# Patient Record
Sex: MALE | Race: BLACK OR AFRICAN AMERICAN | Employment: OTHER | URBAN - METROPOLITAN AREA
[De-identification: names, ages, dates, MRNs, and addresses within clinical notes are randomized per-mention and may not be internally consistent; named-entity substitution may affect disease eponyms.]

---

## 2017-07-03 ENCOUNTER — HOSPITAL ENCOUNTER (OUTPATIENT)
Dept: NUCLEAR MEDICINE | Age: 67
Discharge: HOME | End: 2017-07-03
Attending: INTERNAL MEDICINE
Payer: MEDICARE

## 2017-07-03 DIAGNOSIS — R11.0 NAUSEA: ICD-10-CM

## 2017-07-03 DIAGNOSIS — R63.0 LOSS OF APPETITE: ICD-10-CM

## 2017-07-03 DIAGNOSIS — Z87.19 HISTORY OF GALLSTONES: ICD-10-CM

## 2017-07-03 DIAGNOSIS — R11.10 VOMITING: ICD-10-CM

## 2017-07-03 DIAGNOSIS — R19.7 DIARRHEA: ICD-10-CM

## 2017-07-03 PROCEDURE — 78227 HEPATOBIL SYST IMAGE W/DRUG: CPT

## 2018-02-25 ENCOUNTER — APPOINTMENT (OUTPATIENT)
Dept: CT IMAGING | Age: 68
End: 2018-02-25
Attending: PHYSICIAN ASSISTANT
Payer: MEDICARE

## 2018-02-25 ENCOUNTER — HOSPITAL ENCOUNTER (EMERGENCY)
Age: 68
Discharge: HOME | End: 2018-02-25
Attending: EMERGENCY MEDICINE
Payer: MEDICARE

## 2018-02-25 VITALS
HEART RATE: 70 BPM | DIASTOLIC BLOOD PRESSURE: 82 MMHG | TEMPERATURE: 98.4 F | BODY MASS INDEX: 25.48 KG/M2 | SYSTOLIC BLOOD PRESSURE: 134 MMHG | RESPIRATION RATE: 12 BRPM | OXYGEN SATURATION: 98 % | WEIGHT: 178 LBS | HEIGHT: 70 IN

## 2018-02-25 DIAGNOSIS — N17.9 ACUTE RENAL FAILURE, UNSPECIFIED ACUTE RENAL FAILURE TYPE (HCC): ICD-10-CM

## 2018-02-25 DIAGNOSIS — K52.9 GASTROENTERITIS, ACUTE: Primary | ICD-10-CM

## 2018-02-25 LAB
ALBUMIN SERPL-MCNC: 3 G/DL (ref 3.5–5)
ALBUMIN/GLOB SERPL: 0.9 {RATIO} (ref 1.1–2.2)
ALP SERPL-CCNC: 189 U/L (ref 45–117)
ALT SERPL-CCNC: 11 U/L (ref 12–78)
ANION GAP SERPL CALC-SCNC: 12 MMOL/L (ref 5–15)
AST SERPL-CCNC: 16 U/L (ref 15–37)
BASOPHILS # BLD: 0 K/UL (ref 0–0.1)
BASOPHILS NFR BLD: 0 % (ref 0–1)
BILIRUB SERPL-MCNC: 0.3 MG/DL (ref 0.2–1)
BUN SERPL-MCNC: 47 MG/DL (ref 6–20)
BUN/CREAT SERPL: 10 (ref 12–20)
CALCIUM SERPL-MCNC: 8.3 MG/DL (ref 8.5–10.1)
CHLORIDE SERPL-SCNC: 109 MMOL/L (ref 97–108)
CO2 SERPL-SCNC: 21 MMOL/L (ref 21–32)
CREAT SERPL-MCNC: 4.62 MG/DL (ref 0.7–1.3)
DIFFERENTIAL METHOD BLD: ABNORMAL
EOSINOPHIL # BLD: 0.2 K/UL (ref 0–0.4)
EOSINOPHIL NFR BLD: 3 % (ref 0–7)
ERYTHROCYTE [DISTWIDTH] IN BLOOD BY AUTOMATED COUNT: 14.6 % (ref 11.5–14.5)
GLOBULIN SER CALC-MCNC: 3.4 G/DL (ref 2–4)
GLUCOSE SERPL-MCNC: 105 MG/DL (ref 65–100)
HCT VFR BLD AUTO: 33.8 % (ref 36.6–50.3)
HGB BLD-MCNC: 10.7 G/DL (ref 12.1–17)
IMM GRANULOCYTES # BLD: 0 K/UL (ref 0–0.04)
IMM GRANULOCYTES NFR BLD AUTO: 0 % (ref 0–0.5)
LIPASE SERPL-CCNC: 189 U/L (ref 73–393)
LYMPHOCYTES # BLD: 1 K/UL (ref 0.8–3.5)
LYMPHOCYTES NFR BLD: 22 % (ref 12–49)
MCH RBC QN AUTO: 30.5 PG (ref 26–34)
MCHC RBC AUTO-ENTMCNC: 31.7 G/DL (ref 30–36.5)
MCV RBC AUTO: 96.3 FL (ref 80–99)
MONOCYTES # BLD: 0.6 K/UL (ref 0–1)
MONOCYTES NFR BLD: 12 % (ref 5–13)
NEUTS SEG # BLD: 2.8 K/UL (ref 1.8–8)
NEUTS SEG NFR BLD: 62 % (ref 32–75)
NRBC # BLD: 0 K/UL (ref 0–0.01)
NRBC BLD-RTO: 0 PER 100 WBC
PLATELET # BLD AUTO: 159 K/UL (ref 150–400)
PMV BLD AUTO: 10.8 FL (ref 8.9–12.9)
POTASSIUM SERPL-SCNC: 4.7 MMOL/L (ref 3.5–5.1)
PROT SERPL-MCNC: 6.4 G/DL (ref 6.4–8.2)
RBC # BLD AUTO: 3.51 M/UL (ref 4.1–5.7)
SODIUM SERPL-SCNC: 142 MMOL/L (ref 136–145)
WBC # BLD AUTO: 4.5 K/UL (ref 4.1–11.1)

## 2018-02-25 PROCEDURE — 80053 COMPREHEN METABOLIC PANEL: CPT | Performed by: PHYSICIAN ASSISTANT

## 2018-02-25 PROCEDURE — 85025 COMPLETE CBC W/AUTO DIFF WBC: CPT | Performed by: PHYSICIAN ASSISTANT

## 2018-02-25 PROCEDURE — 74011250636 HC RX REV CODE- 250/636: Performed by: PHYSICIAN ASSISTANT

## 2018-02-25 PROCEDURE — 96374 THER/PROPH/DIAG INJ IV PUSH: CPT

## 2018-02-25 PROCEDURE — 74176 CT ABD & PELVIS W/O CONTRAST: CPT

## 2018-02-25 PROCEDURE — C9113 INJ PANTOPRAZOLE SODIUM, VIA: HCPCS | Performed by: PHYSICIAN ASSISTANT

## 2018-02-25 PROCEDURE — 96375 TX/PRO/DX INJ NEW DRUG ADDON: CPT

## 2018-02-25 PROCEDURE — 96361 HYDRATE IV INFUSION ADD-ON: CPT

## 2018-02-25 PROCEDURE — 99284 EMERGENCY DEPT VISIT MOD MDM: CPT

## 2018-02-25 PROCEDURE — 83690 ASSAY OF LIPASE: CPT | Performed by: PHYSICIAN ASSISTANT

## 2018-02-25 PROCEDURE — 36415 COLL VENOUS BLD VENIPUNCTURE: CPT | Performed by: PHYSICIAN ASSISTANT

## 2018-02-25 PROCEDURE — 74011250637 HC RX REV CODE- 250/637: Performed by: PHYSICIAN ASSISTANT

## 2018-02-25 RX ORDER — MEGESTROL ACETATE 40 MG/1
40 TABLET ORAL 2 TIMES DAILY
COMMUNITY
End: 2022-03-24

## 2018-02-25 RX ORDER — PREDNISONE 2.5 MG/1
2.5 TABLET ORAL DAILY
COMMUNITY
End: 2019-04-30

## 2018-02-25 RX ORDER — TIMOLOL MALEATE 5 MG/ML
1 SOLUTION/ DROPS OPHTHALMIC DAILY
COMMUNITY

## 2018-02-25 RX ORDER — ONDANSETRON 2 MG/ML
4 INJECTION INTRAMUSCULAR; INTRAVENOUS
Status: COMPLETED | OUTPATIENT
Start: 2018-02-25 | End: 2018-02-25

## 2018-02-25 RX ORDER — CALCITRIOL 0.25 UG/1
0.25 CAPSULE ORAL DAILY
COMMUNITY
End: 2019-04-30

## 2018-02-25 RX ORDER — PANTOPRAZOLE SODIUM 40 MG/10ML
40 INJECTION, POWDER, LYOPHILIZED, FOR SOLUTION INTRAVENOUS
Status: COMPLETED | OUTPATIENT
Start: 2018-02-25 | End: 2018-02-25

## 2018-02-25 RX ORDER — TESTOSTERONE CYPIONATE 200 MG/ML
200 INJECTION INTRAMUSCULAR
Status: ON HOLD | COMMUNITY
End: 2021-05-11

## 2018-02-25 RX ORDER — ONDANSETRON 4 MG/1
4 TABLET, ORALLY DISINTEGRATING ORAL
Qty: 12 TAB | Refills: 0 | Status: SHIPPED | OUTPATIENT
Start: 2018-02-25 | End: 2019-04-30

## 2018-02-25 RX ORDER — DICYCLOMINE HYDROCHLORIDE 10 MG/1
10 CAPSULE ORAL
Status: COMPLETED | OUTPATIENT
Start: 2018-02-25 | End: 2018-02-25

## 2018-02-25 RX ORDER — ASPIRIN 81 MG/1
81 TABLET ORAL DAILY
Status: ON HOLD | COMMUNITY
End: 2020-03-14

## 2018-02-25 RX ORDER — ALLOPURINOL 100 MG/1
100 TABLET ORAL DAILY
Status: ON HOLD | COMMUNITY
End: 2021-05-11

## 2018-02-25 RX ORDER — DOXAZOSIN 2 MG/1
2 TABLET ORAL
COMMUNITY
End: 2019-04-30

## 2018-02-25 RX ORDER — DICYCLOMINE HYDROCHLORIDE 20 MG/1
20 TABLET ORAL EVERY 6 HOURS
Qty: 20 TAB | Refills: 0 | Status: SHIPPED | OUTPATIENT
Start: 2018-02-25 | End: 2018-03-02

## 2018-02-25 RX ADMIN — SODIUM CHLORIDE 500 ML: 900 INJECTION, SOLUTION INTRAVENOUS at 17:46

## 2018-02-25 RX ADMIN — ONDANSETRON 4 MG: 2 INJECTION INTRAMUSCULAR; INTRAVENOUS at 17:47

## 2018-02-25 RX ADMIN — SODIUM CHLORIDE 500 ML: 900 INJECTION, SOLUTION INTRAVENOUS at 19:05

## 2018-02-25 RX ADMIN — PANTOPRAZOLE SODIUM 40 MG: 40 INJECTION, POWDER, FOR SOLUTION INTRAVENOUS at 17:47

## 2018-02-25 RX ADMIN — DICYCLOMINE HYDROCHLORIDE 10 MG: 10 CAPSULE ORAL at 19:53

## 2018-02-25 RX ADMIN — ONDANSETRON 4 MG: 2 INJECTION INTRAMUSCULAR; INTRAVENOUS at 18:39

## 2018-02-25 NOTE — ED PROVIDER NOTES
HPI Comments: Sharita Owens is a 74 y.o. male  who presents by private vehicle to ER with c/o Patient presents with:  Abdominal Pain. Patient with nausea, vomiting and diarrhea with epigastric abdominal pain since 2pm today. Patient with history of kidney transplant in 2004. Denies any fever or chills. He specifically denies any fevers, chills, nausea, vomiting, chest pain, shortness of breath, headache, rash, diarrhea, abdominal pain, urinary/bowel changes, sweating or weight loss. PCP: Jessica Parekh MD   PMHx significant for: Past Medical History:  No date: Hypertension  No date: Ill-defined condition      Comment: dialysis access to left arm  No date: Meningitis   PSHx significant for: Past Surgical History:  No date: HX CATARACT REMOVAL  1971,2005: TRANSPLANTATION OF KIDNEY Bilateral      Comment: x2  Social Hx: Tobacco use: Smoking status: Current Every Day Smoker                                                   Packs/day: 0.50      Years: 0.00         Smokeless status: Not on file                     ; EtOH use: The patient states he drinks socially per week.; Illicit Drug use: Allergies:  No Known Allergies    There are no other complaints, changes or physical findings at this time. Patient is a 74 y.o. male presenting with abdominal pain. The history is provided by the patient. Abdominal Pain    This is a new problem. The current episode started 3 to 5 hours ago. The problem occurs constantly. The problem has not changed since onset. The pain is associated with vomiting. The pain is located in the epigastric region. The pain is at a severity of 9/10. The pain is severe. Associated symptoms include diarrhea, nausea and vomiting. Pertinent negatives include no anorexia, no fever, no dysuria, no myalgias, no chest pain and no back pain. Nothing worsens the pain. The pain is relieved by nothing. His past medical history does not include gallstones, GERD, ectopic pregnancy or DM.  The patient's surgical history non-contributory. Past Medical History:   Diagnosis Date    Hypertension     Ill-defined condition     dialysis access to left arm    Meningitis        Past Surgical History:   Procedure Laterality Date    HX CATARACT REMOVAL      TRANSPLANTATION OF KIDNEY Bilateral 1971,2005    x2         No family history on file. Social History     Social History    Marital status:      Spouse name: N/A    Number of children: N/A    Years of education: N/A     Occupational History    Not on file. Social History Main Topics    Smoking status: Current Every Day Smoker     Packs/day: 0.50    Smokeless tobacco: Not on file    Alcohol use Yes      Comment: socially    Drug use: Not on file    Sexual activity: Not on file     Other Topics Concern    Not on file     Social History Narrative         ALLERGIES: Review of patient's allergies indicates no known allergies. Review of Systems   Constitutional: Negative. Negative for fever. HENT: Negative. Eyes: Negative. Respiratory: Negative. Cardiovascular: Negative. Negative for chest pain. Gastrointestinal: Positive for abdominal pain, diarrhea, nausea and vomiting. Negative for anorexia. Endocrine: Negative. Genitourinary: Negative. Negative for dysuria. Musculoskeletal: Negative. Negative for back pain and myalgias. Skin: Negative. Allergic/Immunologic: Negative. Neurological: Negative. Hematological: Negative. Psychiatric/Behavioral: Negative. All other systems reviewed and are negative. Vitals:    02/25/18 1709 02/25/18 1718 02/25/18 1810   BP:  140/85    Pulse: 70     Resp: 12     Temp: 98.4 °F (36.9 °C)     SpO2: 99%  99%   Weight: 80.7 kg (178 lb)     Height: 5' 10\" (1.778 m)              Physical Exam   Constitutional: He is oriented to person, place, and time. He appears well-developed and well-nourished. HENT:   Head: Normocephalic and atraumatic.    Right Ear: External ear normal.   Left Ear: External ear normal.   Mouth/Throat: Oropharynx is clear and moist. No oropharyngeal exudate. Eyes: Conjunctivae and EOM are normal. Pupils are equal, round, and reactive to light. Right eye exhibits no discharge. Left eye exhibits no discharge. No scleral icterus. Neck: Normal range of motion. Neck supple. No tracheal deviation present. No thyromegaly present. Cardiovascular: Normal rate, regular rhythm, normal heart sounds and intact distal pulses. No murmur heard. Pulmonary/Chest: Effort normal and breath sounds normal. No respiratory distress. He has no wheezes. He has no rales. Abdominal: Soft. Bowel sounds are normal. He exhibits no distension. There is tenderness in the epigastric area. There is no rebound and no guarding. Musculoskeletal: Normal range of motion. He exhibits no edema or tenderness. Lymphadenopathy:     He has no cervical adenopathy. Neurological: He is alert and oriented to person, place, and time. No cranial nerve deficit. Coordination normal.   Skin: Skin is warm. No rash noted. No erythema. Psychiatric: He has a normal mood and affect. His behavior is normal. Judgment and thought content normal.   Nursing note and vitals reviewed. MDM  Number of Diagnoses or Management Options  Acute renal failure, unspecified acute renal failure type ([facility] [address]):   Gastroenteritis, acute:   Diagnosis management comments: Assesment/Plan- 74 y.o. Patient presents with:  Abdominal Pain  differential includes: gastroenteritis, small bowel obstruction, colitis. Labs and imaging reviewed with elevated creatinine, ct scan with findings consistent with enteritis. Patient with history of kidney transplant, patient believes last creatinine was 3.6. Discussed with Dr. Kai Cordon Nephrology who was unable to look up previous labs. Patient given IV  Bolus in ED, feeling better. Discharge home. Recommend Nephrology follow up. Patient educated on reasons to return to the ED. Amount and/or Complexity of Data Reviewed  Clinical lab tests: ordered and reviewed  Tests in the radiology section of CPT®: ordered and reviewed  Tests in the medicine section of CPT®: ordered and reviewed  Discuss the patient with other providers: yes (Attending- Dr. Tian Cooley who also saw patient and agrees with plan)          ED Course       Procedures

## 2018-02-26 NOTE — ED NOTES
Pt discharged by provider. Pt ambulatory off the unit with a steady gait with family and in NAD. Pt declined using a w/c. Home with spouse.

## 2018-02-26 NOTE — DISCHARGE INSTRUCTIONS
Gastroenteritis: Care Instructions  Your Care Instructions    Gastroenteritis is an illness that may cause nausea, vomiting, and diarrhea. It is sometimes called \"stomach flu. \" It can be caused by bacteria or a virus. You will probably begin to feel better in 1 to 2 days. In the meantime, get plenty of rest and make sure you do not become dehydrated. Dehydration occurs when your body loses too much fluid. Follow-up care is a key part of your treatment and safety. Be sure to make and go to all appointments, and call your doctor if you are having problems. It's also a good idea to know your test results and keep a list of the medicines you take. How can you care for yourself at home? · If your doctor prescribed antibiotics, take them as directed. Do not stop taking them just because you feel better. You need to take the full course of antibiotics. · Drink plenty of fluids to prevent dehydration, enough so that your urine is light yellow or clear like water. Choose water and other caffeine-free clear liquids until you feel better. If you have kidney, heart, or liver disease and have to limit fluids, talk with your doctor before you increase your fluid intake. · Drink fluids slowly, in frequent, small amounts, because drinking too much too fast can cause vomiting. · Begin eating mild foods, such as dry toast, yogurt, applesauce, bananas, and rice. Avoid spicy, hot, or high-fat foods, and do not drink alcohol or caffeine for a day or two. Do not drink milk or eat ice cream until you are feeling better. How to prevent gastroenteritis  · Keep hot foods hot and cold foods cold. · Do not eat meats, dressings, salads, or other foods that have been kept at room temperature for more than 2 hours. · Use a thermometer to check your refrigerator. It should be between 34°F and 40°F.  · Defrost meats in the refrigerator or microwave, not on the kitchen counter. · Keep your hands and your kitchen clean.  Wash your hands, cutting boards, and countertops with hot soapy water frequently. · Cook meat until it is well done. · Do not eat raw eggs or uncooked sauces made with raw eggs. · Do not take chances. If food looks or tastes spoiled, throw it out. When should you call for help? Call 911 anytime you think you may need emergency care. For example, call if:  ? · You vomit blood or what looks like coffee grounds. ? · You passed out (lost consciousness). ? · You pass maroon or very bloody stools. ?Call your doctor now or seek immediate medical care if:  ? · You have severe belly pain. ? · You have signs of needing more fluids. You have sunken eyes, a dry mouth, and pass only a little dark urine. ? · You feel like you are going to faint. ? · You have increased belly pain that does not go away in 1 to 2 days. ? · You have new or increased nausea, or you are vomiting. ? · You have a new or higher fever. ? · Your stools are black and tarlike or have streaks of blood. ? Watch closely for changes in your health, and be sure to contact your doctor if:  ? · You are dizzy or lightheaded. ? · You urinate less than usual, or your urine is dark yellow or brown. ? · You do not feel better with each day that goes by. Where can you learn more? Go to http://gail-lalo.info/. Enter N142 in the search box to learn more about \"Gastroenteritis: Care Instructions. \"  Current as of: March 3, 2017  Content Version: 11.4  © 8358-7291 [employer]. Care instructions adapted under license by [employer] (which disclaims liability or warranty for this information). If you have questions about a medical condition or this instruction, always ask your healthcare professional. [address] any warranty or liability for your use of this information. We hope that we have addressed all of your medical concerns.  The examination and treatment you received in the Emergency Department were for an emergent problem and were not intended as complete care. It is important that you follow up with your healthcare provider(s) for ongoing care. If your symptoms worsen or do not improve as expected, and you are unable to reach your usual health care provider(s), you should return to the Emergency Department. Today's healthcare is undergoing tremendous change, and patient satisfaction surveys are one of the many tools to assess the quality of medical care. You may receive a survey from the [employer] regarding your experience in the Emergency Department. I hope that your experience has been completely positive, particularly the medical care that I provided. As such, please participate in the survey; anything less than excellent does not meet my expectations or intentions. [facility] and [employer] participate in nationally recognized quality of care measures. If your blood pressure is greater than 120/80, as reported below, we urge that you seek medical care to address the potential of high blood pressure, commonly known as hypertension. Hypertension can be hereditary or can be caused by certain medical conditions, pain, stress, or \"white coat syndrome. \"       Please make an appointment with your health care provider(s) for follow up of your Emergency Department visit. VITALS:   Patient Vitals for the past 8 hrs:   Temp Pulse Resp BP SpO2   02/25/18 1810 - - - - 99 %   02/25/18 1718 - - - 140/85 -   02/25/18 1709 98.4 °F (36.9 °C) 70 12 - 99 %          Thank you for allowing us to provide you with medical care today. We realize that you have many choices for your emergency care needs. Please choose us in the future for any continued health care needs. Russ Chacko, [address]: [phone]            Recent Results (from the past 24 hour(s)) CBC WITH AUTOMATED DIFF    Collection Time: 02/25/18  5:36 PM   Result Value Ref Range    WBC 4.5 4.1 - 11.1 K/uL    RBC 3.51 (L) 4.10 - 5.70 M/uL    HGB 10.7 (L) 12.1 - 17.0 g/dL    HCT 33.8 (L) 36.6 - 50.3 %    MCV 96.3 80.0 - 99.0 FL    MCH 30.5 26.0 - 34.0 PG    MCHC 31.7 30.0 - 36.5 g/dL    RDW 14.6 (H) 11.5 - 14.5 %    PLATELET 075 764 - 311 K/uL    MPV 10.8 8.9 - 12.9 FL    NRBC 0.0 0  WBC    ABSOLUTE NRBC 0.00 0.00 - 0.01 K/uL    NEUTROPHILS 62 32 - 75 %    LYMPHOCYTES 22 12 - 49 %    MONOCYTES 12 5 - 13 %    EOSINOPHILS 3 0 - 7 %    BASOPHILS 0 0 - 1 %    IMMATURE GRANULOCYTES 0 0.0 - 0.5 %    ABS. NEUTROPHILS 2.8 1.8 - 8.0 K/UL    ABS. LYMPHOCYTES 1.0 0.8 - 3.5 K/UL    ABS. MONOCYTES 0.6 0.0 - 1.0 K/UL    ABS. EOSINOPHILS 0.2 0.0 - 0.4 K/UL    ABS. BASOPHILS 0.0 0.0 - 0.1 K/UL    ABS. IMM. GRANS. 0.0 0.00 - 0.04 K/UL    DF AUTOMATED     METABOLIC PANEL, COMPREHENSIVE    Collection Time: 02/25/18  5:36 PM   Result Value Ref Range    Sodium 142 136 - 145 mmol/L    Potassium 4.7 3.5 - 5.1 mmol/L    Chloride 109 (H) 97 - 108 mmol/L    CO2 21 21 - 32 mmol/L    Anion gap 12 5 - 15 mmol/L    Glucose 105 (H) 65 - 100 mg/dL    BUN 47 (H) 6 - 20 MG/DL    Creatinine 4.62 (H) 0.70 - 1.30 MG/DL    BUN/Creatinine ratio 10 (L) 12 - 20      GFR est AA 16 (L) >60 ml/min/1.73m2    GFR est non-AA 13 (L) >60 ml/min/1.73m2    Calcium 8.3 (L) 8.5 - 10.1 MG/DL    Bilirubin, total 0.3 0.2 - 1.0 MG/DL    ALT (SGPT) 11 (L) 12 - 78 U/L    AST (SGOT) 16 15 - 37 U/L    Alk. phosphatase 189 (H) 45 - 117 U/L    Protein, total 6.4 6.4 - 8.2 g/dL    Albumin 3.0 (L) 3.5 - 5.0 g/dL    Globulin 3.4 2.0 - 4.0 g/dL    A-G Ratio 0.9 (L) 1.1 - 2.2     LIPASE    Collection Time: 02/25/18  5:36 PM   Result Value Ref Range    Lipase 189 73 - 393 U/L       Ct Abd Pelv Wo Cont    Result Date: 2/25/2018  EXAM:  CT ABDOMEN PELVIS WITHOUT CONTRAST INDICATION:  abdominal pain COMPARISON: CT of the abdomen and pelvis, 3/1/2015. Ranjeet Toscano  TECHNIQUE: Unenhanced multislice helical CT was performed from the diaphragm to the symphysis pubis without intravenous contrast administration. Contiguous 5 mm axial images were reconstructed and lung and soft tissue windows were generated. Coronal and sagittal reformations were generated. CT dose reduction was achieved through use of a standardized protocol tailored for this examination and automatic exposure control for dose modulation. Raenell Mortimer FINDINGS: INCIDENTALLY IMAGED CHEST: Mediastinum: Small amount of fluid at the hiatus. Mural thickening in the distal esophagus. Heart/vessels: Dense calcifications versus valvuloplasty at the aortic valve. Calcifications in the coronary arteries. Diminished attenuation in the blood suggesting anemia. Lungs/Pleura: Within normal limits. . ABDOMEN: Liver: Within normal limits. Gallbladder/Biliary: Within normal limits. Spleen: Within normal limits. Pancreas: Within normal limits. Adrenals: Within normal limits. Kidneys: The native kidneys are not visualized. 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 Renal atrophy bilaterally. Small, rounded, exophytic, low-attenuation lesions projecting off the right transplant kidney in the pelvis which are not significantly changed. Peritoneum/Mesenteries: Within normal limits. Extraperitoneum: Within normal limits. Gastrointestinal tract: There is a minimal amount of contrast within the distal small bowel and in the large bowel. There is a featureless and distended loop of distal small bowel with adjacent stranding. Vascular: Within normal limits. Raenell Mortimer PELVIS: Extraperitoneum: Within normal limits. Ureters: Within normal limits. Bladder: Within normal limits. Reproductive System: Calcifications in the prostate. . MSK: Degenerative changes in the spine. .    IMPRESSION: 1. Distended and relatively featureless loop of distal small bowel in the lower abdomen with adjacent stranding. Findings are nonspecific and suggests an infectious/inflammatory process.  2. Incidental findings as above. We hope that we have addressed all of your medical concerns. The examination and treatment you received in the Emergency Department were for an emergent problem and were not intended as complete care. It is important that you follow up with your healthcare provider(s) for ongoing care. If your symptoms worsen or do not improve as expected, and you are unable to reach your usual health care provider(s), you should return to the Emergency Department. Today's healthcare is undergoing tremendous change, and patient satisfaction surveys are one of the many tools to assess the quality of medical care. You may receive a survey from the [employer] regarding your experience in the Emergency Department. I hope that your experience has been completely positive, particularly the medical care that I provided. As such, please participate in the survey; anything less than excellent does not meet my expectations or intentions. [address] [place] [address] and [employer] participate in nationally recognized quality of care measures. If your blood pressure is greater than 120/80, as reported below, we urge that you seek medical care to address the potential of high blood pressure, commonly known as hypertension. Hypertension can be hereditary or can be caused by certain medical conditions, pain, stress, or \"white coat syndrome. \"       Please make an appointment with your health care provider(s) for follow up of your Emergency Department visit. VITALS:   Patient Vitals for the past 8 hrs:   Temp Pulse Resp BP SpO2   02/25/18 1810 - - - - 99 %   02/25/18 1718 - - - 140/85 -   02/25/18 1709 98.4 °F (36.9 °C) 70 12 - 99 %          Thank you for allowing us to provide you with medical care today. We realize that you have many choices for your emergency care needs. Please choose us in the future for any continued health care needs. Harvinder Chacko, [address] [place] [address].   Office: [phone]            Recent Results (from the past 24 hour(s))   CBC WITH AUTOMATED DIFF    Collection Time: 02/25/18  5:36 PM   Result Value Ref Range    WBC 4.5 4.1 - 11.1 K/uL    RBC 3.51 (L) 4.10 - 5.70 M/uL    HGB 10.7 (L) 12.1 - 17.0 g/dL    HCT 33.8 (L) 36.6 - 50.3 %    MCV 96.3 80.0 - 99.0 FL    MCH 30.5 26.0 - 34.0 PG    MCHC 31.7 30.0 - 36.5 g/dL    RDW 14.6 (H) 11.5 - 14.5 %    PLATELET 607 731 - 394 K/uL    MPV 10.8 8.9 - 12.9 FL    NRBC 0.0 0  WBC    ABSOLUTE NRBC 0.00 0.00 - 0.01 K/uL    NEUTROPHILS 62 32 - 75 %    LYMPHOCYTES 22 12 - 49 %    MONOCYTES 12 5 - 13 %    EOSINOPHILS 3 0 - 7 %    BASOPHILS 0 0 - 1 %    IMMATURE GRANULOCYTES 0 0.0 - 0.5 %    ABS. NEUTROPHILS 2.8 1.8 - 8.0 K/UL    ABS. LYMPHOCYTES 1.0 0.8 - 3.5 K/UL    ABS. MONOCYTES 0.6 0.0 - 1.0 K/UL    ABS. EOSINOPHILS 0.2 0.0 - 0.4 K/UL    ABS. BASOPHILS 0.0 0.0 - 0.1 K/UL    ABS. IMM. GRANS. 0.0 0.00 - 0.04 K/UL    DF AUTOMATED     METABOLIC PANEL, COMPREHENSIVE    Collection Time: 02/25/18  5:36 PM   Result Value Ref Range    Sodium 142 136 - 145 mmol/L    Potassium 4.7 3.5 - 5.1 mmol/L    Chloride 109 (H) 97 - 108 mmol/L    CO2 21 21 - 32 mmol/L    Anion gap 12 5 - 15 mmol/L    Glucose 105 (H) 65 - 100 mg/dL    BUN 47 (H) 6 - 20 MG/DL    Creatinine 4.62 (H) 0.70 - 1.30 MG/DL    BUN/Creatinine ratio 10 (L) 12 - 20      GFR est AA 16 (L) >60 ml/min/1.73m2    GFR est non-AA 13 (L) >60 ml/min/1.73m2    Calcium 8.3 (L) 8.5 - 10.1 MG/DL    Bilirubin, total 0.3 0.2 - 1.0 MG/DL    ALT (SGPT) 11 (L) 12 - 78 U/L    AST (SGOT) 16 15 - 37 U/L    Alk.  phosphatase 189 (H) 45 - 117 U/L    Protein, total 6.4 6.4 - 8.2 g/dL    Albumin 3.0 (L) 3.5 - 5.0 g/dL    Globulin 3.4 2.0 - 4.0 g/dL    A-G Ratio 0.9 (L) 1.1 - 2.2     LIPASE    Collection Time: 02/25/18  5:36 PM   Result Value Ref Range    Lipase 189 73 - 393 U/L       Ct Abd Pelv Wo Cont    Result Date: 2/25/2018  EXAM:  CT ABDOMEN PELVIS WITHOUT CONTRAST INDICATION:  abdominal pain COMPARISON: CT of the abdomen and pelvis, 3/1/2015. Israeli Pillar TECHNIQUE: Unenhanced multislice helical CT was performed from the diaphragm to the symphysis pubis without intravenous contrast administration. Contiguous 5 mm axial images were reconstructed and lung and soft tissue windows were generated. Coronal and sagittal reformations were generated. CT dose reduction was achieved through use of a standardized protocol tailored for this examination and automatic exposure control for dose modulation. Israeli Pillar FINDINGS: INCIDENTALLY IMAGED CHEST: Mediastinum: Small amount of fluid at the hiatus. Mural thickening in the distal esophagus. Heart/vessels: Dense calcifications versus valvuloplasty at the aortic valve. Calcifications in the coronary arteries. Diminished attenuation in the blood suggesting anemia. Lungs/Pleura: Within normal limits. . ABDOMEN: Liver: Within normal limits. Gallbladder/Biliary: Within normal limits. Spleen: Within normal limits. Pancreas: Within normal limits. Adrenals: Within normal limits. Kidneys: The native kidneys are not visualized. 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 Renal atrophy bilaterally. Small, rounded, exophytic, low-attenuation lesions projecting off the right transplant kidney in the pelvis which are not significantly changed. Peritoneum/Mesenteries: Within normal limits. Extraperitoneum: Within normal limits. Gastrointestinal tract: There is a minimal amount of contrast within the distal small bowel and in the large bowel. There is a featureless and distended loop of distal small bowel with adjacent stranding. Vascular: Within normal limits. Israeli Pillar PELVIS: Extraperitoneum: Within normal limits. Ureters: Within normal limits. Bladder: Within normal limits. Reproductive System: Calcifications in the prostate. . MSK: Degenerative changes in the spine. .    IMPRESSION: 1.  Distended and relatively featureless loop of distal small bowel in the lower abdomen with adjacent stranding. Findings are nonspecific and suggests an infectious/inflammatory process. 2. Incidental findings as above.

## 2018-02-26 NOTE — PROGRESS NOTES
[facility]: MED RECONCILIATION    Information obtained from: Information obtained from: Patient, his medication bottles, RX Query    Allergies: Review of patient's allergies indicates no known allergies. Prior to Admission Medications:     Medication Documentation Review Audit       Reviewed by ANDREEA MendozaD (Pharmacist) on 02/25/18 at [address]         Medication Sig Documenting Provider Last Dose Status Taking?      allopurinol (ZYLOPRIM) 100 mg tablet Take 100 mg by mouth daily. Babak Genao MD 2/25/2018 Active Yes    aspirin delayed-release 81 mg tablet Take 81 mg by mouth daily. Historical Provider 2/25/2018 Active Yes    calcitRIOL (ROCALTROL) 0.25 mcg capsule Take 0.25 mcg by mouth daily. Babak Genao MD 2/25/2018 Active Yes    diltiazem CD (CARDIZEM CD) 240 mg ER capsule Take 240 mg by mouth two (2) times a day. Babak Genao MD 2/25/2018 AM Active Yes             Med Note (Wilma Peterson Feb 25, 2018  7:19 PM):        doxazosin (CARDURA) 2 mg tablet Take 2 mg by mouth nightly. Babak Genao MD 2/24/2018 Active Yes    fluticasone (FLONASE) 50 mcg/actuation nasal spray 2 Sprays by Both Nostrils route daily as needed for Rhinitis or Allergies. Babak Genao MD  Active Yes    furosemide (LASIX) 40 mg tablet Take 40 mg by mouth daily. Babak Genao MD 2/25/2018 Active Yes    megestrol (MEGACE) 40 mg tablet Take 40 mg by mouth two (2) times a day. Babak Genao MD 2/25/2018 AM Active Yes    mycophenolate (CELLCEPT) 250 mg capsule Take 250 mg by mouth two (2) times a day. Babak Genao MD 2/25/2018 AM Active Yes                 pantoprazole (PROTONIX) 40 mg tablet Take 40 mg by mouth daily. Babak Genao MD 2/25/2018 Active Yes    predniSONE (DELTASONE) 2.5 mg tablet Take 2.5 mg by mouth daily. Historical Provider 2/25/2018 Active Yes    tacrolimus (PROGRAF) 1 mg capsule Take 2 mg by mouth every twelve (12) hours.  Babak Genao MD 2/25/2018 AM Active Yes    testosterone cypionate (DEPOTESTOTERONE CYPIONATE) 200 mg/mL injection 200 mg by IntraMUSCular route every fourteen (14) days. Historical Provider 2/23/2018 Active Yes    timolol (TIMOPTIC) 0.5 % ophthalmic solution Administer 1 Drop to left eye daily.  Historical Provider 2/25/2018 Active Yes                  Lili Ware, PHARMD   Contact: [phone]

## 2018-06-21 ENCOUNTER — OFFICE VISIT (OUTPATIENT)
Dept: FAMILY MEDICINE CLINIC | Age: 68
End: 2018-06-21

## 2018-06-21 VITALS
DIASTOLIC BLOOD PRESSURE: 74 MMHG | OXYGEN SATURATION: 98 % | HEIGHT: 70 IN | TEMPERATURE: 98.7 F | SYSTOLIC BLOOD PRESSURE: 141 MMHG | BODY MASS INDEX: 25.91 KG/M2 | RESPIRATION RATE: 18 BRPM | HEART RATE: 81 BPM | WEIGHT: 181 LBS

## 2018-06-21 DIAGNOSIS — Z94.0 RENAL TRANSPLANT RECIPIENT: ICD-10-CM

## 2018-06-21 DIAGNOSIS — Z76.89 ESTABLISHING CARE WITH NEW DOCTOR, ENCOUNTER FOR: ICD-10-CM

## 2018-06-21 DIAGNOSIS — K21.9 CHRONIC GERD: ICD-10-CM

## 2018-06-21 DIAGNOSIS — I10 ESSENTIAL HYPERTENSION: Primary | ICD-10-CM

## 2018-06-21 DIAGNOSIS — Z90.79 S/P TURP (STATUS POST TRANSURETHRAL RESECTION OF PROSTATE): ICD-10-CM

## 2018-06-21 DIAGNOSIS — N52.33 ERECTILE DYSFUNCTION FOLLOWING URETHRAL SURGERY: ICD-10-CM

## 2018-06-21 DIAGNOSIS — M1A.39X0 CHRONIC GOUT DUE TO RENAL IMPAIRMENT OF MULTIPLE SITES WITHOUT TOPHUS: ICD-10-CM

## 2018-06-21 PROBLEM — N40.0 BENIGN PROSTATIC HYPERPLASIA WITHOUT LOWER URINARY TRACT SYMPTOMS: Status: ACTIVE | Noted: 2018-06-21

## 2018-06-21 PROBLEM — N52.9 ERECTILE DYSFUNCTION: Status: ACTIVE | Noted: 2018-06-21

## 2018-06-21 PROBLEM — H54.62 VISION LOSS OF LEFT EYE: Status: ACTIVE | Noted: 2018-06-21

## 2018-06-21 PROBLEM — R60.0 BILATERAL LOWER EXTREMITY EDEMA: Status: ACTIVE | Noted: 2018-06-21

## 2018-06-21 RX ORDER — SILDENAFIL 100 MG/1
100 TABLET, FILM COATED ORAL AS NEEDED
COMMUNITY
End: 2018-07-05 | Stop reason: SDUPTHER

## 2018-06-21 NOTE — MR AVS SNAPSHOT
[address] 
[phone] Patient: Pilar Patrick MRN: [id number] EUD:[birth year] Visit Information Date & Time Provider Department Dept. Phone Encounter #  
 6/21/2018  9:45 AM Scottie Sotomayor MD 0975 [facility] [phone] [id number] Follow-up Instructions Return in about 1 month (around 7/21/2018). Upcoming Health Maintenance Date Due Hepatitis C Screening 1950 DTaP/Tdap/Td series (1 - Tdap) 7/13/1973 FOBT Q 1 YEAR AGE 50-75 7/13/2002 ZOSTER VACCINE AGE 60> 5/13/2012 GLAUCOMA SCREENING Q2Y 7/13/2017 Pneumococcal 65+ High/Highest Risk (1 of 2 - PCV13) 7/13/2017 MEDICARE YEARLY EXAM 3/14/2018 Influenza Age 5 to Adult 8/1/2018 Allergies as of 6/21/2018  Review Complete On: 6/21/2018 By: Martha Wang LPN No Known Allergies Current Immunizations  Never Reviewed No immunizations on file. Not reviewed this visit You Were Diagnosed With   
  
 Codes Comments Establishing care with new doctor, encounter for    -  Primary ICD-10-CM: Z76.89 
ICD-9-CM: V65.8 Essential hypertension     ICD-10-CM: I10 
ICD-9-CM: 401.9 Vitals BP Pulse Temp Resp Height(growth percentile) Weight(growth percentile) 141/74 81 98.7 °F (37.1 °C) (Oral) 18 5' 10\" (1.778 m) 181 lb (82.1 kg) SpO2 BMI Smoking Status 98% 25.97 kg/m2 Current Every Day Smoker BMI and BSA Data Body Mass Index Body Surface Area  
 25.97 kg/m 2 2.01 m 2 Preferred Pharmacy Pharmacy Name Phone [facility] [address]  [facility] [phone] Your Updated Medication List  
  
   
This list is accurate as of 6/21/18 11:12 AM.  Always use your most recent med list.  
  
  
  
  
 allopurinol 100 mg tablet Commonly known as:  Aric Carrion  
 Take 100 mg by mouth daily. aspirin delayed-release 81 mg tablet Take 81 mg by mouth daily. calcitRIOL 0.25 mcg capsule Commonly known as:  ROCALTROL Take 0.25 mcg by mouth daily. dilTIAZem  mg ER capsule Commonly known as:  CARDIZEM CD Take 240 mg by mouth two (2) times a day. doxazosin 2 mg tablet Commonly known as:  CARDURA Take 2 mg by mouth nightly. FLONASE 50 mcg/actuation nasal spray Generic drug:  fluticasone 2 Sprays by Both Nostrils route daily as needed for Rhinitis or Allergies. LASIX 40 mg tablet Generic drug:  furosemide Take 40 mg by mouth daily. megestrol 40 mg tablet Commonly known as:  MEGACE Take 40 mg by mouth daily. mycophenolate mofetil 250 mg capsule Commonly known as:  CELLCEPT Take 250 mg by mouth two (2) times a day. ondansetron 4 mg disintegrating tablet Commonly known as:  ZOFRAN ODT Take 1 Tab by mouth every eight (8) hours as needed for Nausea. predniSONE 2.5 mg tablet Commonly known as:  Jenny Marina Take 2.5 mg by mouth daily. PROGRAF 1 mg capsule Generic drug:  tacrolimus Take 2 mg by mouth every twelve (12) hours. PROTONIX 40 mg tablet Generic drug:  pantoprazole Take 40 mg by mouth daily. testosterone cypionate 200 mg/mL injection Commonly known as:  DEPOTESTOTERONE CYPIONATE  
200 mg by IntraMUSCular route every fourteen (14) days. timolol 0.5 % ophthalmic solution Commonly known as:  TIMOPTIC Administer 1 Drop to left eye daily. VIAGRA 100 mg tablet Generic drug:  sildenafil citrate Take 100 mg by mouth as needed. We Performed the Following AMB POC EKG ROUTINE W/ 12 LEADS, INTER & REP [93109 CPT(R)] Follow-up Instructions Return in about 1 month (around 7/21/2018). To-Do List   
 06/21/2018 ECHO:  2D ECHO LIMTED ADULT W OR WO CONTR Introducing [facility]! Kamryn Marte introduces Ident[employer] patient portal. Now you can access parts of your medical record, email your doctor's office, and request medication refills online. 1. In your internet browser, go to [email or web address] 2. Click on the First Time User? Click Here link in the Sign In box. You will see the New Member Sign Up page. 3. Enter your Ident[employer] Access Code exactly as it appears below. You will not need to use this code after youve completed the sign-up process. If you do not sign up before the expiration date, you must request a new code. · Ident[employer] Access Code: [email or web address] Expires: 9/19/2018 11:12 AM 
 
4. Enter the last four digits of your Social Security Number (xxxx) and Date of Birth (mm/dd/yyyy) as indicated and click Submit. You will be taken to the next sign-up page. 5. Create a IdentiGEN ID. This will be your IdentiGEN login ID and cannot be changed, so think of one that is secure and easy to remember. 6. Create a [employer] password. You can change your password at any time. 7. Enter your Password Reset Question and Answer. This can be used at a later time if you forget your password. 8. Enter your e-mail address. You will receive e-mail notification when new information is available in [address] 9. Click Sign Up. You can now view and download portions of your medical record. 10. Click the Download Summary menu link to download a portable copy of your medical information. If you have questions, please visit the Frequently Asked Questions section of the [employer] website. Remember, [employer] is NOT to be used for urgent needs. For medical emergencies, dial 911. Now available from your iPhone and Android! Please provide this summary of care documentation to your next provider. Your primary care clinician is listed as Gary Nunez. If you have any questions after today's visit, please call [phone].

## 2018-06-21 NOTE — PROGRESS NOTES
[address] [place] [address]    Subjective:     CC: Establish Care with New PCP  History provided by patient an    HPI:  74 yr old male with ongoing HTN, S/P Renal transplant, BPH s/p TURP, GERD, GOUT and ED who presents to clinic today to establish care with a new PCP. Last PCP was 8 yrs ago. Current health maintenance has been by Nephrologist Dr. Alexandria Townsend. Pt has no acute complaints or concerns at this time. States he is generally UTD on his health maintenance and sees nephrologist every 6 months. Past Medical History:   Diagnosis Date    BPH (benign prostatic hyperplasia)     Chronic GERD     Gout     Hypertension     Ill-defined condition     dialysis access to left arm    Meningitis     Renal transplant recipient     S/P TURP 2015    Tobacco use disorder      No Known Allergies  Current Outpatient Prescriptions on File Prior to Visit   Medication Sig Dispense Refill    doxazosin (CARDURA) 2 mg tablet Take 2 mg by mouth nightly.  calcitRIOL (ROCALTROL) 0.25 mcg capsule Take 0.25 mcg by mouth daily.  allopurinol (ZYLOPRIM) 100 mg tablet Take 100 mg by mouth daily.  megestrol (MEGACE) 40 mg tablet Take 40 mg by mouth daily.  aspirin delayed-release 81 mg tablet Take 81 mg by mouth daily.  predniSONE (DELTASONE) 2.5 mg tablet Take 2.5 mg by mouth daily.  timolol (TIMOPTIC) 0.5 % ophthalmic solution Administer 1 Drop to left eye daily.  diltiazem CD (CARDIZEM CD) 240 mg ER capsule Take 240 mg by mouth two (2) times a day. 3    mycophenolate (CELLCEPT) 250 mg capsule Take 250 mg by mouth two (2) times a day.  furosemide (LASIX) 40 mg tablet Take 40 mg by mouth daily.  pantoprazole (PROTONIX) 40 mg tablet Take 40 mg by mouth daily.  tacrolimus (PROGRAF) 1 mg capsule Take 2 mg by mouth every twelve (12) hours.  testosterone cypionate (DEPOTESTOTERONE CYPIONATE) 200 mg/mL injection 200 mg by IntraMUSCular route every fourteen (14) days.       ondansetron (ZOFRAN ODT) 4 mg disintegrating tablet Take 1 Tab by mouth every eight (8) hours as needed for Nausea. 12 Tab 0    fluticasone (FLONASE) 50 mcg/actuation nasal spray 2 Sprays by Both Nostrils route daily as needed for Rhinitis or Allergies. No current facility-administered medications on file prior to visit. Family History   Problem Relation Age of Onset    Heart Disease Mother     Lung Disease Father      Social History     Social History    Marital status: UNKNOWN     Spouse name: N/A    Number of children: 3    Years of education: N/A     Occupational History    Retired       Social History Main Topics    Smoking status: Current Every Day Smoker     Packs/day: 0.50     Types: Cigarettes    Smokeless tobacco: None    Alcohol use Yes      Comment: socially    Drug use: No    Sexual activity: Yes     Other Topics Concern    None     Social History Narrative     Past Surgical History:   Procedure Laterality Date    HX CATARACT REMOVAL      HX TURP  2015    TRANSPLANTATION OF KIDNEY Bilateral [id number]    x2       Patient Active Problem List   Diagnosis Code    Renal transplant recipient Z94.0    Bilateral lower extremity edema R60.0    Erectile dysfunction N52.9    Chronic GERD K21.9    S/P TURP (status post transurethral resection of prostate) Z90.79    Benign prostatic hyperplasia without lower urinary tract symptoms N40.0    Vision loss of left eye H54.62    Chronic gout of multiple sites due to renal impairment M1A. 39X0           Review of Systems   Constitutional:        Intermittent Fatigue   HENT: Negative for congestion, sinus pain and sore throat. Eyes: Negative for photophobia, pain, discharge and redness. Partial Vision Loss in Left Eye   Respiratory: Negative for cough, hemoptysis, sputum production, shortness of breath and wheezing. Cardiovascular: Positive for leg swelling.  Negative for chest pain, palpitations, orthopnea, claudication and PND. Gastrointestinal: Positive for heartburn. Negative for abdominal pain, blood in stool, constipation, diarrhea, melena, nausea and vomiting. Genitourinary: Negative for dysuria, hematuria and urgency. Musculoskeletal: Positive for myalgias. Negative for joint pain. Skin: Negative for itching and rash. Neurological: Negative for sensory change, focal weakness and headaches. Psychiatric/Behavioral: Negative for depression and substance abuse. Objective:     Visit Vitals    /74    Pulse 81    Temp 98.7 °F (37.1 °C) (Oral)    Resp 18    Ht 5' 10\" (1.778 m)    Wt 181 lb (82.1 kg)    SpO2 98%    BMI 25.97 kg/m2        Physical Exam   Constitutional: He is oriented to person, place, and time. AOX3, NAD     HENT:   Head: Normocephalic and atraumatic. Right Ear: External ear normal.   Left Ear: External ear normal.   Nose: Nose normal.   Mouth/Throat: Oropharynx is clear and moist. No oropharyngeal exudate. Eyes: Conjunctivae and EOM are normal. Pupils are equal, round, and reactive to light. No scleral icterus. Bilateral Ctaracts   Neck: Normal range of motion. Neck supple. No JVD present. No tracheal deviation present. No thyromegaly present. Cardiovascular: Normal rate, regular rhythm and intact distal pulses. Murmur heard. 4/6 Pansystolic Murmur   Pulmonary/Chest: Effort normal and breath sounds normal. No respiratory distress. He has no wheezes. He has no rales. He exhibits no tenderness. Abdominal: Soft. Bowel sounds are normal. He exhibits no distension. There is no tenderness. Mild presacral Edema   Musculoskeletal: Normal range of motion. He exhibits edema. He exhibits no tenderness. 2+ Pitting Edema   Lymphadenopathy:     He has no cervical adenopathy. Neurological: He is alert and oriented to person, place, and time. He has normal reflexes. No cranial nerve deficit. He exhibits normal muscle tone.  Coordination normal.   Skin: Skin is warm and dry. No rash noted. No erythema. No pallor. Diffuse Senile Purpura   Psychiatric: He has a normal mood and affect. Pertinent Labs/Studies:  Electrocardiogram (18): RBBB and Non-specific T-wave inversions. Findings Likely chronic      Assessment and orders:       1) Encounter to [facility]36 Maintenance reviewed: Will await PCP/Nephro Records      - Hep C Screening: Likely UTD Given Transplant Hx. Will await records                 - AAA Screenin p/yr smoking hx. CT ABD 2018 with Normal Abdominal Vasculature      - DTaP/Tdap : UTD      - Colon Cancer Screening: Reported normal colonoscopy 3-5 yrs ago. Will await records      - Medicare yearly Exam and Glaucoma screening: Will schedule at upcoming visit      - Immunization: Pt unsure of immunization hx. Will await records and consider pneum and shingles                                Vaccine. Currently on chronic immunosuppressive therapy w/ Tacrolimus, mycophenolate and prednisone    2) Essential Hypertension   - Current regimen includes [id number] BID and Lasix 40mg qd. Daily ASA   - BP Goal of < 140/80. Coordinate BP regimen closely with Nephro   - Baseline EKG with RBBB and non-specific T wave changes   - Last ECHO 10 yrs ago. Will repeat ECHO and refer to  Cardiology     3) 4/6 Aortic Stenosis Murmur   - Management as in #2 above    4) S/P Renal Transplantation w/ Chronic LE Edema   - Currently followed by Dr. Ermelinda Aguilar q6 months   - Continue Immunosuppression with Tacrolimus, Mycophenolate and Prednisione   - Daily Calcitriol    5) BPH S/P TURP   - Follows with Dr. Naomi Garcia (Urology) q6 mos   - Continue Doxazosin 2mg daily    6) Gout: Daily allopurinol    7) Erectile Dysfunction w/ Decreased Libido: Viagra 100mg and Bimonthly Testosterone shots (Dr. Naomi Garcia)    8) GERD: Pantoprazole 40mg daily     I have reviewed patient medical and social history and medications. I have reviewed pertinent labs results and other data.  I have discussed the diagnosis with the patient and the intended plan as seen in the above orders. The patient has received an after-visit summary and questions were answered concerning future plans. I have discussed medication side effects and warnings with the patient as well.     Tayla Church MD  Resident [address] [facility]  06/21/18    Patient discussed and seen with Dr. Lazarus Kyle, Attending Physician

## 2018-06-22 NOTE — PROGRESS NOTES
I reviewed with the resident the medical history and the resident's findings on the physical examination. I discussed with the resident the patient's diagnosis and concur with the plan.     [facility]   Agree with echo and cardiology referral  Remaining plan as outlined in Dr. Cherise Tran note

## 2018-07-05 ENCOUNTER — TELEPHONE (OUTPATIENT)
Dept: FAMILY MEDICINE CLINIC | Age: 68
End: 2018-07-05

## 2018-07-05 ENCOUNTER — OFFICE VISIT (OUTPATIENT)
Dept: FAMILY MEDICINE CLINIC | Age: 68
End: 2018-07-05

## 2018-07-05 VITALS
OXYGEN SATURATION: 99 % | BODY MASS INDEX: 25.62 KG/M2 | HEIGHT: 70 IN | DIASTOLIC BLOOD PRESSURE: 79 MMHG | SYSTOLIC BLOOD PRESSURE: 134 MMHG | HEART RATE: 77 BPM | TEMPERATURE: 97 F | WEIGHT: 179 LBS | RESPIRATION RATE: 16 BRPM

## 2018-07-05 DIAGNOSIS — N52.33 ERECTILE DYSFUNCTION FOLLOWING URETHRAL SURGERY: ICD-10-CM

## 2018-07-05 DIAGNOSIS — S20.212A RIB CONTUSION, LEFT, INITIAL ENCOUNTER: Primary | ICD-10-CM

## 2018-07-05 RX ORDER — SILDENAFIL 100 MG/1
100 TABLET, FILM COATED ORAL AS NEEDED
Qty: 10 TAB | Refills: 0 | Status: ON HOLD | OUTPATIENT
Start: 2018-07-05 | End: 2021-05-11

## 2018-07-05 NOTE — MR AVS SNAPSHOT
[address] [phone] Patient: Collin Montalvo MRN: [id number] Visit Information Date & Time Provider Department Dept. Phone Encounter #  
 7/5/2018  2:00 PM Malena Brown, [facility]5 [facility] [phone] [id number] Follow-up Instructions Return if symptoms worsen or fail to improve. Upcoming Health Maintenance Date Due Hepatitis C Screening 1950 DTaP/Tdap/Td series (1 - Tdap) 7/13/1973 FOBT Q 1 YEAR AGE 50-75 7/13/2002 ZOSTER VACCINE AGE 60> 5/13/2012 GLAUCOMA SCREENING Q2Y 7/13/2017 Pneumococcal 65+ High/Highest Risk (1 of 2 - PCV13) 7/13/2017 MEDICARE YEARLY EXAM 3/14/2018 Influenza Age 5 to Adult 8/1/2018 Allergies as of 7/5/2018  Review Complete On: 7/5/2018 By: Onesimo Luna LPN No Known Allergies Current Immunizations  Never Reviewed No immunizations on file. Not reviewed this visit You Were Diagnosed With   
  
 Codes Comments Erectile dysfunction following urethral surgery    -  Primary ICD-10-CM: N52.33 
ICD-9-CM: 607.84 Rib contusion, left, initial encounter     ICD-10-CM: T24.958Q ICD-9-CM: 922.1 Vitals BP Pulse Temp Resp Height(growth percentile) Weight(growth percentile) 134/79 77 97 °F (36.1 °C) (Oral) 16 5' 10\" (1.778 m) 179 lb (81.2 kg) SpO2 BMI Smoking Status 99% 25.68 kg/m2 Current Every Day Smoker Vitals History BMI and BSA Data Body Mass Index Body Surface Area  
 25.68 kg/m 2 2 m 2 Preferred Pharmacy Pharmacy Name Phone [facility] [address]  [facility] [phone] Your Updated Medication List  
  
   
This list is accurate as of 7/5/18  2:46 PM.  Always use your most recent med list.  
  
  
  
  
 allopurinol 100 mg tablet Commonly known as:  Isaiah Night Take 100 mg by mouth daily. aspirin delayed-release 81 mg tablet Take 81 mg by mouth daily. calcitRIOL 0.25 mcg capsule Commonly known as:  ROCALTROL Take 0.25 mcg by mouth daily. dilTIAZem  mg ER capsule Commonly known as:  CARDIZEM CD Take 240 mg by mouth two (2) times a day. doxazosin 2 mg tablet Commonly known as:  CARDURA Take 2 mg by mouth nightly. FLONASE 50 mcg/actuation nasal spray Generic drug:  fluticasone 2 Sprays by Both Nostrils route daily as needed for Rhinitis or Allergies. LASIX 40 mg tablet Generic drug:  furosemide Take 40 mg by mouth daily. megestrol 40 mg tablet Commonly known as:  MEGACE Take 40 mg by mouth daily. mycophenolate mofetil 250 mg capsule Commonly known as:  CELLCEPT Take 250 mg by mouth two (2) times a day. ondansetron 4 mg disintegrating tablet Commonly known as:  ZOFRAN ODT Take 1 Tab by mouth every eight (8) hours as needed for Nausea. predniSONE 2.5 mg tablet Commonly known as:  Therman Rail Take 2.5 mg by mouth daily. PROGRAF 1 mg capsule Generic drug:  tacrolimus Take 2 mg by mouth every twelve (12) hours. PROTONIX 40 mg tablet Generic drug:  pantoprazole Take 40 mg by mouth daily. sildenafil citrate 100 mg tablet Commonly known as:  VIAGRA Take 1 Tab by mouth as needed. testosterone cypionate 200 mg/mL injection Commonly known as:  DEPOTESTOTERONE CYPIONATE  
200 mg by IntraMUSCular route every fourteen (14) days. timolol 0.5 % ophthalmic solution Commonly known as:  TIMOPTIC Administer 1 Drop to left eye daily. Prescriptions Sent to Pharmacy Refills  
 sildenafil citrate (VIAGRA) 100 mg tablet 0 Sig: Take 1 Tab by mouth as needed.   
 Class: Normal  
 Pharmacy: [facility] [address], [address] [facility] [address] AT NEC of [address] [facility] #: [phone] Route: Oral  
  
Follow-up Instructions Return if symptoms worsen or fail to improve. Patient Instructions Chest Contusion: Care Instructions Your Care Instructions A chest contusion, or bruise, is caused by a fall or direct blow to the chest. Car crashes, falls, getting punched, and injury from bicycle handlebars are common causes of chest contusions. A very forceful blow to the chest can injure the heart or blood vessels in the chest, the lungs, the airway, the liver, or the spleen. Pain may be caused by an injury to muscles, cartilage, or ribs. Deep breathing, coughing, or sneezing can increase your pain. Lying on the injured area also can cause pain. Follow-up care is a key part of your treatment and safety. Be sure to make and go to all appointments, and call your doctor if you are having problems. It's also a good idea to know your test results and keep a list of the medicines you take. How can you care for yourself at home? · Rest and protect the injured or sore area. Stop, change, or take a break from any activity that may be causing your pain. · Put ice or a cold pack on the area for 10 to 20 minutes at a time. Put a thin cloth between the ice and your skin. · After 2 or 3 days, if your swelling is gone, apply a heating pad set on low or a warm cloth to your chest. Some doctors suggest that you go back and forth between hot and cold. Put a thin cloth between the heating pad and your skin. · Do not wrap or tape your ribs for support. This may cause you to take smaller breaths, which could increase your risk of pneumonia and lung collapse. · Ask your doctor if you can take an over-the-counter pain medicine, such as acetaminophen (Tylenol), ibuprofen (Advil, Motrin), or naproxen (Aleve). Be safe with medicines. Read and follow all instructions on the label. · Take your medicines exactly as prescribed. Call your doctor if you think you are having a problem with your medicine. · Gentle stretching and massage may help you feel better after a few days of rest. Stretch slowly to the point just before discomfort begins, then hold the stretch for at least 15 to 30 seconds. Do this 3 or 4 times per day. · As your pain gets better, slowly return to your normal activities. Be patient, because chest bruises can take weeks or months to heal. Any increased pain may be a sign that you need to rest a while longer. When should you call for help? Call 911 anytime you think you may need emergency care. For example, call if: 
? · You have severe trouble breathing. ? · You cough up blood. ?Call your doctor now or seek immediate medical care if: 
? · You have belly pain. ? · You are dizzy or lightheaded, or you feel like you may faint. ? · You develop new symptoms with the chest pain. ? · Your chest pain gets worse. ? · You have a fever. ? · You have some shortness of breath. ? · You have a cough that brings up mucus from the lungs. ? Watch closely for changes in your health, and be sure to contact your doctor if: 
? · Your chest pain is not improving after 1 week. Where can you learn more? Go to http://gail-lalo.info/. Enter I174 in the search box to learn more about \"Chest Contusion: Care Instructions. \" Current as of: March 20, 2017 Content Version: 11.4 © 2891-5805 [employer]. Care instructions adapted under license by [employer] (which disclaims liability or warranty for this information). If you have questions about a medical condition or this instruction, always ask your healthcare professional. [address] [address] any warranty or liability for your use of this information. Introducing [facility]!    
 Starla Colbert introduces [employer] patient portal. Now you can access parts of your medical record, email your doctor's office, and request medication refills online. 1. In your internet browser, go to [email or web address] 2. Click on the First Time User? Click Here link in the Sign In box. You will see the New Member Sign Up page. 3. Enter your [employer] Access Code exactly as it appears below. You will not need to use this code after youve completed the sign-up process. If you do not sign up before the expiration date, you must request a new code. · [employer] Access Code: [email or web address] Expires: 9/19/2018 11:12 AM 
 
4. Enter the last four digits of your Social Security Number (xxxx) and Date of Birth (mm/dd/yyyy) as indicated and click Submit. You will be taken to the next sign-up page. 5. Create a [employer] ID. This will be your [employer] login ID and cannot be changed, so think of one that is secure and easy to remember. 6. Create a [employer] password. You can change your password at any time. 7. Enter your Password Reset Question and Answer. This can be used at a later time if you forget your password. 8. Enter your e-mail address. You will receive e-mail notification when new information is available in [address] 9. Click Sign Up. You can now view and download portions of your medical record. 10. Click the Download Summary menu link to download a portable copy of your medical information. If you have questions, please visit the Frequently Asked Questions section of the [employer] website. Remember, [employer] is NOT to be used for urgent needs. For medical emergencies, dial 911. Now available from your iPhone and Android! Please provide this summary of care documentation to your next provider. Your primary care clinician is listed as Mahesh Barton. If you have any questions after today's visit, please call [phone].

## 2018-07-05 NOTE — PROGRESS NOTES
[address] [facility] [address]   Office [phone], Fax [phone]    Subjective:     CC: Fall Thursday on L side of chest (5th rib)  History provided by patient     HPI:  Karyle Savoy is a 74 y.o. BLACK OR  male presents with Fall on Thursday on L side of chest (5th rib). Significant hx renal transplant, s/p TURP, BPH, ED     Fall on Thursday   - Night time, forgot that there was a plastic items that he left earlier and tripped, initial had \"whiplash\" but doing \"okay\" now.   - ROS: sore 6/10, no sob, no head trauma, no loc. Worse in the morning (OTC aspicream helps), no blood when urinating  - Fall prevention: vision (got a month ago), no weakness in legs, enough lighting, no slippery areas at home    Medication reviewed. Allergy reviewed.     ROS (bolded are positive):   General Negative for fever, chills, changes in weight, changes in appetite   HEENT Negative for hearing loss, earache, congestion, sore throat   CV Negative for chest pain, palpitations, edema   Respiratory Negative for cough, shortness of breath, wheezing   GI Negative for change in bowel habits, abdominal pain, black or bloody stools, nausea or vomiting    Negative for frequency, dysuria, hematuria, vaginal discharge   MSK Negative for back pain, joint pain, muscle pain   Breast Negative for breast lumps, nipple discharge, galactorrhea   Skin Negative for itching, rash, hives   Neuro Negative for dizziness, headache, confusion, weakness   Psych Negative for anxiety, depression, change in mood   Heme/lymph Negative for bleeding, bruising, pallor     Past Medical History:   Diagnosis Date    BPH (benign prostatic hyperplasia)     Chronic GERD     Gout     Hypertension     Ill-defined condition     dialysis access to left arm    Meningitis     Renal transplant recipient     S/P TURP 2015    Tobacco use disorder        Current Outpatient Prescriptions on File Prior to Visit   Medication Sig Dispense Refill    doxazosin (CARDURA) 2 mg tablet Take 2 mg by mouth nightly.  calcitRIOL (ROCALTROL) 0.25 mcg capsule Take 0.25 mcg by mouth daily.  allopurinol (ZYLOPRIM) 100 mg tablet Take 100 mg by mouth daily.  megestrol (MEGACE) 40 mg tablet Take 40 mg by mouth daily.  aspirin delayed-release 81 mg tablet Take 81 mg by mouth daily.  predniSONE (DELTASONE) 2.5 mg tablet Take 2.5 mg by mouth daily.  timolol (TIMOPTIC) 0.5 % ophthalmic solution Administer 1 Drop to left eye daily.  testosterone cypionate (DEPOTESTOTERONE CYPIONATE) 200 mg/mL injection 200 mg by IntraMUSCular route every fourteen (14) days.  diltiazem CD (CARDIZEM CD) 240 mg ER capsule Take 240 mg by mouth two (2) times a day. 3    mycophenolate (CELLCEPT) 250 mg capsule Take 250 mg by mouth two (2) times a day.  furosemide (LASIX) 40 mg tablet Take 40 mg by mouth daily.  pantoprazole (PROTONIX) 40 mg tablet Take 40 mg by mouth daily.  tacrolimus (PROGRAF) 1 mg capsule Take 2 mg by mouth every twelve (12) hours.  fluticasone (FLONASE) 50 mcg/actuation nasal spray 2 Sprays by Both Nostrils route daily as needed for Rhinitis or Allergies.  ondansetron (ZOFRAN ODT) 4 mg disintegrating tablet Take 1 Tab by mouth every eight (8) hours as needed for Nausea. 12 Tab 0     No current facility-administered medications on file prior to visit.         No Known Allergies    Past Surgical History:   Procedure Laterality Date    HX CATARACT REMOVAL      HX TURP  2015    TRANSPLANTATION OF KIDNEY Bilateral [id number],[id number]    x2       Social History     Social History    Marital status: UNKNOWN     Spouse name: N/A    Number of children: 3    Years of education: N/A     Occupational History    Retired       Social History Main Topics    Smoking status: Current Every Day Smoker     Packs/day: 0.50     Types: Cigarettes    Smokeless tobacco: Not on file    Alcohol use Yes      Comment: socially    Drug use: No    Sexual activity: Yes     Other Topics Concern    Not on file     Social History Narrative       Patient Active Problem List   Diagnosis Code    Renal transplant recipient Z94.0    Bilateral lower extremity edema R60.0    Erectile dysfunction N52.9    Chronic GERD K21.9    S/P TURP (status post transurethral resection of prostate) Z90.79    Benign prostatic hyperplasia without lower urinary tract symptoms N40.0    Vision loss of left eye H54.62    Chronic gout of multiple sites due to renal impairment M1A. 39X0         Objective:   Vitals - reviewed  Visit Vitals    /79    Pulse 77    Temp 97 °F (36.1 °C) (Oral)    Resp 16    Ht 5' 10\" (1.778 m)    Wt 179 lb (81.2 kg)    SpO2 99%    BMI 25.68 kg/m2        Physical Exam   Constitutional: He is oriented to person, place, and time. He appears well-developed and well-nourished. No distress. HENT:   Head: Atraumatic. Eyes: Conjunctivae are normal. Pupils are equal, round, and reactive to light. Neck: Normal range of motion. Neck supple. No thyromegaly present. Cardiovascular:   Murmur (aortic stenosis murmur 3/6) heard. Pulmonary/Chest: Effort normal and breath sounds normal. No respiratory distress. Tenderness: mild 5th rib TTP. Abdominal: Soft. Bowel sounds are normal. He exhibits no distension. Musculoskeletal: Normal range of motion. He exhibits no edema or tenderness. Strength 5/5 all extremities      Neurological: He is alert and oriented to person, place, and time. No cranial nerve deficit. Skin: Skin is warm and dry. Psychiatric: He has a normal mood and affect. His behavior is normal.   Vitals reviewed. Pertinent Labs/Studies: n/a      Assessment and orders:       ICD-10-CM ICD-9-CM    1. Rib contusion, left, initial encounter W55.551Y 922.1    2.  Erectile dysfunction following urethral surgery N52.33 607.84 sildenafil citrate (VIAGRA) 100 mg tablet     Diagnoses and all orders for this visit: 1. Rib contusion, left, initial encounter. No sob, just sore. No indication for xray. Advised pt on to be careful. Discussed about fall prevention at home. 2. Erectile dysfunction following urethral surgery. Have been taking it for 15 years. Aware of side effects. -     sildenafil citrate (VIAGRA) 100 mg tablet; Take 1 Tab by mouth as needed. Follow-up Disposition:  Return if symptoms worsen or fail to improve. Pt was discussed and seen by Dr Andry Edward (attending physician). I have reviewed patient medical and social history and medications. I have reviewed pertinent labs results and other data. I have discussed the diagnosis with the patient and the intended plan as seen in the above orders. The patient has received an after-visit summary and questions were answered concerning future plans. I have discussed medication side effects and warnings with the patient as well.     Virgil Gross MD  Resident [address] [place] [address]  07/05/18

## 2018-07-05 NOTE — TELEPHONE ENCOUNTER
Spoke with patient who states he fell last Thursday in back yard. Patient states he tripped off mulch and hit  left side ofchest on side of stool. Patient states left side of chest swollen and painful. Advised patient to schedule  an apppointment. Patient verbalized understanding.  Appointment scheduled for today (7/5/18) at 2:00pm.

## 2018-07-05 NOTE — TELEPHONE ENCOUNTER
Patient calling for appt and notes he fell last Thursday in back yard. Tripped off some mulch head first and hit chest on side of stool. Notes area around heart sore,painful and swollen.     Called for nurse advice and nurse Alana Valentin assisted further

## 2018-07-05 NOTE — PATIENT INSTRUCTIONS
Chest Contusion: Care Instructions  Your Care Instructions  A chest contusion, or bruise, is caused by a fall or direct blow to the chest. Car crashes, falls, getting punched, and injury from bicycle handlebars are common causes of chest contusions. A very forceful blow to the chest can injure the heart or blood vessels in the chest, the lungs, the airway, the liver, or the spleen. Pain may be caused by an injury to muscles, cartilage, or ribs. Deep breathing, coughing, or sneezing can increase your pain. Lying on the injured area also can cause pain. Follow-up care is a key part of your treatment and safety. Be sure to make and go to all appointments, and call your doctor if you are having problems. It's also a good idea to know your test results and keep a list of the medicines you take. How can you care for yourself at home? · Rest and protect the injured or sore area. Stop, change, or take a break from any activity that may be causing your pain. · Put ice or a cold pack on the area for 10 to 20 minutes at a time. Put a thin cloth between the ice and your skin. · After 2 or 3 days, if your swelling is gone, apply a heating pad set on low or a warm cloth to your chest. Some doctors suggest that you go back and forth between hot and cold. Put a thin cloth between the heating pad and your skin. · Do not wrap or tape your ribs for support. This may cause you to take smaller breaths, which could increase your risk of pneumonia and lung collapse. · Ask your doctor if you can take an over-the-counter pain medicine, such as acetaminophen (Tylenol), ibuprofen (Advil, Motrin), or naproxen (Aleve). Be safe with medicines. Read and follow all instructions on the label. · Take your medicines exactly as prescribed. Call your doctor if you think you are having a problem with your medicine.   · Gentle stretching and massage may help you feel better after a few days of rest. Stretch slowly to the point just before discomfort begins, then hold the stretch for at least 15 to 30 seconds. Do this 3 or 4 times per day. · As your pain gets better, slowly return to your normal activities. Be patient, because chest bruises can take weeks or months to heal. Any increased pain may be a sign that you need to rest a while longer. When should you call for help? Call 911 anytime you think you may need emergency care. For example, call if:  ? · You have severe trouble breathing. ? · You cough up blood. ?Call your doctor now or seek immediate medical care if:  ? · You have belly pain. ? · You are dizzy or lightheaded, or you feel like you may faint. ? · You develop new symptoms with the chest pain. ? · Your chest pain gets worse. ? · You have a fever. ? · You have some shortness of breath. ? · You have a cough that brings up mucus from the lungs. ? Watch closely for changes in your health, and be sure to contact your doctor if:  ? · Your chest pain is not improving after 1 week. Where can you learn more? Go to http://gail-lalo.info/. Enter I174 in the search box to learn more about \"Chest Contusion: Care Instructions. \"  Current as of: March 20, 2017  Content Version: 11.4  © 4831-1712 [employer]. Care instructions adapted under license by [employer] (which disclaims liability or warranty for this information). If you have questions about a medical condition or this instruction, always ask your healthcare professional. [place] [address] any warranty or liability for your use of this information.

## 2019-04-30 ENCOUNTER — HOSPITAL ENCOUNTER (OUTPATIENT)
Dept: CARDIAC REHAB | Age: 69
Discharge: HOME | End: 2019-04-30
Payer: MEDICARE

## 2019-04-30 VITALS — HEIGHT: 71 IN | BODY MASS INDEX: 26.25 KG/M2 | WEIGHT: 187.5 LBS

## 2019-04-30 PROCEDURE — 93798 PHYS/QHP OP CAR RHAB W/ECG: CPT

## 2019-04-30 RX ORDER — CLOPIDOGREL BISULFATE 75 MG/1
75 TABLET ORAL DAILY
Status: ON HOLD | COMMUNITY
Start: 2019-03-22 | End: 2020-03-14

## 2019-04-30 NOTE — CARDIO/PULMONARY
Alanna Diaz 79 y.o. 
 
presented to cardiac wellness for orientation and exercise tolerance test today with a primary diagnosis of Aortic stenosis and TAVR . Alanna Diaz has a history of Aortic Stenosis. Cardiac risk factors include HTN and these were reviewed with pt. Pt has had long standing issues with renal failure having first renal transplant at age 25. He had second renal transplant 13 years ago which has failed and pt now on hemodialysis 3 days/wk (MWF). Alanna Diaz is  and lives with his supportive wife of 55 years, Darlene Johnson. He has 3 adult children who are very supportive. PHQ9, depression score, is 10 and this is considered moderate. The result was discussed with patient who affirms score however he reported that he feel S/S of aortic stenosis (fatigue, SOB) caused him to be more fatigue then usual.  Now with TAVR, he is feeling better and wants to improve his energy level. He denies feeling depressed or stressed. Patient denied chest pain or SOB during 6 minute walk however it was noted that his rhythm was irregular and telemetry showed Afib with rare PVC. No ECG had been provided by referring MD and pt denies every being told he had afib in the past.  Pt was exercised at low level as rhythm was wnl with Rivas of 11. Attempted to call Dr. Bertha Hernandez office and was referred to Dr. Bertha Hernandez NP. She has not returned call at this time. Discussed rhythm with Alanna Diza and informed him that his MD has been notified. Asked he not return to rehab until this rhythm is checked by MD/cardiologist. However,  Alanna Diaz would like to attend rehab 2 days/wk once permitted. EF is 65%. Education manual given to patient. Addendum @ 1320:  Received call from Dr. Bertha Hernandez NP referring this issues to medical cardiologist, Dr. Nancy Lundberg. Dr. Lawrence Watkins office called and left information regarding rhythm issues.   Faxed information including rhythm strips from 6-min exercise walk and need for rhytnm to be address before pt can return to Cardiac Wellness. Addendum @ 1430:  Received call from Dr. Judi Kimble and discussed pt's rhythm during cardiac rehab intake session. Explained pt needed to have rhythm address prior to returning to cardiac rehab. Dr. Judi Kimble reported Mr. Suki Cox was actually Dr. Norma Florentino pt and he would contact Dr. Norma Florentino office to have pt seen by his office. Called Mr. Suki Cox and left brief message on answering machine.

## 2019-05-08 ENCOUNTER — TELEPHONE (OUTPATIENT)
Dept: CARDIAC REHAB | Age: 69
End: 2019-05-08

## 2019-05-08 NOTE — TELEPHONE ENCOUNTER
Cardiac Rehab: 5/8/2019 @ 0835:  Coit Mike with [facility] via phone conversation regarding f/u appt with Dr Regine Johnson and returning to Cardiac Rehab. Pt reported he has appt with Dr Regine Johnson on 5/16/19. Instructed pt once he receive okay from Dr. Regine Johnson to return to Cardiac Rehab he should call Ana to set up return appt.

## 2019-07-08 NOTE — CARDIO/PULMONARY
Pt presented to [facility] for Phase II Intake appointment. 4/30/19. Pt on monitor in a fib- new for pt. MD- cardiologist- Dr. Leann Castro made aware via fax, phone call. Follow up phone call made to pt regarding status on 5/8/29. Pt stated he has an appointment with Dr. Leann Castro on 5/16/19. Pt asked to please call Cardiac Rehab to communicate Dr. Estrellita Miller of care for new a fib and to schedule exercise appointments. Pt has not contacted Cardiac Rehab. Attempted to call each week from 5/27/19- 6/17/19. No ability to leave a message. Twice, phone was answered, but hung up. Pt is discharged from [facility]. Walker Andres

## 2019-08-13 ENCOUNTER — TELEPHONE (OUTPATIENT)
Dept: CARDIAC REHAB | Age: 69
End: 2019-08-13

## 2019-08-13 NOTE — TELEPHONE ENCOUNTER
Pt admitted to [facility] on 4/30/19. Pt s/p TAVR. Upon heart monitoring initiation, pt found to be in a fib. Pt was asked to follow up with Cardiologist and Cardiac Surgeon and to contact rehab with an update and to schedule exercise appointments. Pt did not call. Follow up phone call on 5/8/19, pt stated he would call after his 5/16/19 cardiologist appt. No calls made to department. Attempted to call patient x4. Pt was discharged on 7/8/19. Pt called today and left a message to resume. Return call to pt. Voice mail left to return call to discuss with nursing staff.   Sky Díaz RN

## 2019-10-14 ENCOUNTER — TELEPHONE (OUTPATIENT)
Dept: FAMILY MEDICINE CLINIC | Age: 69
End: 2019-10-14

## 2019-10-14 NOTE — TELEPHONE ENCOUNTER
Product: Valacyclovir HCL 1 Gram T   Qty: 4 Tab  Directions: take 2 tablets by mouth two times daily

## 2019-10-16 NOTE — TELEPHONE ENCOUNTER
Verified patient by 2 identifiers. Inquired if patient need a refill of Valcyclovir. Patient states he don;t know anything about that prescription and asked where the request came from. Informed patient again that request came from [facility]. Patient states that he don't use that Pharmacy. Informed patient I will decline request as it is not needed.

## 2020-03-13 PROCEDURE — 02HV33Z INSERTION OF INFUSION DEVICE INTO SUPERIOR VENA CAVA, PERCUTANEOUS APPROACH: ICD-10-PCS | Performed by: INTERNAL MEDICINE

## 2020-03-13 PROCEDURE — 75810000455 HC PLCMT CENT VENOUS CATH LVL 2 5182

## 2020-03-13 PROCEDURE — 94762 N-INVAS EAR/PLS OXIMTRY CONT: CPT

## 2020-03-13 PROCEDURE — 99285 EMERGENCY DEPT VISIT HI MDM: CPT

## 2020-03-14 ENCOUNTER — APPOINTMENT (OUTPATIENT)
Dept: CT IMAGING | Age: 70
DRG: 193 | End: 2020-03-14
Attending: INTERNAL MEDICINE
Payer: MEDICARE

## 2020-03-14 ENCOUNTER — APPOINTMENT (OUTPATIENT)
Dept: GENERAL RADIOLOGY | Age: 70
DRG: 193 | End: 2020-03-14
Attending: EMERGENCY MEDICINE
Payer: MEDICARE

## 2020-03-14 ENCOUNTER — APPOINTMENT (OUTPATIENT)
Dept: CT IMAGING | Age: 70
DRG: 193 | End: 2020-03-14
Attending: EMERGENCY MEDICINE
Payer: MEDICARE

## 2020-03-14 ENCOUNTER — HOSPITAL ENCOUNTER (INPATIENT)
Age: 70
LOS: 3 days | Discharge: HOME HEALTH | DRG: 193 | End: 2020-03-17
Attending: EMERGENCY MEDICINE | Admitting: INTERNAL MEDICINE
Payer: MEDICARE

## 2020-03-14 DIAGNOSIS — R42 DIZZINESS: ICD-10-CM

## 2020-03-14 DIAGNOSIS — D64.9 ACUTE ANEMIA: ICD-10-CM

## 2020-03-14 DIAGNOSIS — S09.90XA INJURY OF HEAD, INITIAL ENCOUNTER: Primary | ICD-10-CM

## 2020-03-14 DIAGNOSIS — D72.829 LEUKOCYTOSIS, UNSPECIFIED TYPE: ICD-10-CM

## 2020-03-14 DIAGNOSIS — S70.12XA HEMATOMA OF LEFT THIGH, INITIAL ENCOUNTER: ICD-10-CM

## 2020-03-14 PROBLEM — Z99.2 ESRD ON HEMODIALYSIS (HCC): Status: ACTIVE | Noted: 2020-03-14

## 2020-03-14 PROBLEM — D62 ACUTE BLOOD LOSS ANEMIA: Status: ACTIVE | Noted: 2020-03-14

## 2020-03-14 PROBLEM — J18.9 RIGHT UPPER LOBE PNEUMONIA: Status: ACTIVE | Noted: 2020-03-14

## 2020-03-14 PROBLEM — I35.9 AORTIC VALVULAR DISORDER: Status: ACTIVE | Noted: 2020-03-14

## 2020-03-14 PROBLEM — N18.6 ESRD ON HEMODIALYSIS (HCC): Status: ACTIVE | Noted: 2020-03-14

## 2020-03-14 LAB
ALBUMIN SERPL-MCNC: 2.5 G/DL (ref 3.5–5)
ALBUMIN/GLOB SERPL: 0.7 {RATIO} (ref 1.1–2.2)
ALP SERPL-CCNC: 88 U/L (ref 45–117)
ALT SERPL-CCNC: 18 U/L (ref 12–78)
ANION GAP SERPL CALC-SCNC: 5 MMOL/L (ref 5–15)
APTT PPP: 28.1 SEC (ref 22.1–32)
AST SERPL-CCNC: 39 U/L (ref 15–37)
B PERT DNA SPEC QL NAA+PROBE: NOT DETECTED
BASOPHILS # BLD: 0.2 K/UL (ref 0–0.1)
BASOPHILS NFR BLD: 1 % (ref 0–1)
BILIRUB SERPL-MCNC: 0.4 MG/DL (ref 0.2–1)
BNP SERPL-MCNC: 8165 PG/ML
BORDETELLA PARAPERTUSSIS PCR, BORPAR: NOT DETECTED
BUN SERPL-MCNC: 20 MG/DL (ref 6–20)
BUN/CREAT SERPL: 4 (ref 12–20)
C PNEUM DNA SPEC QL NAA+PROBE: NOT DETECTED
CALCIUM SERPL-MCNC: 8.6 MG/DL (ref 8.5–10.1)
CHLORIDE SERPL-SCNC: 100 MMOL/L (ref 97–108)
CO2 SERPL-SCNC: 31 MMOL/L (ref 21–32)
COMMENT, HOLDF: NORMAL
CREAT SERPL-MCNC: 4.9 MG/DL (ref 0.7–1.3)
DIFFERENTIAL METHOD BLD: ABNORMAL
EOSINOPHIL # BLD: 0.9 K/UL (ref 0–0.4)
EOSINOPHIL NFR BLD: 5 % (ref 0–7)
ERYTHROCYTE [DISTWIDTH] IN BLOOD BY AUTOMATED COUNT: 21.2 % (ref 11.5–14.5)
FLUAV H1 2009 PAND RNA SPEC QL NAA+PROBE: NOT DETECTED
FLUAV H1 RNA SPEC QL NAA+PROBE: NOT DETECTED
FLUAV H3 RNA SPEC QL NAA+PROBE: NOT DETECTED
FLUAV SUBTYP SPEC NAA+PROBE: NOT DETECTED
FLUBV RNA SPEC QL NAA+PROBE: NOT DETECTED
GLOBULIN SER CALC-MCNC: 3.4 G/DL (ref 2–4)
GLUCOSE BLD STRIP.AUTO-MCNC: 86 MG/DL (ref 65–100)
GLUCOSE BLD STRIP.AUTO-MCNC: 92 MG/DL (ref 65–100)
GLUCOSE SERPL-MCNC: 80 MG/DL (ref 65–100)
HADV DNA SPEC QL NAA+PROBE: NOT DETECTED
HCOV 229E RNA SPEC QL NAA+PROBE: NOT DETECTED
HCOV HKU1 RNA SPEC QL NAA+PROBE: NOT DETECTED
HCOV NL63 RNA SPEC QL NAA+PROBE: NOT DETECTED
HCOV OC43 RNA SPEC QL NAA+PROBE: NOT DETECTED
HCT VFR BLD AUTO: 23.1 % (ref 36.6–50.3)
HCT VFR BLD AUTO: 23.9 % (ref 36.6–50.3)
HEMOCCULT STL QL: POSITIVE
HGB BLD-MCNC: 7.1 G/DL (ref 12.1–17)
HGB BLD-MCNC: 7.5 G/DL (ref 12.1–17)
HMPV RNA SPEC QL NAA+PROBE: NOT DETECTED
HPIV1 RNA SPEC QL NAA+PROBE: NOT DETECTED
HPIV2 RNA SPEC QL NAA+PROBE: NOT DETECTED
HPIV3 RNA SPEC QL NAA+PROBE: NOT DETECTED
HPIV4 RNA SPEC QL NAA+PROBE: NOT DETECTED
IMM GRANULOCYTES # BLD AUTO: 0.2 K/UL (ref 0–0.04)
IMM GRANULOCYTES NFR BLD AUTO: 1 % (ref 0–0.5)
INR PPP: 1.1 (ref 0.9–1.1)
LYMPHOCYTES # BLD: 2.8 K/UL (ref 0.8–3.5)
LYMPHOCYTES NFR BLD: 16 % (ref 12–49)
M PNEUMO DNA SPEC QL NAA+PROBE: NOT DETECTED
MAGNESIUM SERPL-MCNC: 1.8 MG/DL (ref 1.6–2.4)
MCH RBC QN AUTO: 35.3 PG (ref 26–34)
MCHC RBC AUTO-ENTMCNC: 30.7 G/DL (ref 30–36.5)
MCV RBC AUTO: 114.9 FL (ref 80–99)
MONOCYTES # BLD: 1.2 K/UL (ref 0–1)
MONOCYTES NFR BLD: 7 % (ref 5–13)
NEUTS SEG # BLD: 12.2 K/UL (ref 1.8–8)
NEUTS SEG NFR BLD: 70 % (ref 32–75)
NRBC # BLD: 0.03 K/UL (ref 0–0.01)
NRBC BLD-RTO: 0.2 PER 100 WBC
PHOSPHATE SERPL-MCNC: 3.2 MG/DL (ref 2.6–4.7)
PLATELET # BLD AUTO: 156 K/UL (ref 150–400)
PMV BLD AUTO: 10.8 FL (ref 8.9–12.9)
POTASSIUM SERPL-SCNC: 3.7 MMOL/L (ref 3.5–5.1)
PROT SERPL-MCNC: 5.9 G/DL (ref 6.4–8.2)
PROTHROMBIN TIME: 11 SEC (ref 9–11.1)
RBC # BLD AUTO: 2.01 M/UL (ref 4.1–5.7)
RBC MORPH BLD: ABNORMAL
RSV RNA SPEC QL NAA+PROBE: NOT DETECTED
RV+EV RNA SPEC QL NAA+PROBE: NOT DETECTED
SAMPLES BEING HELD,HOLD: NORMAL
SERVICE CMNT-IMP: NORMAL
SERVICE CMNT-IMP: NORMAL
SODIUM SERPL-SCNC: 136 MMOL/L (ref 136–145)
THERAPEUTIC RANGE,PTTT: NORMAL SECS (ref 58–77)
TROPONIN I SERPL-MCNC: 0.07 NG/ML
TROPONIN I SERPL-MCNC: 0.09 NG/ML
WBC # BLD AUTO: 17.5 K/UL (ref 4.1–11.1)

## 2020-03-14 PROCEDURE — 30233N1 TRANSFUSION OF NONAUTOLOGOUS RED BLOOD CELLS INTO PERIPHERAL VEIN, PERCUTANEOUS APPROACH: ICD-10-PCS | Performed by: EMERGENCY MEDICINE

## 2020-03-14 PROCEDURE — C1751 CATH, INF, PER/CENT/MIDLINE: HCPCS

## 2020-03-14 PROCEDURE — 36415 COLL VENOUS BLD VENIPUNCTURE: CPT

## 2020-03-14 PROCEDURE — 0100U RESPIRATORY PANEL,PCR,NASOPHARYNGEAL: CPT

## 2020-03-14 PROCEDURE — 83880 ASSAY OF NATRIURETIC PEPTIDE: CPT

## 2020-03-14 PROCEDURE — 86900 BLOOD TYPING SEROLOGIC ABO: CPT

## 2020-03-14 PROCEDURE — 87040 BLOOD CULTURE FOR BACTERIA: CPT

## 2020-03-14 PROCEDURE — 70450 CT HEAD/BRAIN W/O DYE: CPT

## 2020-03-14 PROCEDURE — 74011000250 HC RX REV CODE- 250: Performed by: INTERNAL MEDICINE

## 2020-03-14 PROCEDURE — 85025 COMPLETE CBC W/AUTO DIFF WBC: CPT

## 2020-03-14 PROCEDURE — 82962 GLUCOSE BLOOD TEST: CPT

## 2020-03-14 PROCEDURE — P9016 RBC LEUKOCYTES REDUCED: HCPCS

## 2020-03-14 PROCEDURE — 83735 ASSAY OF MAGNESIUM: CPT

## 2020-03-14 PROCEDURE — 65660000000 HC RM CCU STEPDOWN

## 2020-03-14 PROCEDURE — 85018 HEMOGLOBIN: CPT

## 2020-03-14 PROCEDURE — 74011250637 HC RX REV CODE- 250/637: Performed by: INTERNAL MEDICINE

## 2020-03-14 PROCEDURE — 84100 ASSAY OF PHOSPHORUS: CPT

## 2020-03-14 PROCEDURE — 36430 TRANSFUSION BLD/BLD COMPNT: CPT

## 2020-03-14 PROCEDURE — 93005 ELECTROCARDIOGRAM TRACING: CPT

## 2020-03-14 PROCEDURE — 82272 OCCULT BLD FECES 1-3 TESTS: CPT

## 2020-03-14 PROCEDURE — 85610 PROTHROMBIN TIME: CPT

## 2020-03-14 PROCEDURE — 86923 COMPATIBILITY TEST ELECTRIC: CPT

## 2020-03-14 PROCEDURE — 85730 THROMBOPLASTIN TIME PARTIAL: CPT

## 2020-03-14 PROCEDURE — 84484 ASSAY OF TROPONIN QUANT: CPT

## 2020-03-14 PROCEDURE — 73700 CT LOWER EXTREMITY W/O DYE: CPT

## 2020-03-14 PROCEDURE — 71250 CT THORAX DX C-: CPT

## 2020-03-14 PROCEDURE — 71045 X-RAY EXAM CHEST 1 VIEW: CPT

## 2020-03-14 PROCEDURE — 74011250636 HC RX REV CODE- 250/636: Performed by: INTERNAL MEDICINE

## 2020-03-14 PROCEDURE — 80053 COMPREHEN METABOLIC PANEL: CPT

## 2020-03-14 RX ORDER — SODIUM CHLORIDE 0.9 % (FLUSH) 0.9 %
5-40 SYRINGE (ML) INJECTION AS NEEDED
Status: DISCONTINUED | OUTPATIENT
Start: 2020-03-14 | End: 2020-03-17 | Stop reason: HOSPADM

## 2020-03-14 RX ORDER — PREDNISONE 5 MG/1
5 TABLET ORAL DAILY
COMMUNITY
End: 2022-03-24

## 2020-03-14 RX ORDER — PANTOPRAZOLE SODIUM 40 MG/1
40 TABLET, DELAYED RELEASE ORAL DAILY
Status: DISCONTINUED | OUTPATIENT
Start: 2020-03-15 | End: 2020-03-14 | Stop reason: SDUPTHER

## 2020-03-14 RX ORDER — PANTOPRAZOLE SODIUM 40 MG/1
40 TABLET, DELAYED RELEASE ORAL
Status: DISCONTINUED | OUTPATIENT
Start: 2020-03-14 | End: 2020-03-17 | Stop reason: HOSPADM

## 2020-03-14 RX ORDER — MIDODRINE HYDROCHLORIDE 5 MG/1
15 TABLET ORAL 3 TIMES DAILY
Status: DISCONTINUED | OUTPATIENT
Start: 2020-03-14 | End: 2020-03-17 | Stop reason: HOSPADM

## 2020-03-14 RX ORDER — DILTIAZEM HYDROCHLORIDE 30 MG/1
30 TABLET, FILM COATED ORAL
Status: DISCONTINUED | OUTPATIENT
Start: 2020-03-14 | End: 2020-03-14

## 2020-03-14 RX ORDER — DILTIAZEM HYDROCHLORIDE 30 MG/1
120 TABLET, FILM COATED ORAL ONCE
Status: DISCONTINUED | OUTPATIENT
Start: 2020-03-14 | End: 2020-03-14

## 2020-03-14 RX ORDER — SODIUM CHLORIDE 9 MG/ML
250 INJECTION, SOLUTION INTRAVENOUS AS NEEDED
Status: DISCONTINUED | OUTPATIENT
Start: 2020-03-14 | End: 2020-03-17 | Stop reason: HOSPADM

## 2020-03-14 RX ORDER — DILTIAZEM HYDROCHLORIDE 120 MG/1
120 CAPSULE, COATED, EXTENDED RELEASE ORAL DAILY
Status: DISCONTINUED | OUTPATIENT
Start: 2020-03-15 | End: 2020-03-14

## 2020-03-14 RX ORDER — DILTIAZEM HYDROCHLORIDE 30 MG/1
30 TABLET, FILM COATED ORAL
Status: DISCONTINUED | OUTPATIENT
Start: 2020-03-15 | End: 2020-03-16

## 2020-03-14 RX ORDER — MIDODRINE HYDROCHLORIDE 5 MG/1
10 TABLET ORAL 3 TIMES DAILY
Status: ON HOLD | COMMUNITY
End: 2021-05-14 | Stop reason: SDUPTHER

## 2020-03-14 RX ORDER — PREDNISONE 5 MG/1
10 TABLET ORAL
Status: DISCONTINUED | OUTPATIENT
Start: 2020-03-15 | End: 2020-03-17 | Stop reason: HOSPADM

## 2020-03-14 RX ORDER — MEGESTROL ACETATE 40 MG/1
40 TABLET ORAL DAILY
Status: DISCONTINUED | OUTPATIENT
Start: 2020-03-15 | End: 2020-03-17 | Stop reason: HOSPADM

## 2020-03-14 RX ORDER — SODIUM CHLORIDE 0.9 % (FLUSH) 0.9 %
5-40 SYRINGE (ML) INJECTION EVERY 8 HOURS
Status: DISCONTINUED | OUTPATIENT
Start: 2020-03-14 | End: 2020-03-17 | Stop reason: HOSPADM

## 2020-03-14 RX ORDER — DILTIAZEM HYDROCHLORIDE 120 MG/1
120 CAPSULE, COATED, EXTENDED RELEASE ORAL ONCE
Status: DISPENSED | OUTPATIENT
Start: 2020-03-14 | End: 2020-03-15

## 2020-03-14 RX ADMIN — DILTIAZEM HYDROCHLORIDE 30 MG: 30 TABLET, FILM COATED ORAL at 18:45

## 2020-03-14 RX ADMIN — AZITHROMYCIN DIHYDRATE 500 MG: 500 INJECTION, POWDER, LYOPHILIZED, FOR SOLUTION INTRAVENOUS at 12:24

## 2020-03-14 RX ADMIN — WATER 1 G: 1 INJECTION INTRAMUSCULAR; INTRAVENOUS; SUBCUTANEOUS at 12:23

## 2020-03-14 RX ADMIN — PANTOPRAZOLE SODIUM 40 MG: 40 TABLET, DELAYED RELEASE ORAL at 17:16

## 2020-03-14 RX ADMIN — Medication 10 ML: at 21:58

## 2020-03-14 RX ADMIN — Medication 10 ML: at 17:00

## 2020-03-14 RX ADMIN — MIDODRINE HYDROCHLORIDE 15 MG: 5 TABLET ORAL at 21:55

## 2020-03-14 NOTE — ED NOTES
Bedside and Verbal shift change report given to Monica Aguiar (oncoming nurse) by Freida Regan (offgoing nurse). Report included the following information SBAR, ED Summary, MAR and Recent Results.

## 2020-03-14 NOTE — ED TRIAGE NOTES
Pt reports dizziness, generalized weakness x 1 week. Pt had GLF around 1630 today denies hitting head or LOC. Pt received dialysis 3 times per week MWF.  Cardiologist referred him to ED for Hgb of 7.0

## 2020-03-14 NOTE — ED NOTES
Central Line  Date/Time: 3/14/2020 1:51 PM  Performed by: Salvador Gonzalez MD  Authorized by: Salvador Gonzalez MD     Consent:     Consent obtained:  Written    Consent given by:  Patient    Risks discussed:  Arterial puncture, bleeding, incorrect placement, infection, nerve damage and pneumothorax    Alternatives discussed:  No treatment  Pre-procedure details:     Hand hygiene: Hand hygiene performed prior to insertion      Sterile barrier technique: All elements of maximal sterile technique followed      Skin preparation:  2% chlorhexidine    Skin preparation agent: Skin preparation agent completely dried prior to procedure    Anesthesia (see MAR for exact dosages): Anesthesia method:  Local infiltration    Local anesthetic:  Lidocaine 1% w/o epi  Procedure details:     Location:  R internal jugular and R femoral    Patient position:  Flat    Procedural supplies:  Triple lumen    Catheter size:  7.5 Fr    Landmarks identified: yes      Ultrasound guidance: yes      Sterile ultrasound techniques: Sterile gel and sterile probe covers were used      Number of attempts:  4    Successful placement: yes    Post-procedure details:     Post-procedure:  Dressing applied and line sutured    Assessment:  Blood return through all ports and free fluid flow    Patient tolerance of procedure: Tolerated well, no immediate complications    Unsuccessful placement right IJ likely 2/2 previous dialysis catheter placement, successfully converted to right femoral placement, patient tolerated well.

## 2020-03-14 NOTE — ED NOTES
TRANSFER - OUT REPORT:    Verbal report given to david rn(name) on [facility]  being transferred to 334(unit) for routine progression of care       Report consisted of patients Situation, Background, Assessment and   Recommendations(SBAR). Information from the following report(s) SBAR, Kardex, ED Summary and MAR was reviewed with the receiving nurse. Lines:   Peripheral IV 03/14/20 Right Arm (Active)   Site Assessment Clean, dry, & intact 3/14/2020  4:49 AM   Phlebitis Assessment 0 3/14/2020  4:49 AM   Infiltration Assessment 0 3/14/2020  4:49 AM   Dressing Status Clean, dry, & intact 3/14/2020  4:49 AM   Dressing Type Transparent;Tape 3/14/2020  4:49 AM   Hub Color/Line Status Patent; Flushed 3/14/2020  4:49 AM        Opportunity for questions and clarification was provided.       Patient transported with:   Monitor  Registered Nurse

## 2020-03-14 NOTE — ED NOTES
Pt complaining of sever pain at upper arm Iv site. Line still flushing w/o difficulty. Pt demanding both IV's come out.  Both Iv's removed

## 2020-03-14 NOTE — ED NOTES
Assumed care of patient. Introduced self as primary nurse using [address] [place] [address] Nw. Stretcher in low locked position with call bell within reach. Pt updated on plan of care and wait times. Pt instructed to use call bell if assistance is needed.

## 2020-03-14 NOTE — PROGRESS NOTES
Notified by nursing that patient went into A fib with RVR. Start diltiazem PO for now. Hold Eliquis due to concern for left lower extremity thigh bleed.  Cardiology consult in AM.

## 2020-03-14 NOTE — PROGRESS NOTES
Lambert Delgadillo [facility] [address]  [address] [facility], [address] [place] [address]  [phone]      Medical Progress Note      NAME:         Talha Rosas   :        1950  MRM:        [id number]    Date of service: 3/14/2020      Subjective: Patient has been seen and examined as a follow up for multiple medical issues. Chart, labs, diagnostics reviewed. Still feeling weak and ill. Apparently with poor IV access. Objective:    Vital Signs:    Visit Vitals  /79   Pulse (!) 132   Temp 98.6 °F (37 °C)   Resp 20   Ht 5' 10\" (1.778 m)   Wt 86.6 kg (191 lb)   SpO2 100%   BMI 27.41 kg/m²      No intake or output data in the 24 hours ending 20 1359     Physical Examination:    General:   Weak looking although not in any acute distress   Eyes:   pink conjunctivae, PERRLA with no discharge. ENT:   no ottorrhea or rhinorrhea with dry mucous membranes  Neck: no masses, thyroid non-tender and trachea central.  Pulm:  no accessory muscle use, decreased but clear breath sounds without crackles or wheezes  Card:  no JVD or murmurs, has sinus tachycardia, without thrills, bruits. + peripheral edema  Abd:  Soft, non-tender, non-distended, normoactive bowel sounds   Musc:  No cyanosis, clubbing, atrophy or deformities. Skin:  No rashes, bruising or ulcers. Neuro: Awake and alert.  Generally a non focal exam. Follows commands appropriately  Psych:  Has a fair insight to illness     Current Facility-Administered Medications   Medication Dose Route Frequency    0.9% sodium chloride infusion 250 mL  250 mL IntraVENous PRN    [START ON 3/15/2020] cefTRIAXone (ROCEPHIN) 1 g in 0.9% sodium chloride (MBP/ADV) 50 mL  1 g IntraVENous Q24H    azithromycin (ZITHROMAX) 500 mg in 0.9% sodium chloride (MBP/ADV) 250 mL  500 mg IntraVENous Q24H     Current Outpatient Medications   Medication Sig    clopidogrel (PLAVIX) 75 mg tab Take 75 mg by mouth daily.    b complex-vitamin c-folic acid (DIALYVITE) 100-1 mg tab tablet Take 1 Tab by mouth daily.  sildenafil citrate (VIAGRA) 100 mg tablet Take 1 Tab by mouth as needed.  allopurinol (ZYLOPRIM) 100 mg tablet Take 100 mg by mouth daily.  megestrol (MEGACE) 40 mg tablet Take 40 mg by mouth daily.  aspirin delayed-release 81 mg tablet Take 81 mg by mouth daily.  timolol (TIMOPTIC) 0.5 % ophthalmic solution Administer 1 Drop to left eye daily.  testosterone cypionate (DEPOTESTOTERONE CYPIONATE) 200 mg/mL injection 200 mg by IntraMUSCular route every fourteen (14) days.  pantoprazole (PROTONIX) 40 mg tablet Take 40 mg by mouth daily.  fluticasone (FLONASE) 50 mcg/actuation nasal spray 2 Sprays by Both Nostrils route daily as needed for Rhinitis or Allergies. Laboratory data and review:    Recent Labs     03/14/20  0110   WBC 17.5*   HGB 7.1*   HCT 23.1*        Recent Labs     03/14/20  0110      K 3.7      CO2 31   GLU 80   BUN 20   CREA 4.90*   CA 8.6   MG 1.8   PHOS 3.2   ALB 2.5*   SGOT 39*   ALT 18   INR 1.1     No components found for: [place]    Diagnostics:    Telemetry reviewed by me: sinus tachycardia      Assessment and Plan:    Right upper lobe pneumonia ([facility] (3/14/2020): failed out patient Levofloxacin. Check cultures. RVP. Start IV Ceftriaxone and Azithromycin. ED to help get IV access    Acute blood loss anemia (3/14/2020): unclear if solely due to CKD vs suspected hematoma. Apparently on Eliquis. Monitor Hgb. Will transfuse to keep Hgb > 7    ESRD on hemodialysis ([facility] (3/14/2020) / Renal transplant recipient (6/21/2018): on HD on MWFs. Consult renal for HD. GERD (gastroesophageal reflux disease) (6/21/2018): resume PPi    ? Hematoma of left thigh (3/14/2020): post fall. Had been on Levofloxacin. Get CT lower extremity    Aortic valvular disorder (3/14/2020): sees Dr Stacy Rivera.  Verify home medications    Total time spent for the patient's care: 35 Minutes                  Care Plan discussed with: Patient and Nursing Staff    Discussed:  Care Plan and D/C Planning    Prophylaxis:  H2B/PPI    Anticipated Disposition:  Home w/Family           ___________________________________________________    Attending Physician:   Stephanie Yanes MD

## 2020-03-14 NOTE — ED PROVIDER NOTES
The patient is a 63-year-old male with a past medical history significant for BPH, GERD, chronic kidney disease, renal transplant x2, gout, hypertension, hemodialysis, heart valve replacement, chronic anticoagulation who presents to the ED after he fell this evening after being dizzy and lightheaded. The patient is also state that he was diagnosed with pneumonia by his PCP approximately a week ago and has been placed on Levaquin. He further states that approximately 2 days ago he felt a large pop in his left thigh followed by bleeding and swelling. The patient states that he ruptured his hamstring unknowingly. He had labs performed by his PCP a week ago and was told that his hemoglobin was 7.0. He is called his cardiologist who instructed to come to the ER for further evaluation. He denies any further discomfort at this time.            Past Medical History:   Diagnosis Date    BPH (benign prostatic hyperplasia)     Chronic GERD     Chronic kidney disease     Gout     Hypertension     Ill-defined condition     dialysis access to left arm    Meningitis     Renal transplant recipient     S/P TURP 2015    Tobacco use disorder        Past Surgical History:   Procedure Laterality Date    HX CATARACT REMOVAL      HX HEART VALVE SURGERY      HX TURP  2015    TRANSPLANTATION OF KIDNEY Bilateral 1971,2005    x2         Family History:   Problem Relation Age of Onset    Heart Disease Mother     Lung Disease Father     No Known Problems Sister     No Known Problems Brother     No Known Problems Brother        Social History     Socioeconomic History    Marital status:      Spouse name: Latonya Wilcox Number of children: 3    Years of education: 13    Highest education level: Not on file   Occupational History    Occupation: Retired    Social Needs    Financial resource strain: Not hard at all   [employer] insecurity     Worry: Never true     Inability: Never true   Leadjini needs     Medical: No     Non-medical: No   Tobacco Use    Smoking status: Current Every Day Smoker     Packs/day: 0.50     Types: Cigarettes    Smokeless tobacco: Never Used   Substance and Sexual Activity    Alcohol use: Not Currently     Comment: socially    Drug use: No    Sexual activity: Yes   Lifestyle    Physical activity     Days per week: 0 days     Minutes per session: 0 min    Stress: Not at all   Relationships    Social connections     Talks on phone: Three times a week     Gets together: Three times a week     Attends Worship service: More than 4 times per year     Active member of club or organization: No     Attends meetings of clubs or organizations: Never     Relationship status:     Intimate partner violence     Fear of current or ex partner: No     Emotionally abused: No     Physically abused: No     Forced sexual activity: No   Other Topics Concern     Service Not Asked    Blood Transfusions Not Asked    Caffeine Concern Not Asked    Occupational Exposure Not Asked    Hobby Hazards Not Asked    Sleep Concern Not Asked    Stress Concern Not Asked    Weight Concern Not Asked    Special Diet Not Asked    Back Care Not Asked    Exercise Not Asked    Bike Helmet Not Asked   [address] Not Asked    Self-Exams Not Asked   Social History Narrative    Not on file         ALLERGIES: Patient has no known allergies. Review of Systems   All other systems reviewed and are negative. Vitals:    03/13/20 2320   BP: 95/57   Pulse: 100   Resp: 20   Temp: 98.4 °F (36.9 °C)   SpO2: 99%   Weight: 86.6 kg (191 lb)   Height: 5' 10\" (1.778 m)            Physical Exam  Vitals signs and nursing note reviewed. Exam conducted with a chaperone present. CONSTITUTIONAL: Well-appearing; well-nourished; in no apparent distress  HEAD: Normocephalic; atraumatic  EYES: PERRL; EOM intact; conjunctiva and sclera are clear bilaterally.   ENT: No rhinorrhea; normal pharynx with no tonsillar hypertrophy; mucous membranes pink/moist, no erythema, no exudate. NECK: Supple; non-tender; no cervical lymphadenopathy  CARD: Normal S1, S2; no murmurs, rubs, or gallops. Regular rate and rhythm. RESP: Normal respiratory effort; breath sounds clear and equal bilaterally; no wheezes, rhonchi, or rales. ABD: Normal bowel sounds; non-distended; non-tender; no palpable organomegaly, no masses, no bruits. Back Exam: Normal inspection; no vertebral point tenderness, no CVA tenderness. Normal range of motion. EXT: Normal ROM in all four extremities; non-tender to palpation; no swelling or deformity; distal pulses are normal, no edema. SKIN: Warm; dry; ecchymosis in the lateral aspect of the left thigh  NEURO:Alert and oriented x 3, coherent, AMARI-XII grossly intact, sensory and motor are non-focal.        MDM  Number of Diagnoses or Management Options  Acute anemia:   Dizziness:   Hematoma of left thigh, initial encounter:   Injury of head, initial encounter:   Leukocytosis, unspecified type:   Diagnosis management comments: Assessment: Differential diagnosis consist of acute blood loss anemia rule out GI bleed/symptomatic anemia rule out electrolyte abnormality, ACS,/subjective feeling of fever and recent diagnosis of pneumonia rule out sepsis and dehydration. Plan: Lab/IV fluid/EKG/chest x-ray/serial exam/address transfusion needs/consult hospitalist/ monitor and Reevaluate.          Amount and/or Complexity of Data Reviewed  Clinical lab tests: ordered and reviewed  Tests in the radiology section of CPT®: ordered and reviewed  Tests in the medicine section of CPT®: reviewed and ordered  Discussion of test results with the performing providers: yes  Decide to obtain previous medical records or to obtain history from someone other than the patient: yes  Obtain history from someone other than the patient: yes  Review and summarize past medical records: yes  Discuss the patient with other providers: yes  Independent visualization of images, tracings, or specimens: yes    Risk of Complications, Morbidity, and/or Mortality  Presenting problems: moderate  Diagnostic procedures: moderate  Management options: moderate    Patient Progress  Patient progress: stable         Procedures    XRAY INTERPRETATION (ED MD)  Chest Xray  No acute process seen. Normal heart size. No bony abnormalities. No infiltrate. Shweta Rogel MD 12:50 AM    ED EKG interpretation:  Rhythm: normal sinus rhythm; and irregular with premature PACs. Rate (approx.): 94; Axis: left axis deviation; P wave: normal; QRS interval: prolonged; ST/T wave: non-specific changes; in  Lead: Diffusely; right bundle branch block; Other findings: abnormal ekg. This EKG was interpreted by Ryan Kumar MD,ED Provider. PROGRESS NOTE:  Pt has been reexamined by Shweta Rogel MD all available results have been reviewed with pt and any available family. Pt understands sx, dx, and tx in ED. Care plan has been outlined and questions have been answered. Pt and any available family understands and agrees to need for admission to hospital for further tx not available in ED. Pt is ready for admission. Written by Ryan Kumar MD,  3:59 AM    Hospitalist [facility] for Admission  4:00 AM    ED Room Number: ER08/08  Patient Name and age:  Argentina Hermosillo 81 y.o.  male  Working Diagnosis:   1. Injury of head, initial encounter    2. Dizziness    3. Acute anemia    4. Hematoma of left thigh, initial encounter    5. Leukocytosis, unspecified type      Readmission: no  Isolation Requirements:  no  Recommended Level of Care:  telemetry  Code Status:  Full Code  Department:[facility] ED - [phone]  Other: The patient had dialysis yesterday. CONSULT NOTE:  Shweta Rogel MD spoke with Dr. Shireen Roque of the adult hospitalist team. Discussed patient's presentation, history, physical assessment, and available diagnostic results.  He will evaluate, write orders and admit the patient to the hospital. 4:35 AM    .

## 2020-03-14 NOTE — PROGRESS NOTES
TRANSFER - IN REPORT:    Verbal report received from gregoria saenz RN(name) on [facility]  being received from ED(unit) for routine progression of care      Report consisted of patients Situation, Background, Assessment and   Recommendations(SBAR). Information from the following report(s) SBAR, Kardex and Recent Results was reviewed with the receiving nurse. Opportunity for questions and clarification was provided. Assessment completed upon patients arrival to unit and care assumed. .. Pt is super anxious. . due to anxiety pts HR run uptp 150's. .show A. Fib with PACS. .     3507- Up in commode, had BM during this time noted 's. .( A.Fib). .  12 Lead EKG done. Tin Low A. Fib with rapid respond ventricular with lRRB. Tin [place]. Tin Low order is going received and carried out. ..    1900- BP is taken on right arm /65. .    1950-  Bedside and Verbal shift change report given to Lobo Hernandez  (oncoming nurse) by Wayne  (offgoing nurse). Report included the following information SBAR, Kardex and Recent Results.

## 2020-03-14 NOTE — H&P
[address] [facility] Adult  Hospitalist Group  History and Physical    Primary Care Provider: Francisco Moya MD  Date of Service:  3/14/2020    Subjective:     Vanna Taylor is a 81 y.o. male with past medical history of ESRD-DD MWF, BPH, hypertension, came to the ED after having a presyncopal episode. Patient reported that earlier today he  suddenly felt lightheaded and fell. He did not hit his head or lost consciousness. Patient is lately running low blood pressure and takes midodrine. Patient had a blood work which showed low H&H for which he was referred to the ED for blood confusion. He denied chest pain or shortness breath. He was recently diagnosed with pneumonia and started on Levaquin but he developed tendinopathy and the medication was discontinued. Currently he has occasional cough productive of whitish sputum but denied denied fever shortness of breath      Review of Systems:    12 point review of systems was done and found to be negative except 1 mentioned in the HPI    Past Medical History:   Diagnosis Date    BPH (benign prostatic hyperplasia)     Chronic GERD     Chronic kidney disease     Gout     Hypertension     Ill-defined condition     dialysis access to left arm    Meningitis     Renal transplant recipient     S/P TURP 2015    Tobacco use disorder       Past Surgical History:   Procedure Laterality Date    HX CATARACT REMOVAL      HX HEART VALVE SURGERY      HX TURP  2015    TRANSPLANTATION OF KIDNEY Bilateral 1971,2005    x2     Prior to Admission medications    Medication Sig Start Date End Date Taking? Authorizing Provider   clopidogrel (PLAVIX) 75 mg tab Take 75 mg by mouth daily. 3/22/19   Provider, Historical   b complex-vitamin c-folic acid (DIALYVITE) 100-1 mg tab tablet Take 1 Tab by mouth daily. 12/4/18   Provider, Historical   sildenafil citrate (VIAGRA) 100 mg tablet Take 1 Tab by mouth as needed.  7/5/18   Melody Silva MD   allopurinol (ZYLOPRIM) 100 mg tablet Take 100 mg by mouth daily. Babak Genao MD   megestrol (MEGACE) 40 mg tablet Take 40 mg by mouth daily. Babak Genao MD   aspirin delayed-release 81 mg tablet Take 81 mg by mouth daily. Provider, Historical   timolol (TIMOPTIC) 0.5 % ophthalmic solution Administer 1 Drop to left eye daily. Provider, Historical   testosterone cypionate (DEPOTESTOTERONE CYPIONATE) 200 mg/mL injection 200 mg by IntraMUSCular route every fourteen (14) days. Provider, Historical   pantoprazole (PROTONIX) 40 mg tablet Take 40 mg by mouth daily. Babak Genao MD   fluticasone (FLONASE) 50 mcg/actuation nasal spray 2 Sprays by Both Nostrils route daily as needed for Rhinitis or Allergies. Babak Genao MD     No Known Allergies   Family History   Problem Relation Age of Onset    Heart Disease Mother     Lung Disease Father     No Known Problems Sister     No Known Problems Brother     No Known Problems Brother         SOCIAL HISTORY:  Patient resides at home  Patient ambulates with out support  Smoking history: none  Alcohol history:       Objective:       Physical Exam:   Physical Exam  Constitutional:       General: He is not in acute distress. Appearance: Normal appearance. He is not ill-appearing. HENT:      Head: Normocephalic and atraumatic. Right Ear: There is no impacted cerumen. Nose: No rhinorrhea. Mouth/Throat:      Pharynx: No oropharyngeal exudate. Eyes:      General: No scleral icterus. Right eye: No discharge. Left eye: No discharge. Neck:      Musculoskeletal: No neck rigidity or muscular tenderness. Cardiovascular:      Rate and Rhythm: Normal rate and regular rhythm. Heart sounds: No murmur. No friction rub. No gallop. Pulmonary:      Effort: Pulmonary effort is normal. No respiratory distress. Breath sounds: Normal breath sounds. No stridor. No wheezing or rhonchi. Abdominal:      General: Abdomen is flat. There is no distension. Palpations: There is no mass. Tenderness: There is no abdominal tenderness. Hernia: No hernia is present. Musculoskeletal: Normal range of motion. General: No swelling, tenderness, deformity or signs of injury. Skin:     General: Skin is warm and dry. Coloration: Skin is not jaundiced or pale. Findings: No bruising or erythema. Neurological:      General: No focal deficit present. Mental Status: He is alert and oriented to person, place, and time. Cranial Nerves: No cranial nerve deficit. Sensory: No sensory deficit. Motor: No weakness. Coordination: Coordination normal.   Psychiatric:         Mood and Affect: Mood normal.       Cap refill: Brisk, less than 3 seconds  Pulses: 2+, symmetric in all extremities    ECG:    Data Review: All diagnostic labs and studies have been reviewed.     CT chest Groundglass opacification with bronchial wall thickening in the right upper lobe  compatible with pneumonia which may be typical or atypical.    Assessment:   Presyncopae  - likely due to orthostatic hypotension  -Continue with midodrine, check orthostatic vital signs and consider gentle IV hydration  - Telemetry monitoring    Symptomatic anemia  -Multifactorial including  anemia of CKD and questionable blood loss  -Get Hemoccult blood test,  -will arrange for transfusion of PRBC with hemodialysis on Monday    ESRD-DD  Continue hemodialysis per schedule and MWF-  -Nephrology consult in place    Right upper lobe pneumonia  -Partially treated pneumonia  -Levaquin discontinued after patient developed tendinopathy of the hamstrings  -Complet treatment with IV ceftriaxone and Zithromax  -Incentive spirometry    Elevated troponin  -Likely due to renal dysfunction  -Telemetry monitoring, get serial troponin and EKG           1.  FUNCTIONAL STATUS PRIOR TO HOSPITALIZATION (including history of recent falls):    Signed By: Tunde Lei MD     March 14, 2020

## 2020-03-15 PROBLEM — I48.0 PAF (PAROXYSMAL ATRIAL FIBRILLATION) (HCC): Status: ACTIVE | Noted: 2020-03-15

## 2020-03-15 LAB
ALBUMIN SERPL-MCNC: 1.9 G/DL (ref 3.5–5)
ALBUMIN/GLOB SERPL: 0.7 {RATIO} (ref 1.1–2.2)
ALP SERPL-CCNC: 77 U/L (ref 45–117)
ALT SERPL-CCNC: 12 U/L (ref 12–78)
ANION GAP SERPL CALC-SCNC: 8 MMOL/L (ref 5–15)
AST SERPL-CCNC: 18 U/L (ref 15–37)
BASOPHILS # BLD: 0.1 K/UL (ref 0–0.1)
BASOPHILS NFR BLD: 1 % (ref 0–1)
BILIRUB SERPL-MCNC: 0.6 MG/DL (ref 0.2–1)
BUN SERPL-MCNC: 29 MG/DL (ref 6–20)
BUN/CREAT SERPL: 4 (ref 12–20)
CALCIUM SERPL-MCNC: 8 MG/DL (ref 8.5–10.1)
CHLORIDE SERPL-SCNC: 99 MMOL/L (ref 97–108)
CO2 SERPL-SCNC: 28 MMOL/L (ref 21–32)
CREAT SERPL-MCNC: 7.43 MG/DL (ref 0.7–1.3)
DIFFERENTIAL METHOD BLD: ABNORMAL
EOSINOPHIL # BLD: 0.8 K/UL (ref 0–0.4)
EOSINOPHIL NFR BLD: 7 % (ref 0–7)
ERYTHROCYTE [DISTWIDTH] IN BLOOD BY AUTOMATED COUNT: 26 % (ref 11.5–14.5)
GLOBULIN SER CALC-MCNC: 2.8 G/DL (ref 2–4)
GLUCOSE BLD STRIP.AUTO-MCNC: 118 MG/DL (ref 65–100)
GLUCOSE BLD STRIP.AUTO-MCNC: 134 MG/DL (ref 65–100)
GLUCOSE BLD STRIP.AUTO-MCNC: 170 MG/DL (ref 65–100)
GLUCOSE BLD STRIP.AUTO-MCNC: 99 MG/DL (ref 65–100)
GLUCOSE SERPL-MCNC: 94 MG/DL (ref 65–100)
HCT VFR BLD AUTO: 23.4 % (ref 36.6–50.3)
HGB BLD-MCNC: 7.1 G/DL (ref 12.1–17)
IMM GRANULOCYTES # BLD AUTO: 0.1 K/UL (ref 0–0.04)
IMM GRANULOCYTES NFR BLD AUTO: 1 % (ref 0–0.5)
LYMPHOCYTES # BLD: 2 K/UL (ref 0.8–3.5)
LYMPHOCYTES NFR BLD: 17 % (ref 12–49)
MCH RBC QN AUTO: 33 PG (ref 26–34)
MCHC RBC AUTO-ENTMCNC: 30.3 G/DL (ref 30–36.5)
MCV RBC AUTO: 108.8 FL (ref 80–99)
MONOCYTES # BLD: 0.8 K/UL (ref 0–1)
MONOCYTES NFR BLD: 7 % (ref 5–13)
NEUTS SEG # BLD: 7.8 K/UL (ref 1.8–8)
NEUTS SEG NFR BLD: 67 % (ref 32–75)
NRBC # BLD: 0 K/UL (ref 0–0.01)
NRBC BLD-RTO: 0 PER 100 WBC
PLATELET # BLD AUTO: 128 K/UL (ref 150–400)
PMV BLD AUTO: 10.6 FL (ref 8.9–12.9)
POTASSIUM SERPL-SCNC: 3.5 MMOL/L (ref 3.5–5.1)
PROT SERPL-MCNC: 4.7 G/DL (ref 6.4–8.2)
RBC # BLD AUTO: 2.15 M/UL (ref 4.1–5.7)
RBC MORPH BLD: ABNORMAL
SERVICE CMNT-IMP: ABNORMAL
SERVICE CMNT-IMP: NORMAL
SODIUM SERPL-SCNC: 135 MMOL/L (ref 136–145)
WBC # BLD AUTO: 11.6 K/UL (ref 4.1–11.1)

## 2020-03-15 PROCEDURE — 74011250637 HC RX REV CODE- 250/637: Performed by: INTERNAL MEDICINE

## 2020-03-15 PROCEDURE — 65660000000 HC RM CCU STEPDOWN

## 2020-03-15 PROCEDURE — 80053 COMPREHEN METABOLIC PANEL: CPT

## 2020-03-15 PROCEDURE — 82962 GLUCOSE BLOOD TEST: CPT

## 2020-03-15 PROCEDURE — 74011250636 HC RX REV CODE- 250/636: Performed by: INTERNAL MEDICINE

## 2020-03-15 PROCEDURE — 74011000250 HC RX REV CODE- 250: Performed by: INTERNAL MEDICINE

## 2020-03-15 PROCEDURE — 74011000258 HC RX REV CODE- 258: Performed by: INTERNAL MEDICINE

## 2020-03-15 PROCEDURE — 85025 COMPLETE CBC W/AUTO DIFF WBC: CPT

## 2020-03-15 PROCEDURE — 74011636637 HC RX REV CODE- 636/637: Performed by: INTERNAL MEDICINE

## 2020-03-15 PROCEDURE — 36415 COLL VENOUS BLD VENIPUNCTURE: CPT

## 2020-03-15 RX ORDER — ACETAMINOPHEN 325 MG/1
650 TABLET ORAL
Status: DISCONTINUED | OUTPATIENT
Start: 2020-03-15 | End: 2020-03-15

## 2020-03-15 RX ORDER — LIDOCAINE 4 G/100G
1 PATCH TOPICAL EVERY 24 HOURS
Status: DISCONTINUED | OUTPATIENT
Start: 2020-03-15 | End: 2020-03-17 | Stop reason: HOSPADM

## 2020-03-15 RX ORDER — ACETAMINOPHEN 500 MG
1000 TABLET ORAL
Status: DISCONTINUED | OUTPATIENT
Start: 2020-03-15 | End: 2020-03-17 | Stop reason: HOSPADM

## 2020-03-15 RX ADMIN — PANTOPRAZOLE SODIUM 40 MG: 40 TABLET, DELAYED RELEASE ORAL at 15:36

## 2020-03-15 RX ADMIN — PREDNISONE 10 MG: 5 TABLET ORAL at 09:17

## 2020-03-15 RX ADMIN — AZITHROMYCIN DIHYDRATE 500 MG: 500 INJECTION, POWDER, LYOPHILIZED, FOR SOLUTION INTRAVENOUS at 13:15

## 2020-03-15 RX ADMIN — MEGESTROL ACETATE 40 MG: 40 TABLET ORAL at 09:17

## 2020-03-15 RX ADMIN — CEFTRIAXONE SODIUM 1 G: 1 INJECTION, POWDER, FOR SOLUTION INTRAMUSCULAR; INTRAVENOUS at 12:36

## 2020-03-15 RX ADMIN — MIDODRINE HYDROCHLORIDE 15 MG: 5 TABLET ORAL at 09:17

## 2020-03-15 RX ADMIN — MIDODRINE HYDROCHLORIDE 15 MG: 5 TABLET ORAL at 22:44

## 2020-03-15 RX ADMIN — ACETAMINOPHEN 650 MG: 325 TABLET ORAL at 01:46

## 2020-03-15 RX ADMIN — Medication 10 ML: at 06:41

## 2020-03-15 RX ADMIN — DILTIAZEM HYDROCHLORIDE 30 MG: 30 TABLET, FILM COATED ORAL at 06:41

## 2020-03-15 RX ADMIN — PANTOPRAZOLE SODIUM 40 MG: 40 TABLET, DELAYED RELEASE ORAL at 06:41

## 2020-03-15 RX ADMIN — Medication 30 ML: at 22:00

## 2020-03-15 RX ADMIN — MIDODRINE HYDROCHLORIDE 15 MG: 5 TABLET ORAL at 15:36

## 2020-03-15 RX ADMIN — Medication 10 ML: at 14:00

## 2020-03-15 RX ADMIN — DILTIAZEM HYDROCHLORIDE 30 MG: 30 TABLET, FILM COATED ORAL at 17:33

## 2020-03-15 RX ADMIN — DILTIAZEM HYDROCHLORIDE 30 MG: 30 TABLET, FILM COATED ORAL at 12:35

## 2020-03-15 RX ADMIN — ACETAMINOPHEN 1000 MG: 500 TABLET ORAL at 10:12

## 2020-03-15 NOTE — CONSULTS
MICHAEL Benitez is a 81 y.o. male with ESRD on HD, renal transplant admitted with right middle lobe pneumonia and noted to have tachycardia. Telemetry shows sinus rhythm with frequent runs of tachycardia more suspicious for SVT than AF (though cannot entirely exclude AF). He was previously on diltiazem however this was discontinued in the past due to hypotension; he is uncertain what dose he was on however. He has a possible thigh hematoma and was also found to have a +FOBT. Troponin negative x 3. CXR unremarkable. [facility]. He has been started on diltiazem 30 mg tid orally.        ACTIVE PROBLEM LIST     Patient Active Problem List    Diagnosis Date Noted    [facility]oxysmal [facility] fibrillation) ([facility] 03/15/2020    Right upper lobe pneumonia ([facility] 03/14/2020    Hematoma of left thigh 03/14/2020    Acute blood loss anemia 03/14/2020    ESRD on hemodialysis ([facility] 03/14/2020    Aortic valvular disorder 03/14/2020    Renal transplant recipient 06/21/2018    Bilateral lower extremity edema 06/21/2018    Erectile dysfunction 06/21/2018    GERD (gastroesophageal reflux disease) 06/21/2018    S/P TURP (status post transurethral resection of prostate) 06/21/2018    Benign prostatic hyperplasia without lower urinary tract symptoms 06/21/2018    Vision loss of left eye 06/21/2018    Chronic gout of multiple sites due to renal impairment 06/21/2018          MEDICATIONS     Current Facility-Administered Medications   Medication Dose Route Frequency    acetaminophen (TYLENOL) tablet 1,000 mg  1,000 mg Oral Q8H PRN    lidocaine 4 % patch 1 Patch  1 Patch TransDERmal Q24H    0.9% sodium chloride infusion 250 mL  250 mL IntraVENous PRN    megestroL (MEGACE) tablet 40 mg  40 mg Oral DAILY    0.9% sodium chloride infusion 250 mL  250 mL IntraVENous PRN    sodium chloride (NS) flush 5-40 mL  5-40 mL IntraVENous Q8H    sodium chloride (NS) flush 5-40 mL  5-40 mL IntraVENous PRN    cefTRIAXone (ROCEPHIN) 1 g in 0.9% sodium chloride (MBP/ADV) 50 mL  1 g IntraVENous Q24H    azithromycin (ZITHROMAX) 500 mg in 0.9% sodium chloride (MBP/ADV) 250 mL  500 mg IntraVENous Q24H    pantoprazole (PROTONIX) tablet 40 mg  40 mg Oral ACB&D    midodrine (PROAMITINE) tablet 15 mg  15 mg Oral TID    predniSONE (DELTASONE) tablet 10 mg  10 mg Oral DAILY WITH BREAKFAST    dilTIAZem (CARDIZEM) IR tablet 30 mg  30 mg Oral TIDAC          PAST MEDICAL HISTORY     Past Medical History:   Diagnosis Date    BPH (benign prostatic hyperplasia)     Chronic GERD     Chronic kidney disease     Gout     Hypertension     Ill-defined condition     dialysis access to left arm    Meningitis     Renal transplant recipient     S/P TURP 2015    Tobacco use disorder            PAST SURGICAL HISTORY     Past Surgical History:   Procedure Laterality Date    HX CATARACT REMOVAL      HX HEART VALVE SURGERY      HX TURP  2015    TRANSPLANTATION OF KIDNEY Bilateral 1971,2005    x2          ALLERGIES     No Known Allergies       FAMILY HISTORY     Family History   Problem Relation Age of Onset    Heart Disease Mother     Lung Disease Father     No Known Problems Sister     No Known Problems Brother     No Known Problems Brother     negative for cardiac disease     SOCIAL HISTORY     Social History     Socioeconomic History    Marital status:      Spouse name: Hiram Beck Number of children: 3    Years of education: 13    Highest education level: Not on file   Occupational History    Occupation: Retired    Social Needs    Financial resource strain: Not hard at all   [place] insecurity     Worry: Never true     Inability: Never true    Transportation needs     Medical: No     Non-medical: No   Tobacco Use    Smoking status: Current Every Day Smoker     Packs/day: 0.50     Types: Cigarettes    Smokeless tobacco: Never Used   Substance and Sexual Activity    Alcohol use: Not Currently     Comment: socially    Drug use: No    Sexual activity: Yes   Lifestyle    Physical activity     Days per week: 0 days     Minutes per session: 0 min    Stress: Not at all   Relationships    Social connections     Talks on phone: Three times a week     Gets together: Three times a week     Attends Bahai service: More than 4 times per year     Active member of club or organization: No     Attends meetings of clubs or organizations: Never     Relationship status:    Other Topics Concern       MEDICATIONS     Current Facility-Administered Medications   Medication Dose Route Frequency    acetaminophen (TYLENOL) tablet 1,000 mg  1,000 mg Oral Q8H PRN    lidocaine 4 % patch 1 Patch  1 Patch TransDERmal Q24H    0.9% sodium chloride infusion 250 mL  250 mL IntraVENous PRN    megestroL (MEGACE) tablet 40 mg  40 mg Oral DAILY    0.9% sodium chloride infusion 250 mL  250 mL IntraVENous PRN    sodium chloride (NS) flush 5-40 mL  5-40 mL IntraVENous Q8H    sodium chloride (NS) flush 5-40 mL  5-40 mL IntraVENous PRN    cefTRIAXone (ROCEPHIN) 1 g in 0.9% sodium chloride (MBP/ADV) 50 mL  1 g IntraVENous Q24H    azithromycin (ZITHROMAX) 500 mg in 0.9% sodium chloride (MBP/ADV) 250 mL  500 mg IntraVENous Q24H    pantoprazole (PROTONIX) tablet 40 mg  40 mg Oral ACB&D    midodrine (PROAMITINE) tablet 15 mg  15 mg Oral TID    predniSONE (DELTASONE) tablet 10 mg  10 mg Oral DAILY WITH BREAKFAST    dilTIAZem (CARDIZEM) IR tablet 30 mg  30 mg Oral TIDAC       I have reviewed the nurses notes, vitals, problem list, allergy list, medical history, family, social history and medications. REVIEW OF SYMPTOMS      General: Pt denies excessive weight gain or loss. Pt is able to conduct ADL's  HEENT: Denies blurred vision, headaches, hearing loss, epistaxis and difficulty swallowing. Respiratory: Denies cough, congestion, shortness of breath, CANO, wheezing or stridor.   Cardiovascular: Denies precordial pain, palpitations, edema or PND  Gastrointestinal: Denies poor appetite, indigestion, abdominal pain or blood in stool  Genitourinary: Denies hematuria, dysuria, increased urinary frequency  Musculoskeletal: Denies joint pain or swelling from muscles or joints  Neurologic: Denies tremor, paresthesias, headache, or sensory motor disturbance  Psychiatric: Denies confusion, insomnia, depression  Integumentray: Denies rash, itching or ulcers. Hematologic: Denies easy bruising, bleeding       PHYSICAL EXAMINATION      Vitals:    03/15/20 0803 03/15/20 1102 03/15/20 1235 03/15/20 1405   BP: 99/61 105/75     Pulse: 95 100 100 87   Resp: 19 14     Temp:  98.7 °F (37.1 °C)     SpO2: 98% 99%     Weight:       Height:         General: Well developed, in no acute distress. HEENT: No jaundice, oral mucosa moist, no oral ulcers  Neck: Supple, no stiffness, no lymphadenopathy, supple  Heart:  Normal S1/S2 negative S3 or S4. Regular, no murmur, gallop or rub, no jugular venous distention  Respiratory: Clear bilaterally x 4, no wheezing or rales  Abdomen:   Soft, non-tender, bowel sounds are active.   Extremities:  No edema, normal cap refill, no cyanosis. Musculoskeletal: No clubbing, no deformities  Neuro: A&Ox3, speech clear, gait stable, cooperative, no focal neurologic deficits  Skin: Skin color is normal. No rashes or lesions.  Non diaphoretic, moist.  Vascular: 2+ pulses symmetric in all extremities       DIAGNOSTIC DATA      TELEMETRY: SR with frequent runs of tachycardia up to the 130's       LABORATORY DATA      Lab Results   Component Value Date/Time    WBC 11.6 (H) 03/15/2020 05:37 AM    Hemoglobin (POC) 13.6 03/01/2015 01:27 PM    HGB 7.1 (L) 03/15/2020 05:37 AM    Hematocrit (POC) 40 03/01/2015 01:27 PM    HCT 23.4 (L) 03/15/2020 05:37 AM    PLATELET 145 (L) 20/17/3364 05:37 AM    .8 (H) 03/15/2020 05:37 AM      Lab Results   Component Value Date/Time    Sodium 135 (L) 03/15/2020 05:37 AM    Potassium 3.5 03/15/2020 05:37 AM Chloride 99 03/15/2020 05:37 AM    CO2 28 03/15/2020 05:37 AM    Anion gap 8 03/15/2020 05:37 AM    Glucose 94 03/15/2020 05:37 AM    BUN 29 (H) 03/15/2020 05:37 AM    Creatinine 7.43 (H) 03/15/2020 05:37 AM    BUN/Creatinine ratio 4 (L) 03/15/2020 05:37 AM    GFR est AA 9 (L) 03/15/2020 05:37 AM    GFR est non-AA 7 (L) 03/15/2020 05:37 AM    Calcium 8.0 (L) 03/15/2020 05:37 AM    Bilirubin, total 0.6 03/15/2020 05:37 AM    AST (SGOT) 18 03/15/2020 05:37 AM    Alk. phosphatase 77 03/15/2020 05:37 AM    Protein, total 4.7 (L) 03/15/2020 05:37 AM    Albumin 1.9 (L) 03/15/2020 05:37 AM    Globulin 2.8 03/15/2020 05:37 AM    A-G Ratio 0.7 (L) 03/15/2020 05:37 AM    ALT (SGPT) 12 03/15/2020 05:37 AM           ASSESSMENT      1. Tachycardia  2. Pneumonia  3. ESRD on HD  4. Hx renal transplant         PLAN     Continue low dose diltiazem and monitor for efficacy as well has tolerance with respect to SBP. If intolerant, may consider AAD.          Evaristo Cooley MD  Cardiac Electrophysiology / Cardiology    [address]  [address] [place] [address]  [phone] / [phone] Fax   [phone] / [phone] Fax

## 2020-03-15 NOTE — PROGRESS NOTES
Bedside and Verbal shift change report given to Tiffanie Corbett RN (oncoming nurse) by Odilon Woods RN (offgoing nurse). Report included the following information SBAR, Kardex, ED Summary, Intake/Output, Recent Results, Med Rec Status and Cardiac Rhythm SA/A-Fib. 2020: Pt alert, oriented, denies pain. Pt in good spirits tonight, reports feeling much better. Pt wife at bedside assisting pt in personal hygiene. [id number]: Pt resting comfortably throughout the night, no c/o pain. Patient Vitals for the past 12 hrs:   Temp Pulse Resp BP SpO2   03/16/20 0500 -- 82 17 -- 90 %   03/16/20 0400 98.2 °F (36.8 °C) 81 18 105/61 95 %   03/16/20 0300 -- 88 17 -- 95 %   03/16/20 0100 -- 86 19 -- 97 %   03/16/20 0000 98.1 °F (36.7 °C) 99 20 95/64 98 %   03/15/20 2348 -- 97 -- -- --   03/15/20 2300 -- 91 25 -- 94 %   03/15/20 2244 -- 95 -- 114/61 --   03/15/20 2100 -- 88 19 -- 97 %   03/15/20 2000 98.6 °F (37 °C) 83 22 108/90 97 %   03/15/20 1900 -- 91 22 -- 99 %     Bedside and Verbal shift change report given to Andres Arriola RN (oncoming nurse) by Ida Payan (offgoing nurse).  Report included the following information SBAR, Kardex, ED Summary, Intake/Output, Recent Results, Med Rec Status and Cardiac Rhythm SA.

## 2020-03-15 NOTE — PROGRESS NOTES
Contacted Dr. Melinda Grvoe regarding patients femoral venous access. Order was place to remove line after patient completed antibiotics this afternoon. Nursing concerned that patient would have no IV access once this line is removed and patient had already undergone multiple failed attempts at venous access. Dr. Melinda Grove gave verbal order to leave line in place for now but that he would be back up to discuss this with nursing. Will continue to monitor.

## 2020-03-15 NOTE — PROGRESS NOTES
Bedside and Verbal shift change report given to Carla Beth (oncoming nurse) by Darwin Justice RN (offgoing nurse). Report included the following information SBAR, Kardex, ED Summary, Procedure Summary, Intake/Output, Recent Results, Med Rec Status and Cardiac Rhythm A-Fib.     2200: Pt alert, oriented, denies pain. HR b/t 103-118. Noted pt anxious, HR elevated as pt talked about his situation. Pt refused Cardizem 120 mg PO, pt expressed concern the dose was too high. Held Cardizem 120 mg.    2230: Talked to pt concerning his anxiety in relation to his heart rate. Educated pt on practicing positive thinking exercises, noted pt HR responding well with exercises; HR b/t .     0630: Pt resting well throughout the night, HR elevated to 110 then to 98 within seconds. Pt denies chest pain. Bedside and Verbal shift change report given to [address] [place] [address] (oncoming nurse) by Carla Beth (offgoing nurse). Report included the following information SBAR, Kardex, ED Summary, Intake/Output, Recent Results, Med Rec Status and Cardiac Rhythm A-Fib.

## 2020-03-15 NOTE — PROGRESS NOTES
Lambert Delgadillo tracy [place] [address]  [address] [facility], [address] [place] [address]  [phone]      Medical Progress Note      NAME:         Griselda Ann   :        1950  MRM:        [id number]    Date of service: 3/15/2020      Subjective: Patient has been seen and examined as a follow up for multiple medical issues. Chart, labs, diagnostics reviewed. He feels much better today. Minimal cough. No chest pain. He had A fib RVR yesterday evening but rate better today    Objective:    Vital Signs:    Visit Vitals  BP 99/61   Pulse 95   Temp 98.9 °F (37.2 °C)   Resp 19   Ht 5' 10\" (1.778 m)   Wt 89 kg (196 lb 1.6 oz)   SpO2 98%   BMI 28.14 kg/m²          Intake/Output Summary (Last 24 hours) at 3/15/2020 5757  Last data filed at 3/15/2020 0330  Gross per 24 hour   Intake 895 ml   Output --   Net 895 ml        Physical Examination:    General:   Weak and ill looking and not in any acute distress   Eyes:   pink conjunctivae, PERRLA with no discharge. ENT:   no ottorrhea or rhinorrhea with dry mucous membranes  Neck: no masses, thyroid non-tender and trachea central.  Pulm:  decreased breath sounds with minimal scattered crackles. No wheezes  Card:  no JVD or murmurs, has regular and normal S1, S2 without thrills, bruits or peripheral edema  Abd:  Soft, non-tender, non-distended, normoactive bowel sounds   Musc:  No cyanosis, clubbing, atrophy or deformities. Skin:  No rashes, bruising or ulcers. Neuro: Awake and alert.  Generally a non focal exam. Follows commands appropriately  Psych:  Has a good insight and is oriented x 3    Current Facility-Administered Medications   Medication Dose Route Frequency    acetaminophen (TYLENOL) tablet 650 mg  650 mg Oral Q4H PRN    0.9% sodium chloride infusion 250 mL  250 mL IntraVENous PRN    megestroL (MEGACE) tablet 40 mg  40 mg Oral DAILY    0.9% sodium chloride infusion 250 mL  250 mL IntraVENous PRN    sodium chloride (NS) flush 5-40 mL  5-40 mL IntraVENous Q8H    sodium chloride (NS) flush 5-40 mL  5-40 mL IntraVENous PRN    cefTRIAXone (ROCEPHIN) 1 g in 0.9% sodium chloride (MBP/ADV) 50 mL  1 g IntraVENous Q24H    azithromycin (ZITHROMAX) 500 mg in 0.9% sodium chloride (MBP/ADV) 250 mL  500 mg IntraVENous Q24H    pantoprazole (PROTONIX) tablet 40 mg  40 mg Oral ACB&D    midodrine (PROAMITINE) tablet 15 mg  15 mg Oral TID    predniSONE (DELTASONE) tablet 10 mg  10 mg Oral DAILY WITH BREAKFAST    dilTIAZem CD (CARDIZEM CD) capsule 120 mg  120 mg Oral ONCE    dilTIAZem (CARDIZEM) IR tablet 30 mg  30 mg Oral [facility]        Laboratory data and review:    Recent Labs     03/15/20  0537 03/14/20  1922 03/14/20  0110   WBC 11.6*  --  17.5*   HGB 7.1* 7.5* 7.1*   HCT 23.4* 23.9* 23.1*   *  --  156     Recent Labs     03/15/20  0537 03/14/20  0110   * 136   K 3.5 3.7   CL 99 100   CO2 28 31   GLU 94 80   BUN 29* 20   CREA 7.43* 4.90*   CA 8.0* 8.6   MG  --  1.8   PHOS  --  3.2   ALB 1.9* 2.5*   SGOT 18 39*   ALT 12 18   INR  --  1.1     No components found for: [place]    Diagnostics:    Telemetry reviewed by me: atrial fibrillation with variable VR      Assessment and Plan:    Right upper lobe pneumonia ([facility] (3/14/2020): failed out patient Levofloxacin. Blood cultures neg thus far. RVP neg. Clinically better. Continue IV Ceftriaxone and Azithromycin for now and follow clinically. Maybe change to oral in AM      PAF (paroxysmal atrial fibrillation) ([facility] (3/15/2020) / Aortic valvular disorder (3/14/2020): sees Dr Lr Hidden. Went into RVR last evening and was started on low dose Diltiazem. Has soft BPs. Eliquis on hold due to fall and initially suspected hematoma LLE as well as now positive FOBT. Consult cardiology     Acute blood loss anemia (3/14/2020): unclear if solely due to CKD vs suspected hematoma. On Eliquis likely due to A fib and currently on hold. FOBT +. Continue to follow Hgb.  Will transfuse to keep Hgb > 7     ESRD on hemodialysis ([facility] (3/14/2020) / Renal transplant recipient (6/21/2018): on HD on MWFs. Consult renal for HD. Continue prednisone     GERD (gastroesophageal reflux disease) (6/21/2018): Continue PPi     ?Hematoma of left thigh (3/14/2020): post fall. This has been ruled out with a neg CT lower extremity.  Ambulate as tolerated     Total time spent for the patient's care: [address] [place] [address] discussed with: Patient, Family and Nursing Staff    Discussed:  Care Plan and D/C Planning    Prophylaxis:  H2B/PPI    Anticipated Disposition:  Home w/Family           ___________________________________________________    Attending Physician:   Shaggy Maher MD

## 2020-03-16 LAB
ATRIAL RATE: 104 BPM
ATRIAL RATE: 94 BPM
CALCULATED P AXIS, ECG09: 41 DEGREES
CALCULATED R AXIS, ECG10: -64 DEGREES
CALCULATED R AXIS, ECG10: -72 DEGREES
CALCULATED T AXIS, ECG11: 70 DEGREES
CALCULATED T AXIS, ECG11: 78 DEGREES
COMMENT, HOLDF: NORMAL
DIAGNOSIS, 93000: NORMAL
DIAGNOSIS, 93000: NORMAL
HCT VFR BLD AUTO: 22.2 % (ref 36.6–50.3)
HCT VFR BLD AUTO: 25.5 % (ref 36.6–50.3)
HGB BLD-MCNC: 6.9 G/DL (ref 12.1–17)
HGB BLD-MCNC: 8.5 G/DL (ref 12.1–17)
P-R INTERVAL, ECG05: 146 MS
Q-T INTERVAL, ECG07: 374 MS
Q-T INTERVAL, ECG07: 400 MS
QRS DURATION, ECG06: 126 MS
QRS DURATION, ECG06: 128 MS
QTC CALCULATION (BEZET), ECG08: 494 MS
QTC CALCULATION (BEZET), ECG08: 500 MS
SAMPLES BEING HELD,HOLD: NORMAL
TSH SERPL DL<=0.05 MIU/L-ACNC: 0.99 UIU/ML (ref 0.36–3.74)
VENTRICULAR RATE, ECG03: 105 BPM
VENTRICULAR RATE, ECG03: 94 BPM

## 2020-03-16 PROCEDURE — 74011250636 HC RX REV CODE- 250/636: Performed by: INTERNAL MEDICINE

## 2020-03-16 PROCEDURE — 74011000258 HC RX REV CODE- 258: Performed by: INTERNAL MEDICINE

## 2020-03-16 PROCEDURE — 5A1D70Z PERFORMANCE OF URINARY FILTRATION, INTERMITTENT, LESS THAN 6 HOURS PER DAY: ICD-10-PCS | Performed by: INTERNAL MEDICINE

## 2020-03-16 PROCEDURE — 93005 ELECTROCARDIOGRAM TRACING: CPT

## 2020-03-16 PROCEDURE — 97535 SELF CARE MNGMENT TRAINING: CPT | Performed by: OCCUPATIONAL THERAPIST

## 2020-03-16 PROCEDURE — 74011636637 HC RX REV CODE- 636/637: Performed by: INTERNAL MEDICINE

## 2020-03-16 PROCEDURE — 74011250637 HC RX REV CODE- 250/637: Performed by: INTERNAL MEDICINE

## 2020-03-16 PROCEDURE — P9016 RBC LEUKOCYTES REDUCED: HCPCS

## 2020-03-16 PROCEDURE — 36430 TRANSFUSION BLD/BLD COMPNT: CPT

## 2020-03-16 PROCEDURE — 85018 HEMOGLOBIN: CPT

## 2020-03-16 PROCEDURE — 90935 HEMODIALYSIS ONE EVALUATION: CPT

## 2020-03-16 PROCEDURE — 84443 ASSAY THYROID STIM HORMONE: CPT

## 2020-03-16 PROCEDURE — 74011000250 HC RX REV CODE- 250: Performed by: INTERNAL MEDICINE

## 2020-03-16 PROCEDURE — 97530 THERAPEUTIC ACTIVITIES: CPT

## 2020-03-16 PROCEDURE — 97162 PT EVAL MOD COMPLEX 30 MIN: CPT

## 2020-03-16 PROCEDURE — 65660000000 HC RM CCU STEPDOWN

## 2020-03-16 PROCEDURE — 36415 COLL VENOUS BLD VENIPUNCTURE: CPT

## 2020-03-16 PROCEDURE — 97116 GAIT TRAINING THERAPY: CPT

## 2020-03-16 PROCEDURE — 97165 OT EVAL LOW COMPLEX 30 MIN: CPT | Performed by: OCCUPATIONAL THERAPIST

## 2020-03-16 RX ORDER — METOPROLOL TARTRATE 25 MG/1
25 TABLET, FILM COATED ORAL EVERY 12 HOURS
Qty: 60 TAB | Refills: 0 | Status: SHIPPED | OUTPATIENT
Start: 2020-03-16 | End: 2020-04-15

## 2020-03-16 RX ORDER — SODIUM CHLORIDE 9 MG/ML
250 INJECTION, SOLUTION INTRAVENOUS AS NEEDED
Status: DISCONTINUED | OUTPATIENT
Start: 2020-03-16 | End: 2020-03-17 | Stop reason: HOSPADM

## 2020-03-16 RX ORDER — METOPROLOL TARTRATE 25 MG/1
25 TABLET, FILM COATED ORAL EVERY 12 HOURS
Status: DISCONTINUED | OUTPATIENT
Start: 2020-03-16 | End: 2020-03-17 | Stop reason: HOSPADM

## 2020-03-16 RX ADMIN — PANTOPRAZOLE SODIUM 40 MG: 40 TABLET, DELAYED RELEASE ORAL at 16:50

## 2020-03-16 RX ADMIN — EPOETIN ALFA-EPBX 15000 UNITS: 10000 INJECTION, SOLUTION INTRAVENOUS; SUBCUTANEOUS at 15:21

## 2020-03-16 RX ADMIN — Medication 10 ML: at 15:15

## 2020-03-16 RX ADMIN — MIDODRINE HYDROCHLORIDE 15 MG: 5 TABLET ORAL at 22:05

## 2020-03-16 RX ADMIN — MIDODRINE HYDROCHLORIDE 15 MG: 5 TABLET ORAL at 09:30

## 2020-03-16 RX ADMIN — PREDNISONE 10 MG: 5 TABLET ORAL at 09:31

## 2020-03-16 RX ADMIN — Medication 30 ML: at 06:00

## 2020-03-16 RX ADMIN — AZITHROMYCIN DIHYDRATE 500 MG: 500 INJECTION, POWDER, LYOPHILIZED, FOR SOLUTION INTRAVENOUS at 10:41

## 2020-03-16 RX ADMIN — CEFTRIAXONE SODIUM 1 G: 1 INJECTION, POWDER, FOR SOLUTION INTRAMUSCULAR; INTRAVENOUS at 09:41

## 2020-03-16 RX ADMIN — PANTOPRAZOLE SODIUM 40 MG: 40 TABLET, DELAYED RELEASE ORAL at 09:30

## 2020-03-16 RX ADMIN — MIDODRINE HYDROCHLORIDE 15 MG: 5 TABLET ORAL at 15:19

## 2020-03-16 RX ADMIN — MEGESTROL ACETATE 40 MG: 40 TABLET ORAL at 09:30

## 2020-03-16 NOTE — PROGRESS NOTES
Cardiology Progress Note                             [address], [address], [address] [place] [address]                                 Phone [phone]; Fax [phone]        3/16/2020 11:17 AM     Admit Date:           3/14/2020  Admit Diagnosis:  Symptomatic anemia [D64.9]  :          1950   MRN:          [id number]   ASSESSMENT/RECOMMENDATION:   Sinus arrhythmia/frequent PACs:  Hold diltiazem for HD today w lower BP- will start low dose BB - monitor BP and titrate as tolerated. -NO AFib seen on telemetry   -check TSH  -will follow up w Dr Ladonna Olmstead OP    S/p TAVR  Hx of PAFib? On eliquis PTA- currently holding for anemia- resume when safe. PNA- RL: antibiotics per primary team  Chronic hypotension: on midodrine -stable- tolerating dilt  ESRD on HD MWF-   Anemia of chronic disease/acute blood loss   Hx of renal trx    Reviewed plan with the patient and his wife   Will obtain records from Dr Ladonna Olmstead                    0701 - 1900  In: 360 [P.O.:360]  Out: -     Last 3 Recorded Weights in this Encounter    20 1444 03/15/20 0313 20 0604   Weight: 191 lb (86.6 kg) 196 lb 1.6 oz (89 kg) 198 lb 12.8 oz (90.2 kg)          1901 -  0700  In: 575 [P.O.:575]  Out: -     [address] denies palpitations, irregular heart beat, chest pain or LE edema.    Breathing better  Has low BP usually  Followed by Dr Ladonna Olmstead           Current Facility-Administered Medications   Medication Dose Route Frequency    epoetin medhat-epbx (RETACRIT) injection 15,000 Units  15,000 Units IntraVENous DIALYSIS MON, WED & FRI    metoprolol tartrate (LOPRESSOR) tablet 25 mg  25 mg Oral Q12H    acetaminophen (TYLENOL) tablet 1,000 mg  1,000 mg Oral Q8H PRN    lidocaine 4 % patch 1 Patch  1 Patch TransDERmal Q24H    0.9% sodium chloride infusion 250 mL  250 mL IntraVENous PRN    megestroL (MEGACE) tablet 40 mg  40 mg Oral DAILY    0.9% sodium chloride infusion 250 mL  250 mL IntraVENous PRN    sodium chloride (NS) flush 5-40 mL  5-40 mL IntraVENous Q8H    sodium chloride (NS) flush 5-40 mL  5-40 mL IntraVENous PRN    cefTRIAXone (ROCEPHIN) 1 g in 0.9% sodium chloride (MBP/ADV) 50 mL  1 g IntraVENous Q24H    azithromycin (ZITHROMAX) 500 mg in 0.9% sodium chloride (MBP/ADV) 250 mL  500 mg IntraVENous Q24H    pantoprazole (PROTONIX) tablet 40 mg  40 mg Oral ACB&D    midodrine (PROAMITINE) tablet 15 mg  15 mg Oral TID    predniSONE (DELTASONE) tablet 10 mg  10 mg Oral DAILY WITH BREAKFAST      OBJECTIVE               Intake/Output Summary (Last 24 hours) at 3/16/2020 1117  Last data filed at 3/16/2020 0956  Gross per 24 hour   Intake 360 ml   Output --   Net 360 ml       Review of Systems - History obtained from the patient AS PER  HPI    Telemetry sinus arrhythmia/freq  pacs    PHYSICAL EXAM        Visit Vitals  BP (!) 109/96 (BP 1 Location: Right arm, BP Patient Position: Sitting)   Pulse 99   Temp 98.3 °F (36.8 °C)   Resp 18   Ht 5' 10\" (1.778 m)   Wt 198 lb 12.8 oz (90.2 kg)   SpO2 97%   BMI 28.52 kg/m²       Gen: Well-developed, well-nourished, in no acute distress  alert and oriented x 3  HEENT:  Pink conjunctivae, Hearing grossly normal. Conjunctival hemorrhage right eye   Neck: Supple, No JVD  Resp: No accessory muscle use, diminished RL  Card: irregular Rate,Rythm, No murmurs, rubs or gallop. No thrills.    GI:          soft, non-tender   MSK: No cyanosis or clubbing, good capillary refill  Skin: No rashes or ulcers, + bruising right LE  Neuro:  Cranial nerves are grossly intact, moving all four extremities, no focal deficit, follows commands appropriately  Psych:  Good insight, oriented to person, place and time, alert, Nml Affect  LE: trace edema       DATA REVIEW            Laboratory and Imaging have been reviewed by me and are notable for  Recent Labs     03/14/20 1922 03/14/20  1412 03/14/20  0852   TROIQ 0.07* 0.07* 0.09*     Recent Labs     03/15/20  0537 03/14/20  1922 03/14/20  0110   *  --  136   K 3.5  --  3.7   CO2 28  --  31   BUN 29*  --  20   CREA 7.43*  --  4.90*   GLU 94  --  80   PHOS  --   --  3.2   MG  --   --  1.8   WBC 11.6*  --  17.5*   HGB 7.1* 7.5* 7.1*   HCT 23.4* 23.9* 23.1*   *  --  156             Milena Martínez, NP

## 2020-03-16 NOTE — PROGRESS NOTES
Lambert Delgadillo tracy [place] [address]  [address] [facility], [place], [id number] [place] [address]  [phone]      Medical Progress Note      NAME:         Franklin Thompson   :        1950  MRM:        [id number]    Date of service: 3/16/2020      Subjective: Patient has been seen and examined as a follow up for multiple medical issues. Chart, labs, diagnostics reviewed. He feels weak today but no chest pain or SOB. HR better. No bleed noted. Objective:    Vital Signs:    Visit Vitals  /68   Pulse 80   Temp 98 °F (36.7 °C)   Resp 14   Ht 5' 10\" (1.778 m)   Wt 90.2 kg (198 lb 12.8 oz)   SpO2 100%   BMI 28.52 kg/m²          Intake/Output Summary (Last 24 hours) at 3/16/2020 1519  Last data filed at 3/16/2020 1403  Gross per 24 hour   Intake 693 ml   Output --   Net 693 ml        Physical Examination:    General:   Weak and ill looking and not in any acute distress   Eyes:   injected right eye medially, PERRLA with no discharge. ENT:   no ottorrhea or rhinorrhea with dry mucous membranes  Neck: no masses, thyroid non-tender and trachea central.  Pulm:  decreased breath sounds with minimal scattered crackles. No wheezes  Card:  has regular and normal S1, S2 without thrills, bruits  Abd:  Soft, non-tender, non-distended, normoactive bowel sounds   Musc:  No cyanosis, clubbing, atrophy or deformities. Skin:  No rashes, bruising or ulcers. Neuro: Awake and alert.  Generally a non focal exam.   Psych:  Has a good insight and is oriented x 3    Current Facility-Administered Medications   Medication Dose Route Frequency    epoetin medhat-epbx (RETACRIT) injection 15,000 Units  15,000 Units IntraVENous DIALYSIS MON, WED & FRI    metoprolol tartrate (LOPRESSOR) tablet 25 mg  25 mg Oral Q12H    0.9% sodium chloride infusion 250 mL  250 mL IntraVENous PRN    acetaminophen (TYLENOL) tablet 1,000 mg  1,000 mg Oral Q8H PRN    lidocaine 4 % patch 1 Patch  1 Patch TransDERmal Q24H    0.9% sodium chloride infusion 250 mL  250 mL IntraVENous PRN    megestroL (MEGACE) tablet 40 mg  40 mg Oral DAILY    0.9% sodium chloride infusion 250 mL  250 mL IntraVENous PRN    sodium chloride (NS) flush 5-40 mL  5-40 mL IntraVENous Q8H    sodium chloride (NS) flush 5-40 mL  5-40 mL IntraVENous PRN    cefTRIAXone (ROCEPHIN) 1 g in 0.9% sodium chloride (MBP/ADV) 50 mL  1 g IntraVENous Q24H    azithromycin (ZITHROMAX) 500 mg in 0.9% sodium chloride (MBP/ADV) 250 mL  500 mg IntraVENous Q24H    pantoprazole (PROTONIX) tablet 40 mg  40 mg Oral ACB&D    midodrine (PROAMITINE) tablet 15 mg  15 mg Oral TID    predniSONE (DELTASONE) tablet 10 mg  10 mg Oral DAILY WITH BREAKFAST        Laboratory data and review:    Recent Labs     03/16/20  1119 03/15/20  0537 03/14/20  1922 03/14/20  0110   WBC  --  11.6*  --  17.5*   HGB 6.9* 7.1* 7.5* 7.1*   HCT 22.2* 23.4* 23.9* 23.1*   PLT  --  128*  --  156     Recent Labs     03/15/20  0537 03/14/20  0110   * 136   K 3.5 3.7   CL 99 100   CO2 28 31   GLU 94 80   BUN 29* 20   CREA 7.43* 4.90*   CA 8.0* 8.6   MG  --  1.8   PHOS  --  3.2   ALB 1.9* 2.5*   SGOT 18 39*   ALT 12 18   INR  --  1.1     No components found for: [place]    Diagnostics:    Telemetry reviewed by me: atrial fibrillation with variable VR      Assessment and Plan:    Right upper lobe pneumonia ([facility] [address] (3/14/2020): failed out patient Levofloxacin. Blood cultures neg thus far. RVP neg. Clinically unable to determine etiology but overall better. Continue IV Ceftriaxone and Azithromycin while here and change to Omnicef at discharge       PAF (paroxysmal atrial fibrillation) ([facility] [address] (3/15/2020) / Aortic valvular disorder (3/14/2020): sees Dr Chandrika Vann. Rate better controlled. BPs better. Seen by cardiology. Continue low dose Metoprolol. DC Eliquis due to anemia, recent thigh suspected hematoma and positive FOBT. Out patient follow up.       Acute blood loss anemia (3/14/2020): unclear if solely due to CKD vs suspected hematoma. FOBT +. Had been on Eliquis likely due to A fib and currently on hold. Transfuse today 1 unit pRBCs today with HD. Out patient follow up for further testing once he has recuperated from current illness. Transfuse jennifer orr Hgb > 7    ESRD on hemodialysis ([facility] [address] (3/14/2020) / Renal transplant recipient (6/21/2018): on HD on [facility]. Consult renal for HD. Continue prednisone     GERD (gastroesophageal reflux disease) (6/21/2018): Continue PPi     ?Hematoma of left thigh (3/14/2020): post fall. This has been ruled out with a neg CT lower extremity. Ambulate as tolerated    Right eye ecchymosis POA: no other focal symptoms. No visual changes. Eliquis has been discontinued.  Out patient ophthalmology follow up     Total time spent for the patient's care: 30 [address] discussed with: Patient, Family and Nursing Staff    Discussed:  Care Plan and D/C Planning    Prophylaxis:  H2B/PPI    Anticipated Disposition:  Home w/Family vs HH           ___________________________________________________    Attending Physician:   Carlos A Rivera MD

## 2020-03-16 NOTE — PROGRESS NOTES
Problem: Mobility Impaired (Adult and Pediatric)  Goal: *Acute Goals and Plan of Care (Insert Text)  Description: FUNCTIONAL STATUS PRIOR TO ADMISSION: Pt reports decline in function over past 3 weeks - at baseline ambulates without device, negotiates full flight of stairs to bedroom; prior to admission required spouse assist for transportation and occasionally used cane for ambulation    HOME SUPPORT PRIOR TO ADMISSION: lives with spouse who assists as needed. Physical Therapy Goals  Initiated 3/16/2020  1. Patient will move from supine to sit and sit to supine  in bed with modified independence within 7 day(s). 2.  Patient will transfer from bed to chair and chair to bed with supervision/set-up using the least restrictive device within 7 day(s). 3.  Patient will perform sit to stand with supervision/set-up within 7 day(s). 4.  Patient will ambulate with supervision/set-up for 100 feet with the least restrictive device within 7 day(s). 5.  Patient will ascend/descend 7 stairs with handrail(s) with minimal assistance/contact guard assist within 7 day(s). Outcome: Progressing Towards Goal    PHYSICAL THERAPY EVALUATION  Patient: Darrel Grey (64 y.o. male)  Date: 3/16/2020  Primary Diagnosis: Symptomatic anemia [D64.9]        Precautions:   Fall(femoral access)      ASSESSMENT  Based on the objective data described below, the patient presents with decreased LE strength and AROM, impaired balance, decreased endurance, and limited functional mobility on day 2 of admission with symptomatic anemia and s/p fall at home. He offers good participation with PT though quickly fatigues with limited ambulation and brief ADLs. Pt able to ambulate ~20' before demonstrating decline in gait stability and quality, necessitating increased assistance. VSS with activity and he denies presyncopal symptoms.  Recommend 24-hour assistance for functional mobility at this time and anticipate he would have difficulty ambulating up 7 stairs to enter home given current presentation. Current Level of Function Impacting Discharge (mobility/balance): up to minimal assistance for ambulation +RW, rapid fatigue    Functional Outcome Measure: The patient scored 50 on the Barthel outcome measure which is indicative of 50% functional impairment. Other factors to consider for discharge: requires 24-hour assistance     Patient will benefit from skilled therapy intervention to address the above noted impairments. PLAN :  Recommendations and Planned Interventions: bed mobility training, transfer training, gait training, therapeutic exercises, neuromuscular re-education, modalities, edema management/control, patient and family training/education, and therapeutic activities      Frequency/Duration: Patient will be followed by physical therapy:  5 times a week to address goals. Recommendation for discharge: (in order for the patient to meet his/her long term goals)  Therapy up to 5 days/week in SNF setting vs. Home with 24-hour supervision and intensive HHPT. This discharge recommendation:  A follow-up discussion with the attending provider and/or case management is planned    IF patient discharges home will need the following DME: rolling walker         SUBJECTIVE:   Patient stated I'm a lot weaker now.     Pt received supine, agreeable to PT and cleared by RN.     OBJECTIVE DATA SUMMARY:   HISTORY:    Past Medical History:   Diagnosis Date    BPH (benign prostatic hyperplasia)     Chronic GERD     Chronic kidney disease     Gout     Hypertension     Ill-defined condition     dialysis access to left arm    Meningitis     Renal transplant recipient     S/P TURP 2015    Tobacco use disorder      Past Surgical History:   Procedure Laterality Date    HX CATARACT REMOVAL      HX HEART VALVE SURGERY      HX TURP  2015    TRANSPLANTATION OF KIDNEY Bilateral [id number]    x2       Personal factors and/or comorbidities impacting plan of care: as above    Home Situation  Home Environment: Private residence  # Steps to Enter: 7  Rails to Enter: Yes  Hand Rails : Bilateral  One/Two Story Residence: Two story  # of Interior Steps: 15  Interior Rails: Left  Living Alone: No  Support Systems: Spouse/Significant Other/Partner  Patient Expects to be Discharged to[de-identified] Private residence  Current DME Used/Available at Home: Mauri Leventhal, straight, Shower chair  Tub or Shower Type: Tub/Shower combination    EXAMINATION/PRESENTATION/DECISION MAKING:   Critical Behavior:  Neurologic State: Alert, Appropriate for age  Orientation Level: Oriented X4  Cognition: Appropriate decision making, Appropriate for age attention/concentration, Appropriate safety awareness, Follows commands  Safety/Judgement: Awareness of environment, Driving appropriateness, Fall prevention, Insight into deficits  Hearing: Auditory  Auditory Impairment: None  Skin:  LE exposed skin intact  Edema: 1+ LEs  Range Of Motion:  AROM: Generally decreased, functional           PROM: Generally decreased, functional           Strength:    Strength: Generally decreased, functional                    Tone & Sensation:   Tone: Normal              Sensation: Impaired(B hands- CTS)               Coordination:  Coordination: Generally decreased, functional  Vision:   Acuity: Impaired near vision(Blind in L eye)  Corrective Lenses: Reading glasses  Functional Mobility:  Bed Mobility:     Supine to Sit: Additional time;Supervision  Sit to Supine: Additional time;Supervision  Scooting: Additional time;Supervision  Transfers:  Sit to Stand:  Additional time;Contact guard assistance;Minimum assistance(from raised bed)  Stand to Sit: Minimum assistance(cues for safety and techniques)                       Balance:   Sitting: Without support  Sitting - Static: Good (unsupported)  Sitting - Dynamic: Good (unsupported)  Standing: With support  Standing - Static: (good initially, declines to fair)  Standing - Dynamic : Fair  Ambulation/Gait Training:  Distance (ft): (15' x 2)  Assistive Device: Walker, rolling;Gait belt  Ambulation - Level of Assistance: Minimal assistance        Gait Abnormalities: Decreased step clearance; Antalgic        Base of Support: Widened     Speed/Jess: Pace decreased (<100 feet/min)                     Increasing trunk and knee flexion with ambulation, increased lateral sway, cues for safe RW use. Functional Measure:  Barthel Index:    Bathin  Bladder: 10  Bowels: 10  Groomin  Dressin  Feeding: 10  Mobility: 0  Stairs: 0  Toilet Use: 5  Transfer (Bed to Chair and Back): 5  Total: 50/100       The Barthel ADL Index: Guidelines  1. The index should be used as a record of what a patient does, not as a record of what a patient could do. 2. The main aim is to establish degree of independence from any help, physical or verbal, however minor and for whatever reason. 3. The need for supervision renders the patient not independent. 4. A patient's performance should be established using the best available evidence. Asking the patient, friends/relatives and nurses are the usual sources, but direct observation and common sense are also important. However direct testing is not needed. 5. Usually the patient's performance over the preceding 24-48 hours is important, but occasionally longer periods will be relevant. 6. Middle categories imply that the patient supplies over 50 per cent of the effort. 7. Use of aids to be independent is allowed. Vinay Knight., BarthelDEBBIE. (9684). Functional evaluation: the Barthel Index. [facility] (14)2. ROHINI Chaudhari, Skip Goss., Ryan Alvarez. [place], [address] [place] [address] Ave (). Measuring the change indisability after inpatient rehabilitation; comparison of the responsiveness of the Barthel Index and Functional [place] Measure. Journal of Neurology, Neurosurgery, and Psychiatry, 66(4), 599-855.   CASEY Gomez, Polly Villarreal, NANCY, & Lord Johnny M.A. (2004.) Assessment of post-stroke quality of life in cost-effectiveness studies: The usefulness of the Barthel Index and the EuroQoL-5D. Quality of Life Research, 15, 248-67            Physical Therapy Evaluation Charge Determination   History Examination Presentation Decision-Making   HIGH Complexity :3+ comorbidities / personal factors will impact the outcome/ POC  HIGH Complexity : 4+ Standardized tests and measures addressing body structure, function, activity limitation and / or participation in recreation  MEDIUM Complexity : Evolving with changing characteristics  Other outcome measures barthel  MEDIUM      Based on the above components, the patient evaluation is determined to be of the following complexity level: MEDIUM    Pain Rating:  Denies pain    Activity Tolerance:   requires frequent rest breaks  Please refer to the flowsheet for vital signs taken during this treatment. After treatment patient left in no apparent distress:   Supine in bed, Call bell within reach, Bed / chair alarm activated, Caregiver / family present, and Side rails x 3    COMMUNICATION/EDUCATION:   The patients plan of care was discussed with: Occupational therapist and Registered nurse. Fall prevention education was provided and the patient/caregiver indicated understanding., Patient/family have participated as able in goal setting and plan of care. , and Patient/family agree to work toward stated goals and plan of care.     Thank you for this referral.  Helder Pinedo, PT, DPT   Time Calculation: 39 mins

## 2020-03-16 NOTE — PROGRESS NOTES
1600: HGb back at 8.5; central line pulled per order; pressure held for 15 minutes, no drainage, no evidence of hematoma, transparent dressing applied. Will continue to monitor    1625:telemetry to called slight increase in ST elevation in Lead II; patient asymptomatic; EKG done; no significant change noted; Dr. Florencio Espinosa notified.  Also notified Dr. Florencio Espinosa of increased bruising to patient chest. Dr. Florencio Espinosa to assess in AM.

## 2020-03-16 NOTE — PROGRESS NOTES
Problem: Falls - Risk of  Goal: *Absence of Falls  Description: Document Jonathan Reason Fall Risk and appropriate interventions in the flowsheet. Outcome: Progressing Towards Goal  Note: Fall Risk Interventions:  Mobility Interventions: Communicate number of staff needed for ambulation/transfer, OT consult for ADLs, PT Consult for mobility concerns         Medication Interventions: Patient to call before getting OOB, Teach patient to arise slowly    Elimination Interventions: Call light in reach, Patient to call for help with toileting needs    History of Falls Interventions:  Investigate reason for fall, Utilize gait belt for transfer/ambulation         Problem: Patient Education: Go to Patient Education Activity  Goal: Patient/Family Education  Outcome: Progressing Towards Goal     Problem: Patient Education: Go to Patient Education Activity  Goal: Patient/Family Education  Outcome: Progressing Towards Goal     Problem: Pneumonia: Day 3  Goal: Activity/Safety  Outcome: Progressing Towards Goal  Goal: Consults, if ordered  Outcome: Progressing Towards Goal  Goal: Diagnostic Test/Procedures  Outcome: Progressing Towards Goal  Goal: Nutrition/Diet  Outcome: Progressing Towards Goal  Goal: Discharge Planning  Outcome: Progressing Towards Goal  Goal: Medications  Outcome: Progressing Towards Goal  Goal: Respiratory  Outcome: Progressing Towards Goal  Goal: Treatments/Interventions/Procedures  Outcome: Progressing Towards Goal  Goal: Psychosocial  Outcome: Progressing Towards Goal  Goal: *Oxygen saturation within defined limits  Outcome: Progressing Towards Goal  Goal: *Hemodynamically stable  Outcome: Progressing Towards Goal  Goal: *Demonstrates progressive activity  Outcome: Progressing Towards Goal  Goal: *Tolerating diet  Outcome: Progressing Towards Goal  Goal: *Describes available resources and support systems  Outcome: Progressing Towards Goal  Goal: *Optimal pain control at patient's stated goal  Outcome: Progressing Towards Goal     Problem: Arrhythmia Pathway (Adult)  Goal: *Absence of arrhythmia  Outcome: Progressing Towards Goal     Problem: Patient Education: Go to Patient Education Activity  Goal: Patient/Family Education  Outcome: Progressing Towards Goal     Problem: Patient Education: Go to Patient Education Activity  Goal: Patient/Family Education  Outcome: Progressing Towards Goal

## 2020-03-16 NOTE — PROGRESS NOTES
Problem: Self Care Deficits Care Plan (Adult)  Goal: *Acute Goals and Plan of Care (Insert Text)  Description:   FUNCTIONAL STATUS PRIOR TO ADMISSION: Patient was modified independent using a single point cane for functional mobility. Drives. Pt and his wife reported progressive weakness over the past 3 weeks with multiple falls. HOME SUPPORT: The patient lived with wife but did not require assist.    Occupational Therapy Goals  Initiated 3/16/2020  1. Patient will perform grooming standing at sink with supervision/set-up within 7 day(s). 2.  Patient will perform lower body dressing with supervision/set-up within 7 day(s). 3.  Patient will perform toilet transfers with supervision/set-up using best DME for safety within 7 day(s). 4.  Patient will perform all aspects of toileting with supervision/set-up within 7 day(s). 5.  Patient will participate in upper extremity therapeutic exercise/activities with modified independence for 10 minutes within 7 day(s). 6.  Patient will utilize energy conservation techniques during functional activities with verbal and visual cues within 7 day(s). Outcome: Progressing Towards Goal     OCCUPATIONAL THERAPY EVALUATION  Patient: Yadi Molina (55 y.o. male)  Date: 3/16/2020  Primary Diagnosis: Symptomatic anemia [D64.9]        Precautions:  Fall(femoral access)    ASSESSMENT  Based on the objective data described below, the patient presents with decreased strength, endurance, mobility, balance and safety following admission for symptomatic anemia with hgb currently 6.9. He and his wife describe a progressive physical decline over the past 3 weeks with multiple falls. Pt currently requires physical assist and if discharged in the next day would require rehab. Current Level of Function Impacting Discharge (ADLs/self-care): CGA and set-up for LE ADLs, toileting and functional mobility    Functional Outcome Measure:   The patient scored Total: 50/100 on the Barthel Index outcome measure. Other factors to consider for discharge: see above     Patient will benefit from skilled therapy intervention to address the above noted impairments. PLAN :  Recommendations and Planned Interventions: self care training, functional mobility training, therapeutic exercise, balance training, therapeutic activities, endurance activities, patient education, home safety training, and family training/education    Frequency/Duration: Patient will be followed by occupational therapy 5 times a week to address goals. Recommendation for discharge: (in order for the patient to meet his/her long term goals)  Therapy up to 5 days/week in SNF setting or an intensive home health therapy program    This discharge recommendation:  Has not yet been discussed the attending provider and/or case management    IF patient discharges home will need the following DME: TBD       SUBJECTIVE:   Patient stated I feel ok.     OBJECTIVE DATA SUMMARY:   HISTORY:   Past Medical History:   Diagnosis Date    BPH (benign prostatic hyperplasia)     Chronic GERD     Chronic kidney disease     Gout     Hypertension     Ill-defined condition     dialysis access to left arm    Meningitis     Renal transplant recipient     S/P TURP 2015    Tobacco use disorder      Past Surgical History:   Procedure Laterality Date    HX CATARACT REMOVAL      HX HEART VALVE SURGERY      HX TURP  2015    TRANSPLANTATION OF KIDNEY Bilateral 1971,2005    x2       Expanded or extensive additional review of patient history:     Home Situation  Home Environment: Private residence  # Steps to Enter: 7  Rails to Enter: Yes  Hand Rails : Bilateral  One/Two Story Residence: Two story  # of Interior Steps: 15  Interior Rails: Left  Living Alone: No  Support Systems: Spouse/Significant Other/Partner  Patient Expects to be Discharged to[de-identified] Private residence  Current DME Used/Available at Home: Minta Ruffing, straight, Shower chair  Tub or Shower Type: Tub/Shower combination    Hand dominance: Right    EXAMINATION OF PERFORMANCE DEFICITS:  Cognitive/Behavioral Status:  Neurologic State: Alert; Appropriate for age  Orientation Level: Oriented X4  Cognition: Appropriate decision making; Appropriate for age attention/concentration; Appropriate safety awareness; Follows commands  Perception: Appears intact  Perseveration: No perseveration noted  Safety/Judgement: Awareness of environment;Driving appropriateness; Fall prevention; Insight into deficits      Hearing: Auditory  Auditory Impairment: None    Vision/Perceptual:    Acuity: Impaired near vision(Blind in L eye)    Corrective Lenses: Reading glasses    Range of Motion:  AROM: Generally decreased, functional  PROM: Generally decreased, functional                      Strength:  Strength: Generally decreased, functional                Coordination:  Coordination: Generally decreased, functional  Fine Motor Skills-Upper: Left Impaired;Right Impaired    Gross Motor Skills-Upper: Left Intact; Right Intact    Tone & Sensation:  Tone: Normal  Sensation: Impaired(B hands- CTS)                      Balance:  Sitting: Without support  Sitting - Static: Good (unsupported)  Sitting - Dynamic: Good (unsupported)  Standing: With support  Standing - Static: (good initially, declines to fair)  Standing - Dynamic : Fair    Functional Mobility and Transfers for ADLs:  Bed Mobility:  Supine to Sit: Additional time;Supervision  Sit to Supine: Additional time;Supervision  Scooting: Additional time;Supervision    Transfers:  Sit to Stand: Additional time;Contact guard assistance;Minimum assistance(from raised bed)  Stand to Sit: Minimum assistance(cues for safety and techniques)  Bathroom Mobility: Minimum assistance(RW)  Toilet Transfer : Minimum assistance(RW and grab bar)  Assistive Device : Walker, rolling    ADL Assessment:  Feeding: Modified independent    Oral Facial Hygiene/Grooming: Contact guard assistance; Additional time(standing at sink to brush teeth)    Bathing: Contact guard assistance; Additional time;Assist x1(seated)    Upper Body Dressing: Supervision;Setup    Lower Body Dressing: Contact guard assistance; Additional time;Assist x1(seated using crossed leg technique)    Toileting: Contact guard assistance; Additional time                ADL Intervention and task modifications:  Patient was educated on the benefits of maintaining activity tolerance, functional mobility, and independence with self care tasks during acute stay. Encouraged patient to be out of bed for all meals, perform daily ADLs (as approved by RN/MD regarding bathing etc), performing functional mobility to/from bathroom, and increasing time OOB daily with assist. Patient educated about the importance of maintaining activity tolerance to ensure safe return home and to baseline. Patient verbalized understanding of education. Cognitive Retraining  Safety/Judgement: Awareness of environment;Driving appropriateness; Fall prevention; Insight into deficits    Functional Measure:  Barthel Index:    Bathin  Bladder: 10  Bowels: 10  Groomin  Dressin  Feeding: 10  Mobility: 0  Stairs: 0  Toilet Use: 5  Transfer (Bed to Chair and Back): 5  Total: 50/100        The Barthel ADL Index: Guidelines  1. The index should be used as a record of what a patient does, not as a record of what a patient could do. 2. The main aim is to establish degree of independence from any help, physical or verbal, however minor and for whatever reason. 3. The need for supervision renders the patient not independent. 4. A patient's performance should be established using the best available evidence. Asking the patient, friends/relatives and nurses are the usual sources, but direct observation and common sense are also important. However direct testing is not needed.   5. Usually the patient's performance over the preceding 24-48 hours is important, but occasionally longer periods will be relevant. 6. Middle categories imply that the patient supplies over 50 per cent of the effort. 7. Use of aids to be independent is allowed. Arabella Morgan., Barthel, DPedro LuisW. (1247). Functional evaluation: the Barthel Index. [facility] (14)2. ROHINI Bradshaw, Lexie Aceves., Almaz Velasoc., [place], [facility] (1999). Measuring the change indisability after inpatient rehabilitation; comparison of the responsiveness of the Barthel Index and Functional [place] Measure. Journal of Neurology, Neurosurgery, and Psychiatry, 66(4), 883-242. Sergio Leon, N.J.A, NANCY Velásquez, & Fernando Cuello M.A. (2004.) Assessment of post-stroke quality of life in cost-effectiveness studies: The usefulness of the Barthel Index and the EuroQoL-5D. Quality of Life Research, 15, 175-62         Occupational Therapy Evaluation Charge Determination   History Examination Decision-Making   LOW Complexity : Brief history review  HIGH Complexity : 5 or more performance deficits relating to physical, cognitive , or psychosocial skils that result in activity limitations and / or participation restrictions MEDIUM Complexity : Patient may present with comorbidities that affect occupational performnce. Miniml to moderate modification of tasks or assistance (eg, physical or verbal ) with assesment(s) is necessary to enable patient to complete evaluation       Based on the above components, the patient evaluation is determined to be of the following complexity level: LOW   Pain Rating:  L flank - 6/10    Activity Tolerance:   Fair and requires rest breaks  Please refer to the flowsheet for vital signs taken during this treatment. After treatment patient left in no apparent distress:    Supine in bed, Call bell within reach, and Caregiver / family present    COMMUNICATION/EDUCATION:   The patients plan of care was discussed with: Physical therapist and Registered nurse.      Home safety education was provided and the patient/caregiver indicated understanding., Patient/family have participated as able in goal setting and plan of care. , and Patient/family agree to work toward stated goals and plan of care. This patients plan of care is appropriate for delegation to [facility].     Thank you for this referral.  Alexandra Arnold OT  Time Calculation: 29 mins

## 2020-03-16 NOTE — CDMP QUERY
Pt admitted with Right upper lobe pneumonia,  Pt noted to have failed outpatient treatment of Levofloxacin. If possible, please document in the progress notes and discharge summary if you are evaluating and/or treating any of the following: ·         Gram negative pneumonia ·         Gram positive pneumonia ·         MRSA or MSSA pneumonia ·         Bacterial pneumonia ·         Viral pneumonia ·         Aspiration pneumonia ·         Hypostatic pneumonia ·         Other, please specify ·         Clinically unable to determine ·         Unknown The medical record reflects the following: 
Risk Factors: 82 yo M admitted with PNA Clinical Indicators: 3/16 PN states \" Right upper lobe pneumonia: failed out patient Levofloxacin. RVP neg\" Treatment: IV Ceftriaxone and Azithromycin Note: CAP, HAP, and HCAP indicate where the pneumonia was acquired, not a specific type. Thank you for your time [facility] RN/BSN, CCDS Desk:   [phone] Other:  [phone]

## 2020-03-16 NOTE — PROGRESS NOTES
Bedside shift change report given to [facility] (oncoming nurse) by Daphne Lazo (offgoing nurse). Report included the following information SBAR, Kardex, Procedure Summary, Intake/Output, Recent Results and Cardiac Rhythm Sinus Arrythmia.

## 2020-03-16 NOTE — PROGRESS NOTES
Problem: Falls - Risk of  Goal: *Absence of Falls  Description: Document Burney Flow Fall Risk and appropriate interventions in the flowsheet.   Outcome: Progressing Towards Goal  Note: Fall Risk Interventions:  Mobility Interventions: Assess mobility with egress test, Bed/chair exit alarm, OT consult for ADLs, Patient to call before getting OOB, PT Consult for mobility concerns, PT Consult for assist device competence, Strengthening exercises (ROM-active/passive), Utilize walker, cane, or other assistive device         Medication Interventions: Bed/chair exit alarm, Evaluate medications/consider consulting pharmacy, Patient to call before getting OOB, Teach patient to arise slowly    Elimination Interventions: Call light in reach, Patient to call for help with toileting needs, Stay With Me (per policy), Toilet paper/wipes in reach, Toileting schedule/hourly rounds    History of Falls Interventions: Consult care management for discharge planning, Evaluate medications/consider consulting pharmacy, Bed/chair exit alarm, Door open when patient unattended, Investigate reason for fall, Room close to nurse's station, Utilize gait belt for transfer/ambulation, Assess for delayed presentation/identification of injury for 48 hrs (comment for end date), Vital signs minimum Q4HRs X 24 hrs (comment for end date)         Problem: Patient Education: Go to Patient Education Activity  Goal: Patient/Family Education  Outcome: Progressing Towards Goal

## 2020-03-16 NOTE — PROGRESS NOTES
0815: Verbal orders to pull central line after patient receives antibiotics. Call placed to [facility] to determine time of treatment today    0920: Spoke to [facility]. Will be doing dialysis around noon today. OK to give morning meds. 0930: Patient blood pressure 109/96 this morning. Patient to have dialysis at noon . Reports low blood pressures and unfinished HD sessions due to pressure. Notified Evelyn Alfred NP. Orders to hold diltiazem until dialsysis complete. 1110: Spoke to Dr. Jose Miguel Garcia about patient eye and last Hbg. Will be in to see the patient. Orders to redraw H&H now. 1230: Dr. Amber Donnelly on the floor. Orders to pull central line. 1340: Blood scanned. Given by dialysis RN at the bedside. 1430: Dr. Jose Miguel Garcia on the floor. Orders to recheck H&H at 1530. Can pull line after results. Patient OK to have no access. 1600: Central line pulled. Patient tolerated.

## 2020-03-16 NOTE — PROCEDURES
[facility] [place]  [phone]    Vitals   Pre   Post   Assessment   Pre   Post     Temp  Temp: 98.3 °F (36.8 °C) (03/16/20 1249)  98.2 LOC  A&OX4 A&OX4   HR   Pulse (Heart Rate): 87 (03/16/20 1309) 77 Lungs   CTA  CTA   B/P   BP: 115/74 (03/16/20 1309) 109/70 Cardiac   HRR  HRR   Resp   Resp Rate: 18 (03/16/20 1309) 20 Skin   W/D bruising  W/D bruising   Pain level  Pain Intensity 1: 0 (03/16/20 1202) 0 Edema    trace   none   Orders:    Duration:   Start:    13:09 End:    16:11 Total:   3 hr   Dialyzer:   Dialyzer/Set Up Inspection: Charanjit Abraham (03/16/20 1309)   K Bath:   Dialysate K (mEq/L): 3 (03/16/20 1309)   Ca Bath:   Dialysate CA (mEq/L): 2.5 (03/16/20 1309)   Na/Bicarb:   Dialysate NA (mEq/L): 138 (03/16/20 1309)   Target Fluid Removal:   Goal/Amount of Fluid to Remove (mL): 2000 mL (03/16/20 1309)   Access     Type & Location:   Left arm graft no signs/symptoms of infection   Labs     Obtained/Reviewed   Critical Results Called   Date when labs were drawn-  Hgb-    HGB   Date Value Ref Range Status   03/16/2020 6.9 (L) 12.1 - 17.0 g/dL Final     K-    Potassium   Date Value Ref Range Status   03/15/2020 3.5 3.5 - 5.1 mmol/L Final     Ca-   Calcium   Date Value Ref Range Status   03/15/2020 8.0 (L) 8.5 - 10.1 MG/DL Final     Bun-   BUN   Date Value Ref Range Status   03/15/2020 29 (H) 6 - 20 MG/DL Final     Creat-   Creatinine   Date Value Ref Range Status   03/15/2020 7.43 (H) 0.70 - 1.30 MG/DL Final     Comment:     INVESTIGATED PER DELTA CHECK PROTOCOL        Medications/ Blood Products Given     Name   Dose   Route and Time     Epogen 15,000 units  IV 15:30   Blood 1 unit         Blood Volume Processed (BVP):    72.9 Net Fluid   Removed:  2000   Comments   Time Out Done: yes 13:05  Primary Nurse Rpt Pre: Vitor Palmer RN  Primary Nurse Rpt Post: Vitor Palmer RN  Pt Education: access care, procedural  Care Plan: transfuse one unit of blood  Tx Summary: came to patients room, time out and assessment performed and documented. Left upper arm AV graft without signs and symptoms of infection. Cleaned with alcohol per protocol and accessed with two 15 gauge needles without difficulty. Treatment started at 13:09  13:45 1 unit of blood given. 15:25 Epogen given  16:11 Treatment ended, all possible blood returned to patient. Needles removed and pressure held until hemostasis achieved. The venous bleed for 20 minutes. Admiting Diagnosis: Anemia/weakness  Pt's previous clinic- [facility]  Consent signed - Informed Consent Verified: Yes (03/16/20 1309)  [facility] Consent - yes  Hepatitis Status- negative/immune 9/23/19  Machine #- Machine Number: [id number] (03/16/20 1309)  Telemetry status-bedside  Pre-dialysis wt. -

## 2020-03-17 ENCOUNTER — HOME HEALTH ADMISSION (OUTPATIENT)
Dept: HOME HEALTH SERVICES | Facility: HOME HEALTH | Age: 70
End: 2020-03-17
Payer: MEDICARE

## 2020-03-17 VITALS
DIASTOLIC BLOOD PRESSURE: 77 MMHG | HEIGHT: 70 IN | SYSTOLIC BLOOD PRESSURE: 110 MMHG | HEART RATE: 78 BPM | OXYGEN SATURATION: 99 % | BODY MASS INDEX: 26.69 KG/M2 | RESPIRATION RATE: 18 BRPM | TEMPERATURE: 98 F | WEIGHT: 186.4 LBS

## 2020-03-17 LAB
ABO + RH BLD: NORMAL
ATRIAL RATE: 91 BPM
BLD PROD TYP BPU: NORMAL
BLD PROD TYP BPU: NORMAL
BLOOD GROUP ANTIBODIES SERPL: NORMAL
BPU ID: NORMAL
BPU ID: NORMAL
CALCULATED P AXIS, ECG09: 62 DEGREES
CALCULATED R AXIS, ECG10: -72 DEGREES
CALCULATED T AXIS, ECG11: 71 DEGREES
CROSSMATCH RESULT,%XM: NORMAL
CROSSMATCH RESULT,%XM: NORMAL
DIAGNOSIS, 93000: NORMAL
P-R INTERVAL, ECG05: 158 MS
Q-T INTERVAL, ECG07: 434 MS
QRS DURATION, ECG06: 138 MS
QTC CALCULATION (BEZET), ECG08: 533 MS
SPECIMEN EXP DATE BLD: NORMAL
STATUS OF UNIT,%ST: NORMAL
STATUS OF UNIT,%ST: NORMAL
UNIT DIVISION, %UDIV: 0
UNIT DIVISION, %UDIV: 0
VENTRICULAR RATE, ECG03: 91 BPM

## 2020-03-17 PROCEDURE — 74011250636 HC RX REV CODE- 250/636: Performed by: INTERNAL MEDICINE

## 2020-03-17 PROCEDURE — 74011250637 HC RX REV CODE- 250/637: Performed by: NURSE PRACTITIONER

## 2020-03-17 PROCEDURE — 74011250637 HC RX REV CODE- 250/637: Performed by: INTERNAL MEDICINE

## 2020-03-17 PROCEDURE — 74011636637 HC RX REV CODE- 636/637: Performed by: INTERNAL MEDICINE

## 2020-03-17 RX ORDER — AZITHROMYCIN 250 MG/1
500 TABLET, FILM COATED ORAL ONCE
Status: COMPLETED | OUTPATIENT
Start: 2020-03-17 | End: 2020-03-17

## 2020-03-17 RX ORDER — CEFDINIR 300 MG/1
300 CAPSULE ORAL
Qty: 3 CAP | Refills: 0 | Status: SHIPPED | OUTPATIENT
Start: 2020-03-19 | End: 2020-03-24

## 2020-03-17 RX ORDER — CEFDINIR 300 MG/1
300 CAPSULE ORAL
Status: DISCONTINUED | OUTPATIENT
Start: 2020-03-17 | End: 2020-03-17 | Stop reason: HOSPADM

## 2020-03-17 RX ORDER — CEFDINIR 300 MG/1
300 CAPSULE ORAL
Qty: 3 CAP | Refills: 0 | Status: SHIPPED | OUTPATIENT
Start: 2020-03-19 | End: 2020-03-17 | Stop reason: SDUPTHER

## 2020-03-17 RX ADMIN — MEGESTROL ACETATE 40 MG: 40 TABLET ORAL at 08:10

## 2020-03-17 RX ADMIN — PREDNISONE 10 MG: 5 TABLET ORAL at 08:10

## 2020-03-17 RX ADMIN — CEFDINIR 300 MG: 300 CAPSULE ORAL at 08:16

## 2020-03-17 RX ADMIN — METOPROLOL TARTRATE 25 MG: 25 TABLET, FILM COATED ORAL at 08:10

## 2020-03-17 RX ADMIN — PANTOPRAZOLE SODIUM 40 MG: 40 TABLET, DELAYED RELEASE ORAL at 06:20

## 2020-03-17 RX ADMIN — MIDODRINE HYDROCHLORIDE 15 MG: 5 TABLET ORAL at 08:10

## 2020-03-17 RX ADMIN — AZITHROMYCIN MONOHYDRATE 500 MG: 250 TABLET ORAL at 08:10

## 2020-03-17 NOTE — PROGRESS NOTES
Pharmacist Discharge Medication Reconciliation    Discharge Provider:  Roque Chu MD         Discharge Medications:      My Medications        START taking these medications        Instructions Each Dose to Equal Morning Noon Evening Bedtime   cefdinir 300 mg capsule  Commonly known as:  OMNICEF  Start taking on:  March 19, 2020  Your next dose is:  Thursday 3/19 in the morning  Notes to patient:  Antibiotic to treat pneumonia       Take 1 Cap by mouth every fourty-eight (48) hours for 3 doses. 300 mg         metoprolol tartrate 25 mg tablet  Commonly known as:  LOPRESSOR  Your next dose is: This evening  Notes to patient:  BP and heart medication. Lowers BP and heart rate and helps control heart rhythm. Called a Beta Blocker. Take 1 Tab by mouth every twelve (12) hours for 30 days. 25 mg                CONTINUE taking these medications        Instructions Each Dose to Equal Morning Noon Evening Bedtime   allopurinoL 100 mg tablet  Commonly known as:  ZYLOPRIM  Your last dose was:  Not given while in the hospital   Your next dose is:  Resume home schedule       Take 100 mg by mouth daily. 100 mg         Dialyvite 100-1 mg Tab tablet  Generic drug:  b complex-vitamin c-folic acid  Your last dose was:  Not given while in the hospital   Your next dose is:  Resume home schedule      Take 1 Tab by mouth daily. 1 Tab         Flonase 50 mcg/actuation nasal spray  Generic drug:  fluticasone propionate  Your last dose was:  Not given while in the hospital   Your next dose is:  Resume as needed      2 Sprays by Both Nostrils route daily as needed for Rhinitis or Allergies. 2 Spray         megestroL 40 mg tablet  Commonly known as:  MEGACE  Your next dose is: This evening      Take 40 mg by mouth two (2) times a day. 40 mg         midodrine 5 mg tablet  Commonly known as:  PROAMITINE  Your next dose is: This evening      Take 15 mg by mouth three (3) times daily.    15 mg         predniSONE 10 mg tablet  Commonly known as:  [employer] next dose is:  Tomorrow morning      Take 10 mg by mouth daily. 10 mg         Protonix 40 mg tablet  Generic drug:  pantoprazole  Your next dose is: This evening      Take 40 mg by mouth daily. 40 mg         sildenafil citrate 100 mg tablet  Commonly known as:  Viagra  Your last dose was:  Not given while in the hospital   Your next dose is:  Resume as needed      Take 1 Tab by mouth as needed. 100 mg         testosterone cypionate 200 mg/mL injection  Commonly known as:  DEPOTESTOTERONE CYPIONATE  Your last dose was:  Not given while in the hospital       200 mg by IntraMUSCular route every fourteen (14) days. 200 mg         timolol 0.5 % ophthalmic solution  Commonly known as:  TIMOPTIC  Your last dose was:  Not given while in the hospital   Your next dose is:  Resume home schedule       Administer 1 Drop to left eye daily. 1 Drop                STOP taking these medications      apixaban 5 mg tablet  Commonly known as:  ELIQUIS  Notes to patient:  Stopped because of severe anemia                   Where to Get Your Medications        Information on where to get these meds will be given to you by the nurse or doctor.     Ask your nurse or doctor about these medications  cefdinir 300 mg capsule  metoprolol tartrate 25 mg tablet            The patient's chart, MAR, and AVS were reviewed by   NACHO Yu,   Contact: [phone]

## 2020-03-17 NOTE — PROGRESS NOTES
3/17/2020  11:44 AM  Medicare pt has received, reviewed, and signed 2nd IM letter informing them of their right to appeal the discharge. Signed copied has been placed on pt bedside chart.   Rebecca Arnold

## 2020-03-17 NOTE — DISCHARGE SUMMARY
Lambert Delgadillo [facility] 79  [address]  Tel: [phone]    Physician Discharge Summary    Patient ID:    Gilles Ramos  Age:              81 y.o.    : 1950  MRN:             [id number]     PCP: Donald Palmer MD     Date of Admission: 3/14/2020    Date of Discharge:  3/17/2020    Principal admission Diagnosis:   Symptomatic anemia [D64.9]    Discharge Diagnoses:  Principal Problem:    Right upper lobe pneumonia ([address] (3/14/2020)    Renal transplant recipient (2018)    GERD (gastroesophageal reflux disease) (2018)    Hematoma of left thigh (3/14/2020)    Acute blood loss anemia (3/14/2020)    ESRD on hemodialysis ([address] (3/14/2020)    Aortic valvular disorder (3/14/2020)    PAF (paroxysmal atrial fibrillation) ([address] (3/15/2020)    Consults: Cardiology and Nephrology    Hospital Course:     Mr. Jack Corea is a 81 y.o. admitted to [facility] and treated for the following:    Right upper lobe pneumonia ([address] (3/14/2020): failed out patient Levofloxacin. Blood cultures were neg,  RVP neg. Clinically unable to determine etiology but overall better. Has responded well to IV Ceftriaxone and Azithromycin while here and will continue with renally dosed Omnicef to complete course.      PAF (paroxysmal atrial fibrillation) ([address] (3/15/2020) / Aortic valvular disorder (3/14/2020): sees Dr Eileen Hayes. Rate better controlled. BPs better. Seen by cardiology. Continue low dose Metoprolol. Eliquis due to anemia, recent thigh suspected hematoma and positive FOBT. Out patient follow up by Dr Eileen Hayes.       Acute blood loss anemia (3/14/2020): unclear if solely due to CKD vs suspected hematoma. FOBT +. Had been on Eliquis likely due to A fib and currently on hold. Transfused 1 unit pRBCs with HD and follow up Hgb better. Out patient follow up for further testing once he has recuperated from current illness.     ESRD on hemodialysis ([address] (3/14/2020) / Renal transplant recipient (6/21/2018): on HD on [facility]. Seen by nephrology while here. He will continue with HD as scheduled as well as prednisone     GERD (gastroesophageal reflux disease) (6/21/2018): Continue PPi     ?Hematoma of left thigh (3/14/2020): post fall. This has been ruled out with a neg CT lower extremity.     Right eye ecchymosis POA: no other focal symptoms. No visual changes. Improving. Eliquis has been discontinued. Out patient ophthalmology follow up    Discharge Exam:    Visit Vitals  /61 (BP 1 Location: Right arm)   Pulse 78   Temp 98.3 °F (36.8 °C)   Resp 19   Ht 5' 10\" (1.778 m)   Wt 90.2 kg (198 lb 12.8 oz)   SpO2 97%   BMI 28.52 kg/m²        Patient has been seen and examined. General: well looking and stable patient in no acute distress  Pulm: clear breath sounds without wheezes  Card: no murmurs, normal S1, S2 without thrills, bruits   Abd:    soft, non-tender, normoactive bowel sounds  Skin: no rashes or ulcers, skin turgor is good  Neuro: awake, alert and has a non focal     Activity: Activity as tolerated    Diet: Cardiac Diet and Renal Diet    Current Discharge Medication List      START taking these medications    Details   cefdinir (OMNICEF) 300 mg capsule Take 1 Cap by mouth every fourty-eight (48) hours for 3 doses. Qty: 3 Cap, Refills: 0      metoprolol tartrate (LOPRESSOR) 25 mg tablet Take 1 Tab by mouth every twelve (12) hours for 30 days. Qty: 60 Tab, Refills: 0         CONTINUE these medications which have NOT CHANGED    Details   midodrine (PROAMITINE) 5 mg tablet Take 15 mg by mouth three (3) times daily. predniSONE (DELTASONE) 10 mg tablet Take 10 mg by mouth daily. b complex-vitamin c-folic acid (DIALYVITE) 100-1 mg tab tablet Take 1 Tab by mouth daily. sildenafil citrate (VIAGRA) 100 mg tablet Take 1 Tab by mouth as needed.   Qty: 10 Tab, Refills: 0    Associated Diagnoses: Erectile dysfunction following urethral surgery allopurinol (ZYLOPRIM) 100 mg tablet Take 100 mg by mouth daily. megestrol (MEGACE) 40 mg tablet Take 40 mg by mouth two (2) times a day. timolol (TIMOPTIC) 0.5 % ophthalmic solution Administer 1 Drop to left eye daily. testosterone cypionate (DEPOTESTOTERONE CYPIONATE) 200 mg/mL injection 200 mg by IntraMUSCular route every fourteen (14) days. pantoprazole (PROTONIX) 40 mg tablet Take 40 mg by mouth daily. fluticasone (FLONASE) 50 mcg/actuation nasal spray 2 Sprays by Both Nostrils route daily as needed for Rhinitis or Allergies. STOP taking these medications       apixaban (ELIQUIS) 5 mg tablet Comments:   Reason for Stopping: Follow-up Information     Follow up With Specialties Details Why Contact Info    Sky Gonzalez MD Cardiology Call in 1 week  [phone] [facility]      Katia Fulton MD Family Practice Call For follow up and review [address] [place] [address]  [phone]            Follow-up tests or labs: None    Discharge Condition: Stable    Disposition: home    Time taken to arrange discharge:  35 minutes.     Signed:  Mara Victor MD     [facility]  3/17/2020   8:00 AM

## 2020-03-17 NOTE — PROGRESS NOTES
3/17/2020  10:53 AM  Reason for Admission:   Symptomatic Anemia, PNA, AFib  Pt is a 80 yo AAM admitted to [address] [place] [address] for symptomatic anemia, pt has ESRD on HD at [facility]      PMHx: Ghazala Enrique Transplant recipient             RUR Score:          18%           Plan for utilizing home health: [employer]  at d/c for SN, PT, OT    PCP: First and Last name:  Barbara Munoz MD   Name of Practice:    Are you a current patient: Yes/No: Yes   Approximate date of last visit:  10 days ago                    Current Advanced Directive/Advance Care Plan: pt does not have ACP, wife Anshul Scales [phone] is NOK                         Transition of Care Plan:           Re-admission Risk 18%         1. Hospital admission for medical management, renal, cardiology consults, PT/OT evals  2. CM to follow  3. ACP planning  4. D/C when stable to home w/ family assistance  5. [employer] HH at d/c SN,PT,OT  6. Outpatient f/u PC  7. Resume HD [facility] 6:00 AM, CM sent flow sheets. 8. [facility] resources added to AVS  9.  Family will transport    Care Management Interventions  PCP Verified by CM: Michelle Galarza MD, last visit 10 days ago)  Palliative Care Criteria Met (RRAT>21 & CHF Dx)?: No  Mode of Transport at Discharge: Self(Family)  Transition of Care Consult (CM Consult): [address]: Yes  Discharge Durable Medical Equipment: Yes(RW)  Physical Therapy Consult: Yes  Occupational Therapy Consult: Yes  Speech Therapy Consult: No  Current Support Network: Lives with Spouse, Own Home(pt lives w/ wifein pvt residence, reports to be ambulatory w/ cane, iADLs, drives or wife can assist.)  Confirm Follow Up Transport: Family  The Plan for Transition of Care is Related to the Following Treatment Goals : Home Health  The Patient and/or Patient Representative was Provided with a Choice of Provider and Agrees with the Discharge Plan?: Yes  Name of the Patient Representative Who was Provided with a Choice of Provider and Agrees with the Discharge Plan: Velma Cardenas  Freedom of Choice List was Provided with Basic Dialogue that Supports the Patient's Individualized Plan of Care/Goals, Treatment Preferences and Shares the Quality Data Associated with the Providers?: (S) Yes  Discharge Location  Discharge Placement: Home with home health      CM met w/ pt for d/c planning, charted demographics verified, pt lives w/ wife in 2 story  Home, there are 7 entry steps and 13 interior steps to bed/bath. At baseline pt reports to be ambualtory w/ tri-cane, iADLs, drives but has not driven revently, wife can assist.  DME: tri-cane  Rx: [facility], fills at [employer]    Pt agreeable to [facility] referral sent in CC, [facility] sent in [employer].   Esperanza neri Manager

## 2020-03-17 NOTE — PROGRESS NOTES
Bedside and Verbal shift change report given to Tom Bah (oncoming nurse) by Alec Patel (offgoing nurse). Report included the following information SBAR, Kardex, Intake/Output, MAR and Recent Results. 0730 Spoke with Dr. Carl Brewer MD would like pt to see PT in case home health is needed and then possible DC today    1030 Spoke with Kvng Eagn with case tayler.  Pt HH has been set up and will need a walker before DC

## 2020-03-17 NOTE — DISCHARGE INSTRUCTIONS
HOSPITALIST DISCHARGE INSTRUCTIONS    NAME: Sarah Giraldo   :  1950   MRN:  [id number]     Date:     3/17/2020    ADMIT DATE: 3/14/2020     DISCHARGE DATE: 3/17/2020     PRINCIPAL ADMITTING DIAGNOSIS:  Symptomatic anemia [D64.9]    DISCHARGE DIAGNOSES:  Principal Problem:    Right upper lobe pneumonia ([facility] (3/14/2020)    Renal transplant recipient (2018)    GERD (gastroesophageal reflux disease) (2018)    Hematoma of left thigh (3/14/2020)    Acute blood loss anemia (3/14/2020)    ESRD on hemodialysis ([facility] [address] (3/14/2020)    Aortic valvular disorder (3/14/2020)    PAF (paroxysmal atrial fibrillation) ([address] (3/15/2020)    MEDICATIONS:    · It is important that medications are taken exactly as they are prescribed on the discharge medication instructions and keep them your  in the bottles provided by the pharmacist.   · Keep a list of the medication names, dosages, and times to be taken at all times. · Do not take other medications without consulting your doctor. Recommended diet:  Renal Diet    Recommended activity: Activity as tolerated    Post discharge care:    Notify follow up health care provider or return to the emergency department if you cannot get hold of your doctor if you feel worse or experience symptoms similar to those that brought you to hospital    Follow-up Information     Follow up With Specialties Details Why Contact Info    Guy Pérez MD Cardiology Call in 1 week  [phone] Charis Shaffer MD Family Practice Call For follow up and review [address] [place] [address]  [phone]            Information obtained by :  I understand that if any problems occur once I am at home I am to contact my physician and I understand and acknowledge receipt of the instructions indicated above. Physician's or R.N.'s Signature                                                                  Date/Time                                                                                                                                              Patient or Representative Signature                                                          Date/Time

## 2020-03-17 NOTE — PROGRESS NOTES
Shift Change: 
 
Bedside and Verbal shift change report given to Yahir Galan RN (oncoming nurse) by Carola Duverney, RN and Kimberly Coleman RN(offgoing nurses). Report included the following information SBAR, Kardex, and Quality Measures. Shift Summary: 
 
Pt c/o numbness in legs after sitting in bathroom for a long time. However successful with BM. Assisted back to bed with walker and physical support. Metoprolol held BP less than 100 per order. (77/01) (89/54) Midodrine given. BP increased to  100/81 End of Shift Report: 
 
Bedside and Verbal shift change report given to RN (oncoming nurse) by Yahir Galan RN (offgoing nurse). Report to include information SBAR, Kardex, and Quality Measures.

## 2020-03-17 NOTE — PROGRESS NOTES
Problem: Falls - Risk of  Goal: *Absence of Falls  Description: Document David Ruiz Fall Risk and appropriate interventions in the flowsheet.   Outcome: Progressing Towards Goal  Note: Fall Risk Interventions:  Mobility Interventions: Utilize walker, cane, or other assistive device, Patient to call before getting OOB, PT Consult for mobility concerns         Medication Interventions: Bed/chair exit alarm, Patient to call before getting OOB, Teach patient to arise slowly, Utilize gait belt for transfers/ambulation    Elimination Interventions: Call light in reach, Bed/chair exit alarm, Patient to call for help with toileting needs, Stay With Me (per policy)    History of Falls Interventions: Bed/chair exit alarm, Utilize gait belt for transfer/ambulation, Investigate reason for fall, Assess for delayed presentation/identification of injury for 48 hrs (comment for end date)         Problem: Patient Education: Go to Patient Education Activity  Goal: Patient/Family Education  Outcome: Progressing Towards Goal     Problem: Patient Education: Go to Patient Education Activity  Goal: Patient/Family Education  Outcome: Progressing Towards Goal     Problem: Pneumonia: Day 1  Goal: Off Pathway (Use only if patient is Off Pathway)  Outcome: Progressing Towards Goal  Goal: Activity/Safety  Outcome: Progressing Towards Goal  Goal: Consults, if ordered  Outcome: Progressing Towards Goal  Goal: Diagnostic Test/Procedures  Outcome: Progressing Towards Goal  Goal: Nutrition/Diet  Outcome: Progressing Towards Goal  Goal: Medications  Outcome: Progressing Towards Goal  Goal: Respiratory  Outcome: Progressing Towards Goal  Goal: Treatments/Interventions/Procedures  Outcome: Progressing Towards Goal  Goal: Psychosocial  Outcome: Progressing Towards Goal  Goal: *Oxygen saturation within defined limits  Outcome: Progressing Towards Goal  Goal: *Influenza vaccine administered (October-March)  Outcome: Progressing Towards Goal  Goal: *Pneumoccocal vaccine administered  Outcome: Progressing Towards Goal  Goal: *Hemodynamically stable  Outcome: Progressing Towards Goal  Goal: *Demonstrates progressive activity  Outcome: Progressing Towards Goal  Goal: *Tolerating diet  Outcome: Progressing Towards Goal     Problem: Pneumonia: Day 2  Goal: Off Pathway (Use only if patient is Off Pathway)  Outcome: Progressing Towards Goal  Goal: Activity/Safety  Outcome: Progressing Towards Goal  Goal: Consults, if ordered  Outcome: Progressing Towards Goal  Goal: Diagnostic Test/Procedures  Outcome: Progressing Towards Goal  Goal: Nutrition/Diet  Outcome: Progressing Towards Goal  Goal: Discharge Planning  Outcome: Progressing Towards Goal  Goal: Medications  Outcome: Progressing Towards Goal  Goal: Respiratory  Outcome: Progressing Towards Goal  Goal: Treatments/Interventions/Procedures  Outcome: Progressing Towards Goal  Goal: Psychosocial  Outcome: Progressing Towards Goal  Goal: *Oxygen saturation within defined limits  Outcome: Progressing Towards Goal  Goal: *Hemodynamically stable  Outcome: Progressing Towards Goal  Goal: *Demonstrates progressive activity  Outcome: Progressing Towards Goal  Goal: *Tolerating diet  Outcome: Progressing Towards Goal  Goal: *Optimal pain control at patient's stated goal  Outcome: Progressing Towards Goal     Problem: Pneumonia: Day 3  Goal: Off Pathway (Use only if patient is Off Pathway)  Outcome: Progressing Towards Goal  Goal: Activity/Safety  Outcome: Progressing Towards Goal  Goal: Consults, if ordered  Outcome: Progressing Towards Goal  Goal: Diagnostic Test/Procedures  Outcome: Progressing Towards Goal  Goal: Nutrition/Diet  Outcome: Progressing Towards Goal  Goal: Discharge Planning  Outcome: Progressing Towards Goal  Goal: Medications  Outcome: Progressing Towards Goal  Goal: Respiratory  Outcome: Progressing Towards Goal  Goal: Treatments/Interventions/Procedures  Outcome: Progressing Towards Goal  Goal: Psychosocial  Outcome: Progressing Towards Goal  Goal: *Oxygen saturation within defined limits  Outcome: Progressing Towards Goal  Goal: *Hemodynamically stable  Outcome: Progressing Towards Goal  Goal: *Demonstrates progressive activity  Outcome: Progressing Towards Goal  Goal: *Tolerating diet  Outcome: Progressing Towards Goal  Goal: *Describes available resources and support systems  Outcome: Progressing Towards Goal  Goal: *Optimal pain control at patient's stated goal  Outcome: Progressing Towards Goal     Problem: Pneumonia: Day 4  Goal: Off Pathway (Use only if patient is Off Pathway)  Outcome: Progressing Towards Goal  Goal: Activity/Safety  Outcome: Progressing Towards Goal  Goal: Nutrition/Diet  Outcome: Progressing Towards Goal  Goal: Discharge Planning  Outcome: Progressing Towards Goal  Goal: Medications  Outcome: Progressing Towards Goal  Goal: Respiratory  Outcome: Progressing Towards Goal  Goal: Treatments/Interventions/Procedures  Outcome: Progressing Towards Goal  Goal: Psychosocial  Outcome: Progressing Towards Goal     Problem: Pneumonia: Discharge Outcomes  Goal: *Demonstrates progressive activity  Outcome: Progressing Towards Goal  Goal: *Describes follow-up/return visits to physicians  Outcome: Progressing Towards Goal  Goal: *Tolerating diet  Outcome: Progressing Towards Goal  Goal: *Verbalizes name, dosage, time, side effects, and number of days to continue medications  Outcome: Progressing Towards Goal  Goal: *Influenza immunization  Outcome: Progressing Towards Goal  Goal: *Pneumococcal immunization  Outcome: Progressing Towards Goal  Goal: *Respiratory status at baseline  Outcome: Progressing Towards Goal  Goal: *Vital signs within defined limits  Outcome: Progressing Towards Goal  Goal: *Describes available resources and support systems  Outcome: Progressing Towards Goal  Goal: *Optimal pain control at patient's stated goal  Outcome: Progressing Towards Goal

## 2020-03-17 NOTE — PROGRESS NOTES
PCP FLOR appt scheduled with Dr. Tiffanie Shah on 3/24/2020 at 11:20am Appt added to AVS. DANE White CM Specialist

## 2020-03-17 NOTE — PROGRESS NOTES
In preparation for discharge, I have completed AVS med-updates, Care Plans and Education in [address] [facility]. Educational information on new medications has been added to AVS. CMS has set up PCP appointment. Patient will be evaluated for home health PT and CM will assist if warranted. Will continue to follow. [id number]  Discharge instructions, including information on new medications, were reviewed with patient and his wife. All questions were answered. He signed that he had received a copy of his discharge papers and 2 prescriptions and will be discharged home with his wife. Case Management set up [facility] through [facility].      1142  Patient waiting for Rolling Walker to be delivered to hospital room

## 2020-03-17 NOTE — PROGRESS NOTES
[address]  YOB: 1950          Assessment & Plan:     ESRD: MWF:  HD  Today  - DR. Ashish Canas  HD yesterday  PNA  - ABX  H/O Failed KT  - On steroids for presumptive subclinical adrenal insuff/chrornic rejection       Subjective:   CC:ESRD  HPI: Patient seen   HD yest     ROS:no cp.sob  Current Facility-Administered Medications   Medication Dose Route Frequency    cefdinir (OMNICEF) capsule 300 mg  300 mg Oral Q48H    epoetin medhat-epbx (RETACRIT) injection 15,000 Units  15,000 Units IntraVENous DIALYSIS MON, WED & FRI    metoprolol tartrate (LOPRESSOR) tablet 25 mg  25 mg Oral Q12H    0.9% sodium chloride infusion 250 mL  250 mL IntraVENous PRN    acetaminophen (TYLENOL) tablet 1,000 mg  1,000 mg Oral Q8H PRN    lidocaine 4 % patch 1 Patch  1 Patch TransDERmal Q24H    0.9% sodium chloride infusion 250 mL  250 mL IntraVENous PRN    megestroL (MEGACE) tablet 40 mg  40 mg Oral DAILY    0.9% sodium chloride infusion 250 mL  250 mL IntraVENous PRN    sodium chloride (NS) flush 5-40 mL  5-40 mL IntraVENous Q8H    sodium chloride (NS) flush 5-40 mL  5-40 mL IntraVENous PRN    pantoprazole (PROTONIX) tablet 40 mg  40 mg Oral ACB&D    midodrine (PROAMITINE) tablet 15 mg  15 mg Oral TID    predniSONE (DELTASONE) tablet 10 mg  10 mg Oral DAILY WITH BREAKFAST          Objective:     Vitals:  Blood pressure 111/58, pulse 97, temperature 98.3 °F (36.8 °C), resp. rate 19, height 5' 10\" (1.778 m), weight 84.6 kg (186 lb 6.4 oz), SpO2 97 %. Temp (24hrs), Av.3 °F (36.8 °C), Min:97.9 °F (36.6 °C), Max:98.9 °F (37.2 °C)      Intake and Output:  No intake/output data recorded.   03/15 1901 -  0700  In: 1333 [P.O.:1000]  Out:      Physical Exam:                Patient is intubated:  no    Physical Examination:   GENERAL ASSESSMENT: NAD  HEENT:Nontraumatic   CHEST: CTA  HEART: S1S2  ABDOMEN: Soft,NT,  :Clemens: n  EXTREMITY: EDEMA 1  NEURO:Grossly non focal          ECG/rhythm:    Data Review      No results for input(s): TNIPOC in the last 72 hours. No lab exists for component: ITNL   Recent Labs     03/14/20  1922 03/14/20  1412   TROIQ 0.07* 0.07*     Recent Labs     03/16/20  1518 03/16/20  1119 03/15/20  0537   NA  --   --  135*   K  --   --  3.5   CL  --   --  99   CO2  --   --  28   BUN  --   --  29*   CREA  --   --  7.43*   GLU  --   --  94   CA  --   --  8.0*   ALB  --   --  1.9*   WBC  --   --  11.6*   HGB 8.5* 6.9* 7.1*   HCT 25.5* 22.2* 23.4*   PLT  --   --  128*      No results for input(s): INR, PTP, APTT, INREXT, INREXT in the last 72 hours. Needs: urine analysis, urine sodium, protein and creatinine  No results found for: Melissa Hernandez      Discussed with:  Family    : Amanda Nassar MD  3/17/2020        [facility]:  [email or web address]  Johnna Peralta office:  [address] [place] [address], [address] [place] [address],[address]  [place], [id number] [place] [address]  Phone: [phone]  Fax :     [phone]    [facility] office:  [address] [facility], [address]  Phone - [phone]  Fax - [phone]

## 2020-03-18 ENCOUNTER — PATIENT OUTREACH (OUTPATIENT)
Dept: INTERNAL MEDICINE CLINIC | Age: 70
End: 2020-03-18

## 2020-03-18 NOTE — PROGRESS NOTES
COVID-19 Screening Initial Follow-up Note    Patient contacted regarding COVID-19  risk. Care Transition Nurse/ Ambulatory Care Manager contacted the patient by telephone to perform post discharge assessment. Verified name and  with patient as identifiers. Provided introduction to self, and explanation of the CTN/ACM role, and reason for call due to risk factors for infection and/or exposure to COVID-19. Symptoms reviewed with patient who verbalized the following symptoms:   Fever no    Fatigue no   Pain or aching joints no  Cough no  Shortness of breath no    Confusion or unusual change in mental status no    Chills or shaking no    Sweating no    Fast heart rate no    Fast breathing no    Dizziness/lightheadedness no    Less urine output no    Cold, clammy, and pale skin no  Low body temperature no       Due to onset/worsening of new symptoms, encounter routed to provider for escalation. Patient has following risk factors of: ESRD, pneumonia CTN/ACM reviewed discharge instructions, medical action plan and red flags such as increased shortness of breath, increasing fever and signs of decompensation with patient who verbalized understanding. Discussed exposure protocols and quarantine with CDC Guidelines What to do if you are sick with coronavirus disease 2019 Patient who was given an opportunity for questions and concerns. The patient agrees to contact the [facility] exposure line, local health department and PCP office for questions related to their healthcare. CTN provided contact information for future reference.     Reviewed and educated patient on any new and changed medications related to discharge diagnosis     Plan for follow-up call in 14 days based on severity of symptoms and risk factors    CTN provided patient with the [facility] COVID-19 hotline number, [phone]

## 2020-03-19 ENCOUNTER — HOME CARE VISIT (OUTPATIENT)
Dept: SCHEDULING | Facility: HOME HEALTH | Age: 70
End: 2020-03-19
Payer: MEDICARE

## 2020-03-19 PROCEDURE — 3331090002 HH PPS REVENUE DEBIT

## 2020-03-19 PROCEDURE — 3331090001 HH PPS REVENUE CREDIT

## 2020-03-19 PROCEDURE — 400013 HH SOC

## 2020-03-19 PROCEDURE — G0299 HHS/HOSPICE OF RN EA 15 MIN: HCPCS

## 2020-03-20 ENCOUNTER — HOME CARE VISIT (OUTPATIENT)
Dept: SCHEDULING | Facility: HOME HEALTH | Age: 70
End: 2020-03-20
Payer: MEDICARE

## 2020-03-20 ENCOUNTER — HOME CARE VISIT (OUTPATIENT)
Dept: HOME HEALTH SERVICES | Facility: HOME HEALTH | Age: 70
End: 2020-03-20

## 2020-03-20 LAB
BACTERIA SPEC CULT: NORMAL
BACTERIA SPEC CULT: NORMAL
SERVICE CMNT-IMP: NORMAL
SERVICE CMNT-IMP: NORMAL

## 2020-03-20 PROCEDURE — 3331090002 HH PPS REVENUE DEBIT

## 2020-03-20 PROCEDURE — G0151 HHCP-SERV OF PT,EA 15 MIN: HCPCS

## 2020-03-20 PROCEDURE — 3331090001 HH PPS REVENUE CREDIT

## 2020-03-21 VITALS
DIASTOLIC BLOOD PRESSURE: 50 MMHG | SYSTOLIC BLOOD PRESSURE: 82 MMHG | RESPIRATION RATE: 12 BRPM | HEART RATE: 76 BPM | TEMPERATURE: 97.7 F

## 2020-03-21 PROCEDURE — 3331090002 HH PPS REVENUE DEBIT

## 2020-03-21 PROCEDURE — 3331090001 HH PPS REVENUE CREDIT

## 2020-03-22 PROCEDURE — 3331090001 HH PPS REVENUE CREDIT

## 2020-03-22 PROCEDURE — 3331090002 HH PPS REVENUE DEBIT

## 2020-03-23 PROCEDURE — 3331090001 HH PPS REVENUE CREDIT

## 2020-03-23 PROCEDURE — 3331090002 HH PPS REVENUE DEBIT

## 2020-03-24 ENCOUNTER — HOME CARE VISIT (OUTPATIENT)
Dept: SCHEDULING | Facility: HOME HEALTH | Age: 70
End: 2020-03-24
Payer: MEDICARE

## 2020-03-24 ENCOUNTER — HOME CARE VISIT (OUTPATIENT)
Dept: HOME HEALTH SERVICES | Facility: HOME HEALTH | Age: 70
End: 2020-03-24
Payer: MEDICARE

## 2020-03-24 PROCEDURE — 3331090001 HH PPS REVENUE CREDIT

## 2020-03-24 PROCEDURE — 3331090002 HH PPS REVENUE DEBIT

## 2020-03-25 ENCOUNTER — HOME CARE VISIT (OUTPATIENT)
Dept: HOME HEALTH SERVICES | Facility: HOME HEALTH | Age: 70
End: 2020-03-25
Payer: MEDICARE

## 2020-03-25 PROCEDURE — 3331090001 HH PPS REVENUE CREDIT

## 2020-03-25 PROCEDURE — 3331090002 HH PPS REVENUE DEBIT

## 2020-03-26 PROCEDURE — 3331090002 HH PPS REVENUE DEBIT

## 2020-03-26 PROCEDURE — 3331090001 HH PPS REVENUE CREDIT

## 2020-03-27 PROCEDURE — 3331090001 HH PPS REVENUE CREDIT

## 2020-03-27 PROCEDURE — 3331090002 HH PPS REVENUE DEBIT

## 2020-03-28 PROCEDURE — 3331090002 HH PPS REVENUE DEBIT

## 2020-03-28 PROCEDURE — 3331090001 HH PPS REVENUE CREDIT

## 2020-03-29 PROCEDURE — 3331090001 HH PPS REVENUE CREDIT

## 2020-03-29 PROCEDURE — 3331090002 HH PPS REVENUE DEBIT

## 2020-03-30 PROCEDURE — 3331090001 HH PPS REVENUE CREDIT

## 2020-03-30 PROCEDURE — 3331090002 HH PPS REVENUE DEBIT

## 2020-03-31 PROCEDURE — 3331090002 HH PPS REVENUE DEBIT

## 2020-03-31 PROCEDURE — 3331090001 HH PPS REVENUE CREDIT

## 2020-04-01 ENCOUNTER — HOME CARE VISIT (OUTPATIENT)
Dept: HOME HEALTH SERVICES | Facility: HOME HEALTH | Age: 70
End: 2020-04-01
Payer: MEDICARE

## 2020-04-01 PROCEDURE — 3331090002 HH PPS REVENUE DEBIT

## 2020-04-01 PROCEDURE — 3331090001 HH PPS REVENUE CREDIT

## 2020-04-02 ENCOUNTER — PATIENT OUTREACH (OUTPATIENT)
Dept: INTERNAL MEDICINE CLINIC | Age: 70
End: 2020-04-02

## 2020-04-02 PROCEDURE — 3331090002 HH PPS REVENUE DEBIT

## 2020-04-02 PROCEDURE — 3331090001 HH PPS REVENUE CREDIT

## 2020-04-02 NOTE — PROGRESS NOTES
Patient resolved from Transition of Care episode on 4/2/2020. ACM/CTN was unsuccessful at contacting this patient today. Patient/family was provided the following resources and education related to COVID-19 during the initial call:                         Signs, symptoms and red flags related to COVID-19            CDC exposure and quarantine guidelines            [facility] exposure contact - [phone]            Contact for their local Department of Health                 Patient has not had any additional ED or hospital visits. No further outreach scheduled with this CTN/ACM. Episode of Care resolved. Patient has this CTN/ACM contact information if future needs arise.

## 2020-04-03 VITALS
HEART RATE: 76 BPM | DIASTOLIC BLOOD PRESSURE: 58 MMHG | TEMPERATURE: 97.8 F | RESPIRATION RATE: 14 BRPM | OXYGEN SATURATION: 94 % | SYSTOLIC BLOOD PRESSURE: 90 MMHG

## 2020-04-03 PROCEDURE — 3331090002 HH PPS REVENUE DEBIT

## 2020-04-03 PROCEDURE — 3331090001 HH PPS REVENUE CREDIT

## 2020-04-04 PROCEDURE — 3331090002 HH PPS REVENUE DEBIT

## 2020-04-04 PROCEDURE — 3331090001 HH PPS REVENUE CREDIT

## 2020-04-05 PROCEDURE — 3331090002 HH PPS REVENUE DEBIT

## 2020-04-05 PROCEDURE — 3331090001 HH PPS REVENUE CREDIT

## 2020-04-06 PROCEDURE — 3331090002 HH PPS REVENUE DEBIT

## 2020-04-06 PROCEDURE — 3331090001 HH PPS REVENUE CREDIT

## 2020-04-07 PROCEDURE — 3331090002 HH PPS REVENUE DEBIT

## 2020-04-07 PROCEDURE — 3331090001 HH PPS REVENUE CREDIT

## 2020-04-08 PROCEDURE — 3331090002 HH PPS REVENUE DEBIT

## 2020-04-08 PROCEDURE — 3331090001 HH PPS REVENUE CREDIT

## 2020-04-09 PROCEDURE — 3331090001 HH PPS REVENUE CREDIT

## 2020-04-09 PROCEDURE — 3331090002 HH PPS REVENUE DEBIT

## 2020-04-10 PROCEDURE — 3331090001 HH PPS REVENUE CREDIT

## 2020-04-10 PROCEDURE — 3331090002 HH PPS REVENUE DEBIT

## 2020-04-11 PROCEDURE — 3331090002 HH PPS REVENUE DEBIT

## 2020-04-11 PROCEDURE — 3331090001 HH PPS REVENUE CREDIT

## 2020-04-12 PROCEDURE — 3331090001 HH PPS REVENUE CREDIT

## 2020-04-12 PROCEDURE — 3331090002 HH PPS REVENUE DEBIT

## 2020-04-13 ENCOUNTER — HOME CARE VISIT (OUTPATIENT)
Dept: HOME HEALTH SERVICES | Facility: HOME HEALTH | Age: 70
End: 2020-04-13
Payer: MEDICARE

## 2020-04-13 PROCEDURE — 3331090002 HH PPS REVENUE DEBIT

## 2020-04-13 PROCEDURE — 3331090001 HH PPS REVENUE CREDIT

## 2020-04-14 PROCEDURE — 3331090001 HH PPS REVENUE CREDIT

## 2020-04-14 PROCEDURE — 3331090002 HH PPS REVENUE DEBIT

## 2020-04-15 PROCEDURE — 3331090001 HH PPS REVENUE CREDIT

## 2020-04-15 PROCEDURE — 3331090002 HH PPS REVENUE DEBIT

## 2020-04-16 PROCEDURE — 3331090002 HH PPS REVENUE DEBIT

## 2020-04-16 PROCEDURE — 3331090001 HH PPS REVENUE CREDIT

## 2020-04-17 PROCEDURE — 3331090002 HH PPS REVENUE DEBIT

## 2020-04-17 PROCEDURE — 3331090003 HH PPS REVENUE ADJ

## 2020-04-17 PROCEDURE — 3331090001 HH PPS REVENUE CREDIT

## 2020-05-17 ENCOUNTER — HOME CARE VISIT (OUTPATIENT)
Dept: HOME HEALTH SERVICES | Facility: HOME HEALTH | Age: 70
End: 2020-05-17

## 2020-07-01 DIAGNOSIS — Z99.2 STAGE 5 CHRONIC KIDNEY DISEASE ON CHRONIC DIALYSIS (HCC): ICD-10-CM

## 2020-07-01 DIAGNOSIS — D64.9 CHRONIC ANEMIA: ICD-10-CM

## 2020-07-01 DIAGNOSIS — N18.6 STAGE 5 CHRONIC KIDNEY DISEASE ON CHRONIC DIALYSIS (HCC): ICD-10-CM

## 2020-07-01 DIAGNOSIS — E29.1 TESTICULAR HYPOFUNCTION: ICD-10-CM

## 2020-07-01 DIAGNOSIS — N40.0 BENIGN PROSTATIC HYPERPLASIA WITHOUT LOWER URINARY TRACT SYMPTOMS: ICD-10-CM

## 2020-07-01 DIAGNOSIS — N52.8 OTHER MALE ERECTILE DYSFUNCTION: ICD-10-CM

## 2020-08-26 ENCOUNTER — CLINICAL SUPPORT (OUTPATIENT)
Dept: UROLOGY | Age: 70
End: 2020-08-26
Payer: MEDICARE

## 2020-08-26 VITALS — TEMPERATURE: 97.7 F

## 2020-08-26 DIAGNOSIS — E29.1 TESTICULAR HYPOFUNCTION: Primary | ICD-10-CM

## 2020-08-26 PROCEDURE — 96372 THER/PROPH/DIAG INJ SC/IM: CPT

## 2020-08-26 RX ORDER — TESTOSTERONE CYPIONATE 200 MG/ML
200 INJECTION INTRAMUSCULAR
Status: COMPLETED | OUTPATIENT
Start: 2020-08-26 | End: 2020-08-26

## 2020-08-26 RX ADMIN — TESTOSTERONE CYPIONATE 200 MG: 200 INJECTION INTRAMUSCULAR at 15:44

## 2020-09-17 ENCOUNTER — HOSPITAL ENCOUNTER (OUTPATIENT)
Dept: MRI IMAGING | Age: 70
Discharge: HOME | End: 2020-09-17
Attending: ORTHOPAEDIC SURGERY

## 2020-09-17 DIAGNOSIS — M47.812 SPONDYLOSIS OF CERVICAL SPINE: ICD-10-CM

## 2020-09-17 DIAGNOSIS — M54.12 CERVICAL RADICULITIS: ICD-10-CM

## 2020-09-23 ENCOUNTER — CLINICAL SUPPORT (OUTPATIENT)
Dept: UROLOGY | Age: 70
End: 2020-09-23
Payer: MEDICARE

## 2020-09-23 DIAGNOSIS — E29.1 TESTICULAR HYPOFUNCTION: Primary | ICD-10-CM

## 2020-09-23 PROCEDURE — 96372 THER/PROPH/DIAG INJ SC/IM: CPT | Performed by: UROLOGY

## 2020-09-23 RX ORDER — TESTOSTERONE CYPIONATE 200 MG/ML
200 INJECTION INTRAMUSCULAR
Status: COMPLETED | OUTPATIENT
Start: 2020-09-23 | End: 2020-09-23

## 2020-09-23 RX ADMIN — TESTOSTERONE CYPIONATE 200 MG: 200 INJECTION INTRAMUSCULAR at 14:49

## 2020-10-06 ENCOUNTER — HOSPITAL ENCOUNTER (OUTPATIENT)
Dept: MRI IMAGING | Age: 70
Discharge: HOME | End: 2020-10-06
Attending: ORTHOPAEDIC SURGERY

## 2020-10-07 ENCOUNTER — TRANSCRIBE ORDER (OUTPATIENT)
Dept: SCHEDULING | Age: 70
End: 2020-10-07

## 2020-10-07 DIAGNOSIS — M47.812 SPONDYLOSIS, CERVICAL: Primary | ICD-10-CM

## 2020-10-07 DIAGNOSIS — M54.12 CERVICAL RADICULITIS: ICD-10-CM

## 2020-10-14 ENCOUNTER — OFFICE VISIT (OUTPATIENT)
Dept: UROLOGY | Age: 70
End: 2020-10-14
Payer: MEDICARE

## 2020-10-14 VITALS — TEMPERATURE: 98.6 F | HEIGHT: 70 IN | BODY MASS INDEX: 25.77 KG/M2 | WEIGHT: 180 LBS

## 2020-10-14 DIAGNOSIS — N40.0 BENIGN PROSTATIC HYPERPLASIA WITHOUT LOWER URINARY TRACT SYMPTOMS: ICD-10-CM

## 2020-10-14 DIAGNOSIS — E29.1 TESTICULAR HYPOFUNCTION: Primary | ICD-10-CM

## 2020-10-14 DIAGNOSIS — Z99.2 STAGE 5 CHRONIC KIDNEY DISEASE ON CHRONIC DIALYSIS (HCC): ICD-10-CM

## 2020-10-14 DIAGNOSIS — D64.9 CHRONIC ANEMIA: ICD-10-CM

## 2020-10-14 DIAGNOSIS — N52.9 IMPOTENCE: ICD-10-CM

## 2020-10-14 DIAGNOSIS — N18.6 STAGE 5 CHRONIC KIDNEY DISEASE ON CHRONIC DIALYSIS (HCC): ICD-10-CM

## 2020-10-14 PROCEDURE — G8427 DOCREV CUR MEDS BY ELIG CLIN: HCPCS | Performed by: UROLOGY

## 2020-10-14 PROCEDURE — 96372 THER/PROPH/DIAG INJ SC/IM: CPT | Performed by: UROLOGY

## 2020-10-14 PROCEDURE — 99214 OFFICE O/P EST MOD 30 MIN: CPT | Performed by: UROLOGY

## 2020-10-14 PROCEDURE — G8417 CALC BMI ABV UP PARAM F/U: HCPCS | Performed by: UROLOGY

## 2020-10-14 RX ORDER — TESTOSTERONE CYPIONATE 200 MG/ML
200 INJECTION INTRAMUSCULAR
Status: COMPLETED | OUTPATIENT
Start: 2020-10-14 | End: 2020-10-14

## 2020-10-14 RX ADMIN — TESTOSTERONE CYPIONATE 200 MG: 200 INJECTION INTRAMUSCULAR at 16:17

## 2020-10-14 NOTE — PROGRESS NOTES
HISTORY OF PRESENT ILLNESS  Evon Das is a 78 y.o. male. Chief Complaint   Patient presents with    Follow-up    Benign Prostatic Hypertrophy     He has hypogonadism. He was transitioned to q 3 week dosing for a high testosterone level. It was supposed to be a stas level but he may have had it drawn after a dose. He finds the treatment not effective. He is fatigued on dialysis. He has chronic anemia. 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 Stage 5 chronic kidney disease on chronic dialysis [facility]) ICD-10-CM: N18.6, Z99.2  ICD-9-CM: 585.6, V45.11  7/1/2020    Overview Signed 7/1/2020  3:26 PM by Almita Bergman     He has failed bilateral renal transplants, and is dialysis dependent at this time. He is followed by Dr. Quynh Craig, nephrology. His creatinine on 10/5/18 was 6.37  His creatinine on 12/19/19 was 6.65. Creatinine on 3/31/2020 was 6.41. Creatinineo on 6/10/2020 was 3.84             Testicular hypofunction ICD-10-CM: E29.1  ICD-9-CM: 257.2  7/1/2020    Overview Addendum 10/14/2020  4:14 PM by Jessenia Cates MD     He was last seen by a provider on 7/9/2020 via virtual visit. Started on TRT on 1/14/2020. He felt more energy. He has not seen much improvement in sexual function. He still has no libido. His levels were >1300 at stas. He thinks it may not have been a stas after questioning. He has more fatigue. Dosing changed from q 2 weeks to q 3 weeks on 7/9/2020. He feels it is not effective. We will check labs and go back to q 2wk dosing.                 PAF (paroxysmal atrial fibrillation) (HCC) ICD-10-CM: I48.0  ICD-9-CM: 427.31  3/15/2020        Right upper lobe pneumonia ICD-10-CM: J18.9  ICD-9-CM: 486  3/14/2020        Hematoma of left thigh ICD-10-CM: I64.45WD  ICD-9-CM: 924.00  3/14/2020        Acute blood loss anemia ICD-10-CM: D62  ICD-9-CM: 285.1  3/14/2020        ESRD on hemodialysis ([facility] [address] ICD-10-CM: N18.6, Z99.2  ICD-9-CM: 585.6, V45.11  3/14/2020        Aortic valvular disorder ICD-10-CM: I35.9  ICD-9-CM: 424.1  3/14/2020        Renal transplant recipient ICD-10-CM: Z94.0  ICD-9-CM: V42.0  6/21/2018        Bilateral lower extremity edema ICD-10-CM: R60.0  ICD-9-CM: 782.3  6/21/2018        Impotence ICD-10-CM: N52.9  ICD-9-CM: 607.84  6/21/2018    Overview Addendum 10/12/2020  2:43 PM by Susanna Moore NP     He has a history of impotence, on TRT. He notes no real improvement in sexual function or libido. RX provided for sildenafil on 7/9/2020. GERD (gastroesophageal reflux disease) ICD-10-CM: K21.9  ICD-9-CM: 530.81  6/21/2018        S/P TURP (status post transurethral resection of prostate) ICD-10-CM: Z90.79  ICD-9-CM: V45.89  6/21/2018        Benign prostatic hyperplasia without lower urinary tract symptoms ICD-10-CM: N40.0  ICD-9-CM: 600.00  6/21/2018    Overview Signed 7/1/2020  3:24 PM by Matt Mills     He has a history of BPH. He is s/p TURP on 6/12/13. He had benign prostate specimens. He has ESRD and no longer voids. PSA on 12/19/19 was <0.1. Vision loss of left eye ICD-10-CM: H54.62  ICD-9-CM: 369.8  6/21/2018        Chronic gout of multiple sites due to renal impairment ICD-10-CM: M1A. 39X0  ICD-9-CM: 274.02  6/21/2018               Review of Systems   Constitutional: Positive for malaise/fatigue. All other systems reviewed and are negative. 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 12/19/19 hGb is 12.4 with HcT 36.5 His chronic anemia could be contributory to his fatigue.  H/H ok on June 10, 2020 labs.  Chronic GERD     Chronic kidney disease     Erectile dysfunction     Gout     Hypertension     Ill-defined condition     dialysis access to left arm    Inflammatory bowel disease     Meningitis     Renal transplant recipient     S/P TURP 2015    Stage 5 chronic kidney disease on chronic dialysis ([facility] [address] 7/1/2020    He has failed bilateral renal transplants, and is dialysis dependent at this time. He is followed by Dr. Susanna Gross, nephrology. His creatinine on 10/5/18 was 6.37 His creatinine on 12/19/19 was 6.65. Creatinine on 3/31/2020 was 6.41. Creatinineo on 6/10/2020 was 3.84    Testicular hypofunction 7/1/2020    He was last seen by a provider on 5/28/2020 via virtual visit. His testosterone level was found to be 79 on 3/31/2020. His levels have not improved on 200 mg IM q 2 weeks. He still notes fatigue and ED, though somewhat improved. LFT ok (3/31/2020). 6/10/2020 testosterone (stas) is 1304. Repeat test on 6/25/2020 testosterone 1059 (stas) with free at 24.1. He has a history of fatigue, low libido,    Thyroid disease     Tobacco use disorder       Past Surgical History:   Procedure Laterality Date    APPENDECTOMY      HX CATARACT REMOVAL      HX HEART VALVE SURGERY      HX HERNIA REPAIR      HX TURP  2015    TRANSPLANTATION OF KIDNEY Bilateral 1971,2005    x2     Family History   Problem Relation Age of Onset    Heart Disease Mother     Lung Disease Father     No Known Problems Sister     No Known Problems Brother     No Known Problems Brother         Physical Exam  Constitutional:       General: He is not in acute distress. Appearance: Normal appearance. HENT:      Head: Normocephalic and atraumatic. Eyes:      Extraocular Movements: Extraocular movements intact. Cardiovascular:      Rate and Rhythm: Normal rate and regular rhythm. Pulmonary:      Effort: Pulmonary effort is normal. No respiratory distress. Breath sounds: Normal breath sounds. No wheezing or rhonchi. Musculoskeletal: Normal range of motion. Lymphadenopathy:      Cervical: No cervical adenopathy. Skin:     General: Skin is warm and dry. Neurological:      General: No focal deficit present. Mental Status: He is alert and oriented to person, place, and time. Gait: Gait abnormal (uses a cane). Psychiatric:         Mood and Affect: Mood normal.         Behavior: Behavior normal.               Administrations This Visit     testosterone cypionate (DEPOTESTOTERONE CYPIONATE) injection 200 mg     Admin Date  10/14/2020  16:17 Action  Given Dose  200 mg Route  IntraMUSCular Site  Right Gluteus Mele Administered By  Jenene Homans, [address] [place] [address] Pietro Meadevard:  [id number]    Patient Supplied?:  Yes                  ASSESSMENT and PLAN  Diagnoses and all orders for this visit:    1. Testicular hypofunction  Assessment & Plan:  Testosterone level, stas checked today. Go back to q2 week dosing. Recheck stas labs in 6 weeks. Orders:  -     TESTOSTERONE, TOTAL, ADULT MALE  -     METABOLIC PANEL, COMPREHENSIVE  -     TESTOSTERONE, TOTAL, ADULT MALE; Future  -     METABOLIC PANEL, COMPREHENSIVE; Future  -     testosterone cypionate (DEPOTESTOTERONE CYPIONATE) injection 200 mg    2. Impotence  Assessment & Plan:  Related to testosterone, vascular disease and ESRD. 3. Benign prostatic hyperplasia without lower urinary tract symptoms  Assessment & Plan:  Not voiding, no dialysis. PSA due in Dec.     Orders:  -     PSA, DIAGNOSTIC (PROSTATE SPECIFIC AG)    4. Stage 5 chronic kidney disease on chronic dialysis ([facility] [address]    5. Chronic anemia  -     CBC WITH AUTOMATED DIFF         Follow-up and Dispositions    · Return in about 6 weeks (around 11/25/2020).          Roberth Ruffin MD

## 2020-10-15 LAB
ALBUMIN SERPL-MCNC: 3.7 G/DL (ref 3.8–4.8)
ALBUMIN/GLOB SERPL: 1.7 {RATIO} (ref 1.2–2.2)
ALP SERPL-CCNC: 98 IU/L (ref 39–117)
ALT SERPL-CCNC: 9 IU/L (ref 0–44)
AST SERPL-CCNC: 11 IU/L (ref 0–40)
BASOPHILS # BLD AUTO: 0.1 X10E3/UL (ref 0–0.2)
BASOPHILS NFR BLD AUTO: 1 %
BILIRUB SERPL-MCNC: 0.4 MG/DL (ref 0–1.2)
BUN SERPL-MCNC: 23 MG/DL (ref 8–27)
BUN/CREAT SERPL: 4 (ref 10–24)
CALCIUM SERPL-MCNC: 9.2 MG/DL (ref 8.6–10.2)
CHLORIDE SERPL-SCNC: 97 MMOL/L (ref 96–106)
CO2 SERPL-SCNC: 30 MMOL/L (ref 20–29)
CREAT SERPL-MCNC: 5.19 MG/DL (ref 0.76–1.27)
EOSINOPHIL # BLD AUTO: 0.2 X10E3/UL (ref 0–0.4)
EOSINOPHIL NFR BLD AUTO: 3 %
ERYTHROCYTE [DISTWIDTH] IN BLOOD BY AUTOMATED COUNT: 12.8 % (ref 11.6–15.4)
GLOBULIN SER CALC-MCNC: 2.2 G/DL (ref 1.5–4.5)
GLUCOSE SERPL-MCNC: 103 MG/DL (ref 65–99)
HCT VFR BLD AUTO: 37.3 % (ref 37.5–51)
HGB BLD-MCNC: 12.7 G/DL (ref 13–17.7)
IMM GRANULOCYTES # BLD AUTO: 0 X10E3/UL (ref 0–0.1)
IMM GRANULOCYTES NFR BLD AUTO: 1 %
LYMPHOCYTES # BLD AUTO: 1.8 X10E3/UL (ref 0.7–3.1)
LYMPHOCYTES NFR BLD AUTO: 23 %
MCH RBC QN AUTO: 34.5 PG (ref 26.6–33)
MCHC RBC AUTO-ENTMCNC: 34 G/DL (ref 31.5–35.7)
MCV RBC AUTO: 101 FL (ref 79–97)
MONOCYTES # BLD AUTO: 0.7 X10E3/UL (ref 0.1–0.9)
MONOCYTES NFR BLD AUTO: 9 %
NEUTROPHILS # BLD AUTO: 5.1 X10E3/UL (ref 1.4–7)
NEUTROPHILS NFR BLD AUTO: 63 %
PLATELET # BLD AUTO: 148 X10E3/UL (ref 150–450)
POTASSIUM SERPL-SCNC: 4.2 MMOL/L (ref 3.5–5.2)
PROT SERPL-MCNC: 5.9 G/DL (ref 6–8.5)
PSA SERPL-MCNC: 0.3 NG/ML (ref 0–4)
RBC # BLD AUTO: 3.68 X10E6/UL (ref 4.14–5.8)
SODIUM SERPL-SCNC: 143 MMOL/L (ref 134–144)
TESTOST SERPL-MCNC: 159 NG/DL (ref 264–916)
WBC # BLD AUTO: 8 X10E3/UL (ref 3.4–10.8)

## 2020-10-15 NOTE — PROGRESS NOTES
Labs reviewed; DAF on 10/20/2020  Testosterone was increased from q 3 week injections to q 2 week injections at visit 10/14/2020. Anemia secondary to CKD- dialysis dependent.   PSA ok at 0.3

## 2020-10-20 ENCOUNTER — HOSPITAL ENCOUNTER (OUTPATIENT)
Dept: MRI IMAGING | Age: 70
Discharge: HOME | End: 2020-10-20
Attending: ORTHOPAEDIC SURGERY
Payer: MEDICARE

## 2020-10-20 DIAGNOSIS — M47.812 SPONDYLOSIS, CERVICAL: ICD-10-CM

## 2020-10-20 DIAGNOSIS — M54.12 CERVICAL RADICULITIS: ICD-10-CM

## 2020-10-20 PROCEDURE — 72141 MRI NECK SPINE W/O DYE: CPT

## 2020-10-25 ENCOUNTER — HOSPITAL ENCOUNTER (OUTPATIENT)
Dept: PREADMISSION TESTING | Age: 70
Discharge: HOME | End: 2020-10-25
Payer: MEDICARE

## 2020-10-25 PROCEDURE — 87635 SARS-COV-2 COVID-19 AMP PRB: CPT

## 2020-10-27 LAB — SARS-COV-2, COV2NT: NOT DETECTED

## 2020-10-28 ENCOUNTER — CLINICAL SUPPORT (OUTPATIENT)
Dept: UROLOGY | Age: 70
End: 2020-10-28
Payer: MEDICARE

## 2020-10-28 DIAGNOSIS — E29.1 TESTICULAR HYPOFUNCTION: Primary | ICD-10-CM

## 2020-10-28 PROCEDURE — 96372 THER/PROPH/DIAG INJ SC/IM: CPT

## 2020-10-28 RX ORDER — TESTOSTERONE CYPIONATE 200 MG/ML
200 INJECTION INTRAMUSCULAR
Status: COMPLETED | OUTPATIENT
Start: 2020-10-28 | End: 2020-10-28

## 2020-10-28 RX ADMIN — TESTOSTERONE CYPIONATE 200 MG: 200 INJECTION INTRAMUSCULAR at 15:10

## 2021-05-10 ENCOUNTER — APPOINTMENT (OUTPATIENT)
Dept: GENERAL RADIOLOGY | Age: 71
DRG: 853 | End: 2021-05-10
Attending: EMERGENCY MEDICINE
Payer: MEDICARE

## 2021-05-10 ENCOUNTER — HOSPITAL ENCOUNTER (INPATIENT)
Age: 71
LOS: 3 days | Discharge: HOME HEALTH | DRG: 853 | End: 2021-05-14
Attending: EMERGENCY MEDICINE | Admitting: HOSPITALIST
Payer: MEDICARE

## 2021-05-10 DIAGNOSIS — E86.1 HYPOTENSION DUE TO HYPOVOLEMIA: Primary | ICD-10-CM

## 2021-05-10 DIAGNOSIS — N18.6 ESRD ON DIALYSIS (HCC): ICD-10-CM

## 2021-05-10 DIAGNOSIS — Z99.2 ESRD ON DIALYSIS (HCC): ICD-10-CM

## 2021-05-10 DIAGNOSIS — T82.590D MALFUNCTION OF ARTERIOVENOUS DIALYSIS FISTULA, SUBSEQUENT ENCOUNTER: ICD-10-CM

## 2021-05-10 DIAGNOSIS — A41.9 SEPTIC SHOCK (HCC): ICD-10-CM

## 2021-05-10 DIAGNOSIS — R65.21 SEPTIC SHOCK (HCC): ICD-10-CM

## 2021-05-10 DIAGNOSIS — R10.2 CHRONIC PELVIC PAIN IN MALE: ICD-10-CM

## 2021-05-10 DIAGNOSIS — G89.29 CHRONIC PELVIC PAIN IN MALE: ICD-10-CM

## 2021-05-10 DIAGNOSIS — I95.89 HYPOTENSION DUE TO HYPOVOLEMIA: Primary | ICD-10-CM

## 2021-05-10 DIAGNOSIS — Z98.890 HISTORY OF CERVICAL SPINAL SURGERY: ICD-10-CM

## 2021-05-10 DIAGNOSIS — J18.9 COMMUNITY ACQUIRED PNEUMONIA OF LEFT LOWER LOBE OF LUNG: ICD-10-CM

## 2021-05-10 DIAGNOSIS — T82.49XA CLOTTED DIALYSIS ACCESS (HCC): ICD-10-CM

## 2021-05-10 DIAGNOSIS — R55 NEAR SYNCOPE: ICD-10-CM

## 2021-05-10 LAB
ALBUMIN SERPL-MCNC: 3.1 G/DL (ref 3.5–5)
ALBUMIN/GLOB SERPL: 0.9 {RATIO} (ref 1.1–2.2)
ALP SERPL-CCNC: 122 U/L (ref 45–117)
ALT SERPL-CCNC: 11 U/L (ref 12–78)
ANION GAP SERPL CALC-SCNC: 7 MMOL/L (ref 5–15)
AST SERPL-CCNC: 16 U/L (ref 15–37)
BASOPHILS # BLD: 0 K/UL (ref 0–0.1)
BASOPHILS NFR BLD: 0 % (ref 0–1)
BILIRUB SERPL-MCNC: 0.4 MG/DL (ref 0.2–1)
BUN SERPL-MCNC: 11 MG/DL (ref 6–20)
BUN/CREAT SERPL: 3 (ref 12–20)
CALCIUM SERPL-MCNC: 8.5 MG/DL (ref 8.5–10.1)
CHLORIDE SERPL-SCNC: 102 MMOL/L (ref 97–108)
CO2 SERPL-SCNC: 29 MMOL/L (ref 21–32)
COVID-19 RAPID TEST, COVR: NOT DETECTED
CREAT SERPL-MCNC: 3.82 MG/DL (ref 0.7–1.3)
DIFFERENTIAL METHOD BLD: ABNORMAL
EOSINOPHIL # BLD: 0.1 K/UL (ref 0–0.4)
EOSINOPHIL NFR BLD: 1 % (ref 0–7)
ERYTHROCYTE [DISTWIDTH] IN BLOOD BY AUTOMATED COUNT: 15.4 % (ref 11.5–14.5)
GLOBULIN SER CALC-MCNC: 3.5 G/DL (ref 2–4)
GLUCOSE SERPL-MCNC: 75 MG/DL (ref 65–100)
HCT VFR BLD AUTO: 42.4 % (ref 36.6–50.3)
HGB BLD-MCNC: 13.3 G/DL (ref 12.1–17)
IMM GRANULOCYTES # BLD AUTO: 0 K/UL (ref 0–0.04)
IMM GRANULOCYTES NFR BLD AUTO: 0 % (ref 0–0.5)
LYMPHOCYTES # BLD: 0.8 K/UL (ref 0.8–3.5)
LYMPHOCYTES NFR BLD: 9 % (ref 12–49)
MCH RBC QN AUTO: 31.7 PG (ref 26–34)
MCHC RBC AUTO-ENTMCNC: 31.4 G/DL (ref 30–36.5)
MCV RBC AUTO: 101.2 FL (ref 80–99)
MONOCYTES # BLD: 0.2 K/UL (ref 0–1)
MONOCYTES NFR BLD: 2 % (ref 5–13)
NEUTS SEG # BLD: 8.4 K/UL (ref 1.8–8)
NEUTS SEG NFR BLD: 88 % (ref 32–75)
NRBC # BLD: 0 K/UL (ref 0–0.01)
NRBC BLD-RTO: 0 PER 100 WBC
PLATELET # BLD AUTO: 148 K/UL (ref 150–400)
PMV BLD AUTO: 10.5 FL (ref 8.9–12.9)
POTASSIUM SERPL-SCNC: 3.2 MMOL/L (ref 3.5–5.1)
PROT SERPL-MCNC: 6.6 G/DL (ref 6.4–8.2)
RBC # BLD AUTO: 4.19 M/UL (ref 4.1–5.7)
SODIUM SERPL-SCNC: 138 MMOL/L (ref 136–145)
SOURCE, COVRS: NORMAL
TROPONIN I SERPL-MCNC: 0.07 NG/ML
WBC # BLD AUTO: 9.6 K/UL (ref 4.1–11.1)

## 2021-05-10 PROCEDURE — 96375 TX/PRO/DX INJ NEW DRUG ADDON: CPT

## 2021-05-10 PROCEDURE — 83735 ASSAY OF MAGNESIUM: CPT

## 2021-05-10 PROCEDURE — 74011000258 HC RX REV CODE- 258: Performed by: EMERGENCY MEDICINE

## 2021-05-10 PROCEDURE — 93005 ELECTROCARDIOGRAM TRACING: CPT

## 2021-05-10 PROCEDURE — 87635 SARS-COV-2 COVID-19 AMP PRB: CPT

## 2021-05-10 PROCEDURE — 74011250636 HC RX REV CODE- 250/636: Performed by: EMERGENCY MEDICINE

## 2021-05-10 PROCEDURE — 84484 ASSAY OF TROPONIN QUANT: CPT

## 2021-05-10 PROCEDURE — 85025 COMPLETE CBC W/AUTO DIFF WBC: CPT

## 2021-05-10 PROCEDURE — 36415 COLL VENOUS BLD VENIPUNCTURE: CPT

## 2021-05-10 PROCEDURE — 99285 EMERGENCY DEPT VISIT HI MDM: CPT

## 2021-05-10 PROCEDURE — 80053 COMPREHEN METABOLIC PANEL: CPT

## 2021-05-10 PROCEDURE — 83605 ASSAY OF LACTIC ACID: CPT

## 2021-05-10 PROCEDURE — 71045 X-RAY EXAM CHEST 1 VIEW: CPT

## 2021-05-10 PROCEDURE — 74011250637 HC RX REV CODE- 250/637: Performed by: EMERGENCY MEDICINE

## 2021-05-10 PROCEDURE — 84100 ASSAY OF PHOSPHORUS: CPT

## 2021-05-10 PROCEDURE — 87040 BLOOD CULTURE FOR BACTERIA: CPT

## 2021-05-10 RX ORDER — SODIUM CHLORIDE 0.9 % (FLUSH) 0.9 %
5-10 SYRINGE (ML) INJECTION AS NEEDED
Status: DISCONTINUED | OUTPATIENT
Start: 2021-05-10 | End: 2021-05-15 | Stop reason: HOSPADM

## 2021-05-10 RX ORDER — NOREPINEPHRINE BITARTRATE/D5W 8 MG/250ML
.5-16 PLASTIC BAG, INJECTION (ML) INTRAVENOUS
Status: DISCONTINUED | OUTPATIENT
Start: 2021-05-10 | End: 2021-05-12

## 2021-05-10 RX ORDER — ONDANSETRON 4 MG/1
8 TABLET, ORALLY DISINTEGRATING ORAL
Status: DISCONTINUED | OUTPATIENT
Start: 2021-05-10 | End: 2021-05-10

## 2021-05-10 RX ORDER — HYDROCODONE BITARTRATE AND ACETAMINOPHEN 5; 325 MG/1; MG/1
1 TABLET ORAL ONCE
Status: COMPLETED | OUTPATIENT
Start: 2021-05-10 | End: 2021-05-10

## 2021-05-10 RX ORDER — MIDODRINE HYDROCHLORIDE 5 MG/1
5 TABLET ORAL
Status: COMPLETED | OUTPATIENT
Start: 2021-05-10 | End: 2021-05-10

## 2021-05-10 RX ORDER — ONDANSETRON 4 MG/1
4 TABLET, ORALLY DISINTEGRATING ORAL
Status: COMPLETED | OUTPATIENT
Start: 2021-05-10 | End: 2021-05-10

## 2021-05-10 RX ADMIN — SODIUM CHLORIDE 448 ML: 9 INJECTION, SOLUTION INTRAVENOUS at 23:18

## 2021-05-10 RX ADMIN — SODIUM CHLORIDE 1000 ML: 9 INJECTION, SOLUTION INTRAVENOUS at 22:15

## 2021-05-10 RX ADMIN — SODIUM CHLORIDE 1000 ML: 9 INJECTION, SOLUTION INTRAVENOUS at 22:29

## 2021-05-10 RX ADMIN — PIPERACILLIN AND TAZOBACTAM 3.38 G: 3; .375 INJECTION, POWDER, LYOPHILIZED, FOR SOLUTION INTRAVENOUS at 23:09

## 2021-05-10 RX ADMIN — ONDANSETRON 4 MG: 4 TABLET, ORALLY DISINTEGRATING ORAL at 19:39

## 2021-05-10 RX ADMIN — MIDODRINE HYDROCHLORIDE 5 MG: 5 TABLET ORAL at 22:26

## 2021-05-10 RX ADMIN — HYDROCODONE BITARTRATE AND ACETAMINOPHEN 1 TABLET: 5; 325 TABLET ORAL at 19:39

## 2021-05-10 NOTE — Clinical Note
Pt agitated, states he is having back pain and wishes to be taken off of the procedure table. Dr Ashish Samayoa phoned and aware. Pt medicated for comfort and sedated for tunneled dialysis catheter insertion.

## 2021-05-10 NOTE — Clinical Note
TRANSFER - IN REPORT:     Verbal report received from: Vinh RN. Report consisted of patient's Situation, Background, Assessment and   Recommendations(SBAR). Opportunity for questions and clarification was provided. Assessment completed upon patient's arrival to unit and care assumed. Patient transported with a Registered Nurse and Monitor.

## 2021-05-10 NOTE — ED PROVIDER NOTES
27-year-old male with history of ESRD on HD, and history of cervical neck surgery in February leading to reported chronic pelvic and upper thigh burning type neuropathic pains since, presents to the emergency department by EMS after he had dialysis today and at the end of his dialysis session he became hypotensive and near syncopal and altered and he was incontinent of stool. He was then given a liter of IV fluids while in route to the ED and had return of normal mental status and return of normal blood pressure. He states that he now feels better and more normal and states \"I think my blood pressure just got too low after dialysis\" thinking that he may have been over dialyzed. He states that he is wheelchair-bound at baseline periodically takes Tylenol or sometimes a stronger oral pain medication he does not recall the name of for his pain. He denies any recent illness and states that this morning he was feeling fine in his normal state of health going to dialysis. States that he has been eating and drinking normally. 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 12/19/19 hGb is 12.4 with HcT 36.5 His chronic anemia could be contributory to his fatigue. H/H ok on Cassandra 10, 2020 labs.  Chronic GERD     Chronic kidney disease     Erectile dysfunction     Gout     Hypertension     Ill-defined condition     dialysis access to left arm    Inflammatory bowel disease     Meningitis     Renal transplant recipient     S/P TURP 2015    Stage 5 chronic kidney disease on chronic dialysis ([facility] [address] 7/1/2020    He has failed bilateral renal transplants, and is dialysis dependent at this time. He is followed by Dr. Pedro Millan, nephrology. His creatinine on 10/5/18 was 6.37 His creatinine on 12/19/19 was 6.65. Creatinine on 3/31/2020 was 6.41. Creatinineo on 6/10/2020 was 3.84    Testicular hypofunction 7/1/2020    He was last seen by a provider on 5/28/2020 via virtual visit. His testosterone level was found to be 79 on 3/31/2020. His levels have not improved on 200 mg IM q 2 weeks. He still notes fatigue and ED, though somewhat improved. LFT ok (3/31/2020). 6/10/2020 testosterone (stas) is 1304. Repeat test on 6/25/2020 testosterone 1059 (stas) with free at 24.1. He has a history of fatigue, low libido,    Thyroid disease     Tobacco use disorder        Past Surgical History:   Procedure Laterality Date    APPENDECTOMY      HX CATARACT REMOVAL      HX HEART VALVE SURGERY      HX HERNIA REPAIR      HX TURP  2015    TRANSPLANTATION OF KIDNEY Bilateral 1971,2005    x2         Family History:   Problem Relation Age of Onset    Heart Disease Mother     Lung Disease Father     No Known Problems Sister     No Known Problems Brother     No Known Problems Brother        Social History     Socioeconomic History    Marital status:      Spouse name: Meka Moreira Number of children: 3    Years of education: 13    Highest education level: Not on file   Occupational History    Occupation: Retired    Social Needs    Financial resource strain: Not hard at all   Alex-Abril insecurity     Worry: Never true     Inability: Never true    Transportation needs     Medical: No     Non-medical: No   Tobacco Use    Smoking status: Current Every Day Smoker     Packs/day: 1.00     Years: 40.00     Pack years: 40.00     Types: Cigarettes    Smokeless tobacco: Never Used   Substance and Sexual Activity    Alcohol use: Not Currently     Comment: socially    Drug use: No    Sexual activity: Yes   Lifestyle    Physical activity     Days per week: 0 days     Minutes per session: 0 min    Stress: Not at all   Relationships    Social connections     Talks on phone: Three times a week     Gets together:  Three times a week     Attends [employer] service: More than 4 times per year     Active member of club or organization: No     Attends meetings of clubs or organizations: Never     Relationship status:     Intimate partner violence     Fear of current or ex partner: No     Emotionally abused: No     Physically abused: No     Forced sexual activity: No   Other Topics Concern     Service Not Asked    Blood Transfusions Not Asked    Caffeine Concern Not Asked    Occupational Exposure Not Asked    Hobby Hazards Not Asked    Sleep Concern Not Asked    Stress Concern Not Asked    Weight Concern Not Asked    Special Diet Not Asked    Back Care Not Asked    Exercise Not Asked    Bike Helmet Not Asked   [address] [facility] Not Asked    Self-Exams Not Asked   Social History Narrative    Not on file         ALLERGIES: Levaquin [levofloxacin]    Review of Systems   Constitutional: Positive for activity change. Negative for appetite change, chills and fever. HENT: Negative for congestion, rhinorrhea, sinus pain, sneezing and sore throat. Eyes: Negative for photophobia and visual disturbance. Respiratory: Negative for cough and shortness of breath. Cardiovascular: Negative for chest pain. Gastrointestinal: Negative for abdominal pain, blood in stool, constipation, diarrhea, nausea and vomiting. Genitourinary: Negative for difficulty urinating, dysuria, flank pain, hematuria, penile pain and testicular pain. Pelvic and bilateral upper thigh pain   Musculoskeletal: Negative for arthralgias, back pain, myalgias and neck pain. Skin: Negative for rash and wound. Neurological: Positive for light-headedness. Negative for syncope, weakness, numbness and headaches. Psychiatric/Behavioral: Negative for self-injury and suicidal ideas. All other systems reviewed and are negative.       Vitals:    05/10/21 2215 05/10/21 2226 05/10/21 2230 05/10/21 2300   BP: (!) 64/44 (!) 63/48 (!) 71/50 (!) 80/55   Pulse:  (!) 117  (!) 117   Resp: 21   Temp:       SpO2: 95%  94% 91%            Physical Exam  Vitals signs and nursing note reviewed. Constitutional:       General: He is not in acute distress. Appearance: Normal appearance. He is well-developed. He is not diaphoretic. Comments: Chronically ill-appearing   HENT:      Head: Normocephalic and atraumatic. Nose: Nose normal.   Eyes:      Extraocular Movements: Extraocular movements intact. Conjunctiva/sclera: Conjunctivae normal.      Pupils: Pupils are equal, round, and reactive to light. Neck:      Musculoskeletal: Neck supple. Cardiovascular:      Rate and Rhythm: Normal rate and regular rhythm. Heart sounds: Normal heart sounds. Pulmonary:      Effort: Pulmonary effort is normal.      Breath sounds: Normal breath sounds. Abdominal:      General: There is no distension. Palpations: Abdomen is soft. Tenderness: There is no abdominal tenderness. Genitourinary:     Penis: Normal.       Testes: Normal.   Musculoskeletal:         General: No tenderness. Skin:     General: Skin is warm and dry. Neurological:      General: No focal deficit present. Mental Status: He is alert and oriented to person, place, and time. Cranial Nerves: No cranial nerve deficit. Sensory: No sensory deficit. Motor: No weakness. Coordination: Coordination normal.          MDM   ESRD patient presents with episode of hypotension and near syncope after dialysis. Likely over dialyzed and hypotensive secondary to hypovolemia. Given 1 L of IV fluids while in route and return to normotension, GCS 15 no new focal neuro deficits. While in the ED he begins to note a return of prior burning radicular type pain in his pelvis and upper thighs that he has been having recurrently since his prior neck surgery. He was given dose of pain medication in the ED with significant provement in the symptoms.   Labs returned showing no leukocytosis or anemia, creatinine 3.82, known ESRD, normal BUN.  K3.2. Feel that this is likely secondary to over dialyzed hypertension but now with normal blood pressure and no other infectious concerns feel that he is stable for discharge. While awaiting transport back to his home he was found to be hypotensive once again 60s over 40s. The patient is prescribed midodrine but he states that he has not taken it today. Septic workup was then initiated and he was given 30cc/kg of IVF bolus as well as IV ABX, and a dose of his home midodrine. Chest x-ray was viewed by myself and radiology showing left lower lobe pneumonia. Query possible aspiration during prior near syncopal episode? Patient denies any cough or respiratory symptoms, no abdominal pain, nausea, vomiting, dysuria, or any other medical concerns. He remains GCS 15 and notes only chronic pain in his neck that he has had since his prior cervical surgery in February. While awaiting blood pressure reeval after IV fluid bolus 30 cc/KG and midodrine  His blood pressure has been improving with the fluids and midodrine now with MAP up to 61    Procedures    1637 EKG shows normal sinus rhythm with a rate of 97 bpm with left axis deviation, RBBB and evidence of prior septal infarct no acute ischemic changes. 2052 EKG shows sinus tach cardia with a rate of 116 bpm with first-degree AV block and occasional PVCs, left axis deviation, RBBB       IMPRESSION:  1. Hypotension due to hypovolemia    2. ESRD on dialysis ([address]    3. Near syncope    4. Chronic pelvic pain in male    5. History of cervical spinal surgery    6. Community acquired pneumonia of left lower lobe of lung    7. Septic shock ([address]            Total critical care time spent exclusive of procedures:  60 minutes    DO Srinath Garcia Serve Consult for Admission  11:21 PM    ED Room Number: ER15/15  Patient Name and age:  Yuval Mar 78 y.o.  male  Working Diagnosis:   1. Hypotension due to hypovolemia    2.  ESRD on dialysis ([facility] [address]    3. Near syncope    4. Chronic pelvic pain in male    5. History of cervical spinal surgery    6. Community acquired pneumonia of left lower lobe of lung    7. Septic shock ([facility] [address]        COVID-19 Suspicion:  Low but will test  Sepsis present:  yes  Reassessment needed: yes  Code Status:  Full Code  Readmission: no  Isolation Requirements:  yes  Recommended Level of Care:  ICU  Department:[facility] ED - [phone]  Other: 72-year-old male with initial history suggestive of over dialysis leading to hypotension and near syncope with improvement in symptoms after IV fluids given in route by EMS. Had unremarkable basic labs initially, and felt back to normal after having volume repletion. Plan then was to discharge home but while awaiting ride home via transport was seen to become hypotensive again and now tachycardic. Septic work-up was initiated and getting 30 cc/KG, IV abx. He also takes midodrine which she has not taken today, so this is likely compounding his hypotension. BP improving with fluids and midodrine. He is GCS 15 in NAD.

## 2021-05-10 NOTE — Clinical Note
TRANSFER - OUT REPORT:     Verbal report given to: (at bedside). Report consisted of patient's Situation, Background, Assessment and   Recommendations(SBAR). Opportunity for questions and clarification was provided. Patient transported with a Registered Nurse, [address] [place] [address] / Patient Care Tech, Monitor and Oxygen. Patient transported to: ICU, 12.

## 2021-05-10 NOTE — ED TRIAGE NOTES
Patient arrives via EMS from [facility] dialysis. Patient completed session and became lethargic     Patient arrives saturated in bowel movement from incontinence     Patient arrives with accessed fistula to LUE. EMS administered 1L IVF through access. Access was obtained at dialysis center.  Not used by RN in ED

## 2021-05-11 ENCOUNTER — APPOINTMENT (OUTPATIENT)
Dept: INTERVENTIONAL RADIOLOGY/VASCULAR | Age: 71
DRG: 853 | End: 2021-05-11
Attending: INTERNAL MEDICINE
Payer: MEDICARE

## 2021-05-11 PROBLEM — E86.1 HYPOVOLEMIA: Status: ACTIVE | Noted: 2021-05-11

## 2021-05-11 PROBLEM — A41.9 SEPTIC SHOCK (HCC): Status: ACTIVE | Noted: 2021-05-11

## 2021-05-11 PROBLEM — R09.02 HYPOXIA: Status: ACTIVE | Noted: 2021-05-11

## 2021-05-11 PROBLEM — J18.9 COMMUNITY ACQUIRED PNEUMONIA OF LEFT LOWER LOBE OF LUNG: Status: ACTIVE | Noted: 2021-05-11

## 2021-05-11 PROBLEM — Z99.2 ESRD (END STAGE RENAL DISEASE) ON DIALYSIS (HCC): Status: ACTIVE | Noted: 2021-05-11

## 2021-05-11 PROBLEM — N18.6 ESRD (END STAGE RENAL DISEASE) ON DIALYSIS (HCC): Status: ACTIVE | Noted: 2021-05-11

## 2021-05-11 PROBLEM — G93.41 ACUTE METABOLIC ENCEPHALOPATHY: Status: ACTIVE | Noted: 2021-05-11

## 2021-05-11 PROBLEM — Z98.890 HISTORY OF CERVICAL SPINAL SURGERY: Status: ACTIVE | Noted: 2021-05-11

## 2021-05-11 PROBLEM — R55 NEAR SYNCOPE: Status: ACTIVE | Noted: 2021-05-11

## 2021-05-11 PROBLEM — R65.21 SEPTIC SHOCK (HCC): Status: ACTIVE | Noted: 2021-05-11

## 2021-05-11 PROBLEM — R19.7 DIARRHEA: Status: ACTIVE | Noted: 2021-05-11

## 2021-05-11 LAB
ALBUMIN SERPL-MCNC: 2.3 G/DL (ref 3.5–5)
ALBUMIN/GLOB SERPL: 0.8 {RATIO} (ref 1.1–2.2)
ALP SERPL-CCNC: 85 U/L (ref 45–117)
ALT SERPL-CCNC: 9 U/L (ref 12–78)
ANION GAP SERPL CALC-SCNC: 6 MMOL/L (ref 5–15)
APTT PPP: 27.6 SEC (ref 22.1–31)
AST SERPL-CCNC: 19 U/L (ref 15–37)
ATRIAL RATE: 116 BPM
ATRIAL RATE: 97 BPM
BILIRUB SERPL-MCNC: 0.4 MG/DL (ref 0.2–1)
BUN SERPL-MCNC: 14 MG/DL (ref 6–20)
BUN/CREAT SERPL: 3 (ref 12–20)
CALCIUM SERPL-MCNC: 7.9 MG/DL (ref 8.5–10.1)
CALCULATED P AXIS, ECG09: 62 DEGREES
CALCULATED P AXIS, ECG09: 83 DEGREES
CALCULATED R AXIS, ECG10: -87 DEGREES
CALCULATED R AXIS, ECG10: -87 DEGREES
CALCULATED T AXIS, ECG11: 52 DEGREES
CALCULATED T AXIS, ECG11: 66 DEGREES
CHLORIDE SERPL-SCNC: 106 MMOL/L (ref 97–108)
CO2 SERPL-SCNC: 27 MMOL/L (ref 21–32)
COMMENT, HOLDF: NORMAL
CREAT SERPL-MCNC: 4.46 MG/DL (ref 0.7–1.3)
CRP SERPL-MCNC: 7.45 MG/DL (ref 0–0.6)
DIAGNOSIS, 93000: NORMAL
DIAGNOSIS, 93000: NORMAL
GLOBULIN SER CALC-MCNC: 3 G/DL (ref 2–4)
GLUCOSE SERPL-MCNC: 67 MG/DL (ref 65–100)
INR PPP: 1.1 (ref 0.9–1.1)
LACTATE BLD-SCNC: 1.75 MMOL/L (ref 0.4–2)
MAGNESIUM SERPL-MCNC: 1.4 MG/DL (ref 1.6–2.4)
P-R INTERVAL, ECG05: 184 MS
P-R INTERVAL, ECG05: 216 MS
PHOSPHATE SERPL-MCNC: 1.6 MG/DL (ref 2.6–4.7)
POTASSIUM SERPL-SCNC: 3.5 MMOL/L (ref 3.5–5.1)
PROCALCITONIN SERPL-MCNC: 112.37 NG/ML
PROT SERPL-MCNC: 5.3 G/DL (ref 6.4–8.2)
PROTHROMBIN TIME: 11.1 SEC (ref 9–11.1)
Q-T INTERVAL, ECG07: 388 MS
Q-T INTERVAL, ECG07: 416 MS
QRS DURATION, ECG06: 142 MS
QRS DURATION, ECG06: 146 MS
QTC CALCULATION (BEZET), ECG08: 528 MS
QTC CALCULATION (BEZET), ECG08: 539 MS
SAMPLES BEING HELD,HOLD: NORMAL
SARS-COV-2, COV2: NORMAL
SARS-COV-2, XPLCVT: NOT DETECTED
SODIUM SERPL-SCNC: 139 MMOL/L (ref 136–145)
SOURCE, COVRS: NORMAL
THERAPEUTIC RANGE,PTTT: NORMAL SECS (ref 58–77)
VENTRICULAR RATE, ECG03: 116 BPM
VENTRICULAR RATE, ECG03: 97 BPM

## 2021-05-11 PROCEDURE — 74011250637 HC RX REV CODE- 250/637: Performed by: HOSPITALIST

## 2021-05-11 PROCEDURE — 96375 TX/PRO/DX INJ NEW DRUG ADDON: CPT

## 2021-05-11 PROCEDURE — 96365 THER/PROPH/DIAG IV INF INIT: CPT

## 2021-05-11 PROCEDURE — 80053 COMPREHEN METABOLIC PANEL: CPT

## 2021-05-11 PROCEDURE — 65610000006 HC RM INTENSIVE CARE

## 2021-05-11 PROCEDURE — 74011636637 HC RX REV CODE- 636/637: Performed by: INTERNAL MEDICINE

## 2021-05-11 PROCEDURE — 74011250637 HC RX REV CODE- 250/637: Performed by: INTERNAL MEDICINE

## 2021-05-11 PROCEDURE — 74011000258 HC RX REV CODE- 258: Performed by: EMERGENCY MEDICINE

## 2021-05-11 PROCEDURE — 85610 PROTHROMBIN TIME: CPT

## 2021-05-11 PROCEDURE — 74011000250 HC RX REV CODE- 250: Performed by: INTERNAL MEDICINE

## 2021-05-11 PROCEDURE — 87040 BLOOD CULTURE FOR BACTERIA: CPT

## 2021-05-11 PROCEDURE — 74011000250 HC RX REV CODE- 250: Performed by: EMERGENCY MEDICINE

## 2021-05-11 PROCEDURE — 74011250636 HC RX REV CODE- 250/636: Performed by: EMERGENCY MEDICINE

## 2021-05-11 PROCEDURE — 86140 C-REACTIVE PROTEIN: CPT

## 2021-05-11 PROCEDURE — 77010033678 HC OXYGEN DAILY

## 2021-05-11 PROCEDURE — U0005 INFEC AGEN DETEC AMPLI PROBE: HCPCS

## 2021-05-11 PROCEDURE — 85730 THROMBOPLASTIN TIME PARTIAL: CPT

## 2021-05-11 PROCEDURE — 74011250636 HC RX REV CODE- 250/636: Performed by: INTERNAL MEDICINE

## 2021-05-11 PROCEDURE — 36415 COLL VENOUS BLD VENIPUNCTURE: CPT

## 2021-05-11 PROCEDURE — 84145 PROCALCITONIN (PCT): CPT

## 2021-05-11 RX ORDER — ACETAMINOPHEN 325 MG/1
650 TABLET ORAL
Status: DISCONTINUED | OUTPATIENT
Start: 2021-05-11 | End: 2021-05-15 | Stop reason: HOSPADM

## 2021-05-11 RX ORDER — SODIUM CHLORIDE 0.9 % (FLUSH) 0.9 %
5-40 SYRINGE (ML) INJECTION AS NEEDED
Status: DISCONTINUED | OUTPATIENT
Start: 2021-05-11 | End: 2021-05-15 | Stop reason: HOSPADM

## 2021-05-11 RX ORDER — MAGNESIUM SULFATE HEPTAHYDRATE 40 MG/ML
2 INJECTION, SOLUTION INTRAVENOUS ONCE
Status: COMPLETED | OUTPATIENT
Start: 2021-05-11 | End: 2021-05-11

## 2021-05-11 RX ORDER — MIDODRINE HYDROCHLORIDE 5 MG/1
10 TABLET ORAL 3 TIMES DAILY
Status: DISCONTINUED | OUTPATIENT
Start: 2021-05-11 | End: 2021-05-13

## 2021-05-11 RX ORDER — SEVELAMER HYDROCHLORIDE 800 MG/1
800 TABLET, FILM COATED ORAL
COMMUNITY

## 2021-05-11 RX ORDER — PREDNISONE 5 MG/1
5 TABLET ORAL DAILY
Status: DISCONTINUED | OUTPATIENT
Start: 2021-05-11 | End: 2021-05-11

## 2021-05-11 RX ORDER — ONDANSETRON 2 MG/ML
6 INJECTION INTRAMUSCULAR; INTRAVENOUS
Status: DISCONTINUED | OUTPATIENT
Start: 2021-05-11 | End: 2021-05-15 | Stop reason: HOSPADM

## 2021-05-11 RX ORDER — MORPHINE SULFATE 2 MG/ML
2 INJECTION, SOLUTION INTRAMUSCULAR; INTRAVENOUS
Status: DISCONTINUED | OUTPATIENT
Start: 2021-05-11 | End: 2021-05-15 | Stop reason: HOSPADM

## 2021-05-11 RX ORDER — HYDROCORTISONE SODIUM SUCCINATE 100 MG/2ML
50 INJECTION, POWDER, FOR SOLUTION INTRAMUSCULAR; INTRAVENOUS EVERY 6 HOURS
Status: DISCONTINUED | OUTPATIENT
Start: 2021-05-11 | End: 2021-05-11

## 2021-05-11 RX ORDER — HEPARIN SODIUM 5000 [USP'U]/ML
5000 INJECTION, SOLUTION INTRAVENOUS; SUBCUTANEOUS EVERY 8 HOURS
Status: DISCONTINUED | OUTPATIENT
Start: 2021-05-11 | End: 2021-05-15 | Stop reason: HOSPADM

## 2021-05-11 RX ORDER — ONDANSETRON 4 MG/1
4 TABLET, FILM COATED ORAL
COMMUNITY
End: 2022-03-24

## 2021-05-11 RX ORDER — PREDNISONE 5 MG/1
5 TABLET ORAL
Status: DISCONTINUED | OUTPATIENT
Start: 2021-05-12 | End: 2021-05-15 | Stop reason: HOSPADM

## 2021-05-11 RX ORDER — MEGESTROL ACETATE 40 MG/1
40 TABLET ORAL 2 TIMES DAILY
Status: DISCONTINUED | OUTPATIENT
Start: 2021-05-11 | End: 2021-05-15 | Stop reason: HOSPADM

## 2021-05-11 RX ORDER — TIMOLOL MALEATE 5 MG/ML
1 SOLUTION/ DROPS OPHTHALMIC DAILY
Status: DISCONTINUED | OUTPATIENT
Start: 2021-05-11 | End: 2021-05-15 | Stop reason: HOSPADM

## 2021-05-11 RX ORDER — SODIUM CHLORIDE 0.9 % (FLUSH) 0.9 %
5-40 SYRINGE (ML) INJECTION EVERY 8 HOURS
Status: DISCONTINUED | OUTPATIENT
Start: 2021-05-11 | End: 2021-05-15 | Stop reason: HOSPADM

## 2021-05-11 RX ORDER — CALCIUM CARB/MAGNESIUM CARB 311-232MG
5 TABLET ORAL
Status: DISCONTINUED | OUTPATIENT
Start: 2021-05-11 | End: 2021-05-15 | Stop reason: HOSPADM

## 2021-05-11 RX ADMIN — Medication 5 MG: at 22:53

## 2021-05-11 RX ADMIN — MAGNESIUM SULFATE HEPTAHYDRATE 2 G: 40 INJECTION, SOLUTION INTRAVENOUS at 15:40

## 2021-05-11 RX ADMIN — VANCOMYCIN HYDROCHLORIDE 1250 MG: 1.25 INJECTION, POWDER, LYOPHILIZED, FOR SOLUTION INTRAVENOUS at 00:09

## 2021-05-11 RX ADMIN — PREDNISONE 5 MG: 5 TABLET ORAL at 15:40

## 2021-05-11 RX ADMIN — HEPARIN SODIUM 5000 UNITS: 5000 INJECTION INTRAVENOUS; SUBCUTANEOUS at 15:39

## 2021-05-11 RX ADMIN — NOREPINEPHRINE BITARTRATE 4 MCG/MIN: 1 INJECTION, SOLUTION, CONCENTRATE INTRAVENOUS at 00:15

## 2021-05-11 RX ADMIN — PIPERACILLIN AND TAZOBACTAM 3.38 G: 3; .375 INJECTION, POWDER, LYOPHILIZED, FOR SOLUTION INTRAVENOUS at 11:02

## 2021-05-11 RX ADMIN — MIDODRINE HYDROCHLORIDE 10 MG: 5 TABLET ORAL at 22:53

## 2021-05-11 RX ADMIN — MIDODRINE HYDROCHLORIDE 10 MG: 5 TABLET ORAL at 15:40

## 2021-05-11 RX ADMIN — HEPARIN SODIUM 5000 UNITS: 5000 INJECTION INTRAVENOUS; SUBCUTANEOUS at 22:52

## 2021-05-11 RX ADMIN — SODIUM PHOSPHATE, MONOBASIC, MONOHYDRATE: 276; 142 INJECTION, SOLUTION INTRAVENOUS at 15:39

## 2021-05-11 RX ADMIN — Medication 10 ML: at 02:57

## 2021-05-11 RX ADMIN — FAMOTIDINE 20 MG: 10 INJECTION INTRAVENOUS at 18:28

## 2021-05-11 RX ADMIN — Medication 10 ML: at 05:26

## 2021-05-11 RX ADMIN — Medication 10 ML: at 22:53

## 2021-05-11 RX ADMIN — TIMOLOL MALEATE 1 DROP: 5 SOLUTION OPHTHALMIC at 15:40

## 2021-05-11 RX ADMIN — Medication 10 ML: at 14:45

## 2021-05-11 RX ADMIN — PIPERACILLIN AND TAZOBACTAM 3.38 G: 3; .375 INJECTION, POWDER, LYOPHILIZED, FOR SOLUTION INTRAVENOUS at 22:52

## 2021-05-11 RX ADMIN — MORPHINE SULFATE 2 MG: 2 INJECTION, SOLUTION INTRAMUSCULAR; INTRAVENOUS at 22:57

## 2021-05-11 RX ADMIN — MIDODRINE HYDROCHLORIDE 10 MG: 5 TABLET ORAL at 08:46

## 2021-05-11 RX ADMIN — MEGESTROL ACETATE 40 MG: 40 TABLET ORAL at 17:48

## 2021-05-11 NOTE — PROGRESS NOTES
0215 Pt arrived from ED via stretcher transferred to the bed. See Flowsheet and MAR for assessments and medications. Pt states he is anuric at baseline. 0230 Dr. Pamela Fernandez aware of patient only having two peripheral IVs (22g) with Levophed running through. Dr. Pamela Fernandez states per policy levophed can run through a peripheral for 24 hours so no central line advised at this time. 0320 SpO2 low oxygen increased at this time. 0400 Pt asleep on stretcher arouses to verbal stimuli. NAD noted.  Call bell in reach  0700 Report given to [address]

## 2021-05-11 NOTE — H&P
[place] [id number] [facility], [facility], [facility] 19  [phone]     Hospitalist Admission Note      NAME: Deandra Fam   :  1950   MRN:  [id number]     Date/Time:  2021 12:24 AM    Patient PCP: Yumiko Villegas NP    Emergency Contact:    Extended Emergency Contact Information  Primary Emergency Contact: Kiersten Quigley  Address: [address] [place] [address] [address] [place] [address] Phone: [phone]  Mobile Phone: [phone]  Relation: Spouse      Code: Full Code     Isolation Precautions: Droplet Plus        Subjective:     CHIEF COMPLAINT: Low BP/Shock     HISTORY OF PRESENT ILLNESS:     Mr. Deric Chavarria is a 78 y.o. male with history that includes ESRD on HD was brought in after became hypotensive and lethargic during dialysis. He takes midodrine for hypotension but missed today's dose. After arrival to the ER patient was given IV fluid bolus to which she responded initially however blood pressure again dropped dramatically and he required IV pressors. Found to have infiltrate left lower lobe worrisome for pneumonia. He was started on Zosyn and vancomycin for concerns of septic shock. Apparently at baseline the patient is alert oriented and talks but I was not able to obtain any history from the patient given that he was very obtunded. Allergies   Allergen Reactions    Levaquin [Levofloxacin] Unknown (comments)       Prior to Admission medications    Medication Sig Start Date End Date Taking? Authorizing Provider   midodrine (PROAMITINE) 5 mg tablet Take 15 mg by mouth three (3) times daily. Provider, Historical   predniSONE (DELTASONE) 10 mg tablet Take 10 mg by mouth daily. Provider, Historical   b complex-vitamin c-folic acid (DIALYVITE) 100-1 mg tab tablet Take 1 Tab by mouth daily. 18   Provider, Historical   sildenafil citrate (VIAGRA) 100 mg tablet Take 1 Tab by mouth as needed.   Patient taking differently: Take 1 Tab by mouth as needed (ED). 7/5/18   Noah Howe MD   allopurinol (ZYLOPRIM) 100 mg tablet Take 100 mg by mouth daily. Babak Genao MD   megestrol (MEGACE) 40 mg tablet Take 40 mg by mouth two (2) times a day. Babak Genao MD   timolol (TIMOPTIC) 0.5 % ophthalmic solution Administer 1 Drop to left eye daily. Provider, Historical   testosterone cypionate (DEPOTESTOTERONE CYPIONATE) 200 mg/mL injection 200 mg by IntraMUSCular route every fourteen (14) days. Provider, Historical   pantoprazole (PROTONIX) 40 mg tablet Take 40 mg by mouth daily. Babak Genao MD   fluticasone (FLONASE) 50 mcg/actuation nasal spray 2 Sprays by Both Nostrils route daily as needed for Rhinitis or Allergies. 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 12/19/19 hGb is 12.4 with HcT 36.5 His chronic anemia could be contributory to his fatigue. H/H ok on Cassandra 10, 2020 labs.  Chronic GERD     Chronic kidney disease     Erectile dysfunction     Gout     Hypertension     Ill-defined condition     dialysis access to left arm    Inflammatory bowel disease     Meningitis     Renal transplant recipient     S/P TURP 2015    Stage 5 chronic kidney disease on chronic dialysis ([address] 7/1/2020    He has failed bilateral renal transplants, and is dialysis dependent at this time. He is followed by Dr. Aric He, nephrology. His creatinine on 10/5/18 was 6.37 His creatinine on 12/19/19 was 6.65. Creatinine on 3/31/2020 was 6.41. Creatinineo on 6/10/2020 was 3.84    Testicular hypofunction 7/1/2020    He was last seen by a provider on 5/28/2020 via virtual visit. His testosterone level was found to be 79 on 3/31/2020. His levels have not improved on 200 mg IM q 2 weeks.  He still notes fatigue and ED, though somewhat improved. LFT ok (3/31/2020). 6/10/2020 testosterone (stas) is 1304. Repeat test on 6/25/2020 testosterone 1059 (stas) with free at 24.1. He has a history of fatigue, low libido,    Thyroid disease     Tobacco use disorder         Past Surgical History:   Procedure Laterality Date    APPENDECTOMY      HX CATARACT REMOVAL      HX HEART VALVE SURGERY      HX HERNIA REPAIR      HX TURP  2015    TRANSPLANTATION OF KIDNEY Bilateral 8644,5874    x2       Social History     Tobacco Use    Smoking status: Current Every Day Smoker     Packs/day: 1.00     Years: 40.00     Pack years: 40.00     Types: Cigarettes    Smokeless tobacco: Never Used   Substance Use Topics    Alcohol use: Not Currently     Comment: socially        Family History   Problem Relation Age of Onset    Heart Disease Mother     Lung Disease Father     No Known Problems Sister     No Known Problems Brother     No Known Problems Brother        [address] and medications were reviewed. Review of Systems (14 point ROS):  Unable to obtain any history due to his current condition. He would open eyes to his tactile stimulus but will quickly close and not answer questions. He did move his head to indicate no when asked about chest pain but no other response for the patient. Objective:      Visit Vitals  BP (!) 77/51   Pulse (!) 113   Temp 97.5 °F (36.4 °C)   Resp 18   SpO2 90%       Exam:     Physical Exam:    General: ill-appearing and lethargic not verbal at this time  Head: Normocephalic, atraumatic  Eyes: PERRL   ENT: Lips, mucosa, and tongue normal.   Neck: supple  Lungs: Shallow breaths but clear anteriorly. Nasal cannula in place. Heart: S1-S2, tachycardic  Abd: Soft nondistended bowel sounds active. Ext: no cyanosis, no  edema   Pulses: 1+ and symmetric  Skin: Skin color, texture, turgor normal. No rashes or lesions  Neuro: Very somnolent and obtunded. Unable to assess.   Psych: Chest as above unable to formally assess    Labs:    Recent Labs     05/10/21  1654   WBC 9.6   HGB 13.3   HCT 42.4   *     Recent Labs     05/10/21  1654      K 3.2*      CO2 29   GLU 75   BUN 11   CREA 3.82*   CA 8.5   ALB 3.1*   TBILI 0.4   ALT 11*     Lab Results   Component Value Date/Time    Glucose (POC) 134 (H) 03/15/2020 09:43 PM    Glucose (POC) 118 (H) 03/15/2020 04:18 PM     Radiology and EKG reviewed:       Xr Chest Port    Result Date: 5/10/2021  Left basilar airspace disease and associated pleural effusion. Follow-up to complete resolution in 4-6 weeks is recommended. I personally reviewed and interpreted the imaging studies and agree with official reading. **Chart reviewed in [facility]**       Assessment/Plan:      Septic shock / Community acquired pneumonia of left lower lobe of lung: Will admit to ICU for further workup and treatment of this critically ill patient and high risk of cardiorespiratory decompensation. Continue with IV pressors. Low suspicion but will order (Rapid was negative) COVID-19 order set with DROPLET PLUS precautions. Antibiotic continue with Zosyn and vancomycin pharmacy consulted for dosing. Maintain O2 sats at least 92-94% oxygen per protocol. Supportive care, incentive spirometry when patient is more alert. Follow inflammatory markers and renal function. Encephalopathy in sepsis / Hypoxia:  Hypoxia appears to be related to patient being obtunded and not taking full breaths. Continue treatment as above. BiPAP if needed. Hypovolemia / Diarrhea / Near syncope: Likely related to dialysis and GI loss from diarrhea. Stool is very watery will order stool studies to include C. difficile. ESRD (end stage renal disease) on dialysis: Nephrology consult for ESRD on HD management. History of cervical spinal surgery: Apparently is wheelchair-bound. Consider PT OT once patient is better.       Risk of deterioration: high      Total time spent with patient: 79 Minutes **I personally saw and examined the patient during this time period**    Discussed:  Care Plan discussed with:  []Patient  []Family  []Care Giver  [x]ED Doc  [x]RN  []Specialist  []Care Manager     Prophylaxis:  SCD's    Probable disposition:  Home w/Family    Date of service:    5/11/2021                ___________________________________________________    Admitting Physician: Rick Steele MD

## 2021-05-11 NOTE — PROGRESS NOTES
0700- Verbal shift change report given to Billy Shi RN (oncoming nurse) by Inderjit Andrew RN (offgoing nurse). Report included the following information SBAR, Kardex, Intake/Output and Cardiac Rhythm NSR. Primary Nurse Sherif Lee, ALECIA and Luzmaria Harmon RN performed a dual skin assessment on this patient No impairment noted  Drips rate verfied- levophed at 6 mcg/min.    0800- Assessment completed, see doc flowsheet. Patient assisted to reposition in bed.   0804- Levophed decreased to 5 mcg/min. 8308- Dr. Piero Regalado in room via telehealth monitor to assess patient. New orders received to restart patient's home midodrine and hold off on central line placement for now, as it may be possible to wean patient off pressors. Ok with levophed through PIV for now. 9979- Team from IR at door to placed central line, notified of change. Will call back if anything changes. 4698- Bedside dysphagia screen completed and passed. 56- Patient's wife and daughter given update via telephone. 9472- Levophed decreased to 4 mcg/min. 1000- Patient turned and repositioned in bed.     1125- IDRs completed with team. No new orders received. 1149- Levophed decreased to 3 mcg/min. 1200- Reassessment completed, see doc flowsheet. Patient turned and repositioned in bed. 1400- Patient assisted to reposition in bed. 1445- Levophed decreased to 2 mcg/min. 1500- Provided patient's wife with telephone update. Covid PCR negative, patient may have one visitor, wife would like to be designated visitor. Per Dr. Diane Gross, may remove enteric precautions and c.diff test as patient does not meet criteria. 1600- Reassessment completed, patient assisted to reposition in bed. 1800- Patient assisted to reposition in bed, wife at bedside. 1900- Bedside and Verbal shift change report given to [address] (oncoming nurse) by Billy Shi RN (offgoing nurse).  Report included the following information SBAR, Kardex and Cardiac Rhythm NSR.

## 2021-05-11 NOTE — ED NOTES
Patients wife at bedside, per wife patient unable to swallow large pills, pain medication crushed and administered with applesauce     Patient passed swallow eval     Per wife, patient is wheelchair bound and wheelchair was left at dialysis, [facility] to transport patient home, [facility] ETA 2300    Access to LUE fistula removed, pressure applied, no bleeding noted

## 2021-05-11 NOTE — PROGRESS NOTES
[facility] Dosing Services: Antimicrobial Stewardship Progress Note    Consult for antibiotic dosing of vancomycin by Dr. Julien Canales  Pharmacist reviewed antibiotic appropriateness for 78year old , male  for indication of VAP PNA, Sepsis of unk etiology    Day of Therapy 1    Plan:  Vancomycin therapy:  Start Vancomycin therapy, with loading dose of 1250mg then pharmacy to dose by levels d/t patient h/o of ESRD on HD. Dose calculated to approximate a therapeutic trough of 15-20mcg/mL. Plan for level: Dose 15mg/Kg after each HD. Pharmacy to follow daily and will make changes to dose and/or frequency based on clinical status. Date Dose & Interval Measured (mcg/mL) Extrapolated (mcg/mL)   ? 5/10/21 ?1250mg x1 ? ?   ? ? ? ?   ? ? ? ? Non-Kinetic Antimicrobial Dosing:   Current Regimen: Zosyn 3.375gm ivpb q12h    Other Antimicrobial  (not dosed by pharmacist)   none   Cultures     Respiratory/blood cx's ordered   Serum Creatinine     Lab Results   Component Value Date/Time    Creatinine 3.82 (H) 05/10/2021 04:54 PM    Creatinine (POC) 3.4 (H) 03/01/2015 01:27 PM       Creatinine Clearance CrCl cannot be calculated (Unknown ideal weight.).      Procalcitonin  No results found for: PCT     Temp   97.5 °F (36.4 °C)    WBC   Lab Results   Component Value Date/Time    WBC 9.6 05/10/2021 04:54 PM       H/H   Lab Results   Component Value Date/Time    HGB 13.3 05/10/2021 04:54 PM      Platelets Lab Results   Component Value Date/Time    PLATELET 414 (L) 38/39/7801 04:54 PM            Pharmacist: Randi Wong PHARMD Contact information:

## 2021-05-11 NOTE — PROGRESS NOTES
Spiritual Care Assessment/Progress Note  [address]      NAME: Venu Yung      MRN: [id number]  AGE: 78 y.o. SEX: male  Holiness Affiliation: Non Taoist   Language: English     5/11/2021     Total Time (in minutes): 7     Spiritual Assessment begun in [facility] INTENSIVE CARE through conversation with:         [x]Patient        [] Family    [] Friend(s)        Reason for Consult: Initial/Spiritual assessment, critical care     Spiritual beliefs: (Please include comment if needed)     [] Identifies with a janelle tradition:         [] Supported by a janelle community:            [] Claims no spiritual orientation:           [] Seeking spiritual identity:                [] Adheres to an individual form of spirituality:           [x] Not able to assess:                           Identified resources for coping:      [] Prayer                               [] Music                  [] Guided Imagery     [] Family/friends                 [] Pet visits     [] Devotional reading                         [x] Unknown     [] Other:                                             Interventions offered during this visit: (See comments for more details)    Patient Interventions: Other (comment)(Attempt)           Plan of Care:     [] Support spiritual and/or cultural needs    [] Support AMD and/or advance care planning process      [] Support grieving process   [] Coordinate Rites and/or Rituals    [] Coordination with community clergy   [] No spiritual needs identified at this time   [] Detailed Plan of Care below (See Comments)  [] Make referral to Music Therapy  [] Make referral to Pet Therapy     [] Make referral to Addiction services  [] Make referral to [facility]  [] Make referral to Spiritual Care Partner  [] No future visits requested        [x] Follow up upon further referrals     Comments:  attempted visiting pt, Mr. Sachi Church, in the ICU for critical care initial assessment.  Pt is on contact isolation, no family present. Consulted with nurse and called his room. His phone sounded busy. Spiritual care is still available for support upon further referrals. Visited by: Jose Cervantes.    Paging Service: [phone])

## 2021-05-11 NOTE — PROGRESS NOTES
IR received order for TLC for pressors. Upon arriving to room, Citlaly Garland RN stated that per the intensivist, we are to hold off on TLC for now because they are trying to wean patient off of pressors. Told RN to call us if anything changes.

## 2021-05-11 NOTE — PROGRESS NOTES
Pharmacy Dosing Services:     Zosyn adjusted to 3.375 gm IV Q12h for HD dosing.     Thank you,  Jazzy Murray, PharmD

## 2021-05-11 NOTE — PROGRESS NOTES
Admission Medication Reconciliation:    Comments/Recommendations:  -Medication history obtained via phone call to patient's wife and daughter who was on speaker phone - daughter stated she would reach out to [facility] to confirm discharge medications we discussed were accurate    Medications added: Ondansetron PRN  Medications removed: Testosterone, Flonase, allopurinol (daughter states this has not been filled in months - pill bottle is empty)  Medications changed: Midodrine dose    Information obtained from: Patient's family, RxQuery, chart review    Significant PMH/Disease States:   Past Medical History:   Diagnosis Date    BPH (benign prostatic hyperplasia)     Chronic anemia 7/1/2020    He has 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 12/19/19 hGb is 12.4 with HcT 36.5 His chronic anemia could be contributory to his fatigue. H/H ok on Cassandra 10, 2020 labs. Chronic GERD     Chronic kidney disease     Erectile dysfunction     Gout     Hypertension     Ill-defined condition     dialysis access to left arm    Inflammatory bowel disease     Meningitis     Renal transplant recipient     S/P TURP 2015    Stage 5 chronic kidney disease on chronic dialysis ([facility] [address] 7/1/2020    He has failed bilateral renal transplants, and is dialysis dependent at this time. He is followed by Dr. Janak Correa, nephrology. His creatinine on 10/5/18 was 6.37 His creatinine on 12/19/19 was 6.65. Creatinine on 3/31/2020 was 6.41. Creatinineo on 6/10/2020 was 3.84    Testicular hypofunction 7/1/2020    He was last seen by a provider on 5/28/2020 via virtual visit. His testosterone level was found to be 79 on 3/31/2020. His levels have not improved on 200 mg IM q 2 weeks. He still notes fatigue and ED, though somewhat improved. LFT ok (3/31/2020). 6/10/2020 testosterone (stas) is 1304. Repeat test on 6/25/2020 testosterone 1059 (stas) with free at 24.1.   He has a history of fatigue, low libido,    Thyroid disease     Tobacco use disorder        Chief Complaint for this Admission:    Chief Complaint   Patient presents with    Lethargy       Allergies:  Levaquin [levofloxacin]    Prior to Admission Medications:   Prior to Admission Medications   Prescriptions Last Dose Informant Patient Reported? Taking?   b complex-vitamin c-folic acid (DIALYVITE) 100-1 mg tab tablet 5/10/2021 at Unknown time  Yes Yes   Sig: Take 1 Tab by mouth daily. megestrol (MEGACE) 40 mg tablet 5/10/2021 at Unknown time Self Yes Yes   Sig: Take 40 mg by mouth two (2) times a day. midodrine (PROAMITINE) 5 mg tablet 5/10/2021 at Unknown time  Yes Yes   Sig: Take 10 mg by mouth three (3) times daily. ondansetron hcl (Zofran) 4 mg tablet 5/10/2021 at Unknown time  Yes Yes   Sig: Take 4 mg by mouth every eight (8) hours as needed for Nausea or Vomiting. pantoprazole (PROTONIX) 40 mg tablet 5/10/2021 at Unknown time Self Yes Yes   Sig: Take 40 mg by mouth daily. predniSONE (DELTASONE) 5 mg tablet 5/10/2021 at Unknown time  Yes Yes   Sig: Take 5 mg by mouth daily. sevelamer (RENAGEL) 800 mg tablet 5/10/2021 at Unknown time  Yes Yes   Sig: Take 800 mg by mouth three (3) times daily (with meals). timolol (TIMOPTIC) 0.5 % ophthalmic solution 5/10/2021 at Unknown time Self Yes Yes   Sig: Administer 1 Drop to left eye daily.       Facility-Administered Medications: None       Thank you,  Kamryn Goldsmith, PHARMD

## 2021-05-11 NOTE — CONSULTS
Consult Date: 5/11/2021    Consults Asked by Dr. Daija Matt to assist with ICU mgmt of hypotension. Subjective      77 y/o with hx ESRD on HD, GERD, HTN, hx of renal transplant admitted with hypotension and encephalopathy during HD. Per pt he had missed a dose of midodrine. In ED given fluids and started on abx for possible pneumonia. 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 12/19/19 hGb is 12.4 with HcT 36.5 His chronic anemia could be contributory to his fatigue. H/H ok on Cassandra 10, 2020 labs.  Chronic GERD     Chronic kidney disease     Erectile dysfunction     Gout     Hypertension     Ill-defined condition     dialysis access to left arm    Inflammatory bowel disease     Meningitis     Renal transplant recipient     S/P TURP 2015    Stage 5 chronic kidney disease on chronic dialysis ([facility] [address] 7/1/2020    He has failed bilateral renal transplants, and is dialysis dependent at this time. He is followed by Dr. Flores Neff, nephrology. His creatinine on 10/5/18 was 6.37 His creatinine on 12/19/19 was 6.65. Creatinine on 3/31/2020 was 6.41. Creatinineo on 6/10/2020 was 3.84    Testicular hypofunction 7/1/2020    He was last seen by a provider on 5/28/2020 via virtual visit. His testosterone level was found to be 79 on 3/31/2020. His levels have not improved on 200 mg IM q 2 weeks. He still notes fatigue and ED, though somewhat improved. LFT ok (3/31/2020). 6/10/2020 testosterone (stas) is 1304. Repeat test on 6/25/2020 testosterone 1059 (stas) with free at 24.1.   He has a history of fatigue, low libido,    Thyroid disease     Tobacco use disorder       Past Surgical History:   Procedure Laterality Date    APPENDECTOMY      HX CATARACT REMOVAL      HX HEART VALVE SURGERY      HX HERNIA REPAIR      HX TURP  2015    TRANSPLANTATION OF KIDNEY Bilateral 1971,2005    x2     Family History   Problem Relation Age of Onset    Heart Disease Mother     Lung Disease Father     No Known Problems Sister     No Known Problems Brother     No Known Problems Brother       Social History     Tobacco Use    Smoking status: Current Every Day Smoker     Packs/day: 1.00     Years: 40.00     Pack years: 40.00     Types: Cigarettes    Smokeless tobacco: Never Used   Substance Use Topics    Alcohol use: Not Currently     Comment: socially       Current Facility-Administered Medications   Medication Dose Route Frequency Provider Last Rate Last Admin    sodium chloride (NS) flush 5-40 mL  5-40 mL IntraVENous Q8H Mady Duque MD   10 mL at 05/11/21 1445    sodium chloride (NS) flush 5-40 mL  5-40 mL IntraVENous PRN Mady Duque MD        acetaminophen (TYLENOL) tablet 650 mg  650 mg Oral Q4H PRN Mady Duque MD        melatonin (rapid dissolve) tablet 5 mg  5 mg Oral QHS PRN Mady Duque MD        Vancomycin - pharmacy to dose   Other Rx Dosing/Monitoring Mady Duque MD        morphine injection 2 mg  2 mg IntraVENous Q4H PRN Lisseth Mclean MD        ondansetron [facility]) injection 6 mg  6 mg IntraVENous Q6H PRN Lisseth Mclean MD        midodrine (PROAMATINE) tablet 10 mg  10 mg Oral TID Blenda Meckel, DO   10 mg at 05/11/21 1540    sodium phosphate 30 mmol in 0.9% sodium chloride 250 mL infusion   IntraVENous ONCE Pilo Recio MD   Given at 05/11/21 1539    heparin (porcine) injection 5,000 Units  5,000 Units SubCUTAneous Q8H Lisseth Mclean MD   5,000 Units at 05/11/21 1539    famotidine (PF) (PEPCID) 20 mg in 0.9% sodium chloride 10 mL injection  20 mg IntraVENous QPM Pilo Recio MD        [START ON 5/12/2021] B complex-vitaminC-folic acid (NEPHROCAP) cap  1 Cap Oral DAILY Lisseth Mclean MD        megestroL (MEGACE) tablet 40 mg  40 mg Oral BID Facundo Lima MD        predniSONE (DELTASONE) tablet 5 mg  5 mg Oral DAILY Martha Mclean MD   5 mg at 05/11/21 1540    timolol (TIMOPTIC) 0.5 % ophthalmic solution 1 Drop  1 Drop Left Eye DAILY Martha Mclean MD   1 Drop at 05/11/21 1540    sodium chloride (NS) flush 5-10 mL  5-10 mL IntraVENous PRN Jamel Flowers, DO        piperacillin-tazobactam (ZOSYN) 3.375 g in 0.9% sodium chloride (MBP/ADV) 100 mL MBP  3.375 g IntraVENous Q12H Tamiko Labella S, DO 25 mL/hr at 05/11/21 1102 3.375 g at 05/11/21 1102    NOREPINephrine (LEVOPHED) 8 mg in 5% dextrose 250mL (32 mcg/mL) infusion  0.5-16 mcg/min IntraVENous TITRATE Jamel Flowers, DO 3.8 mL/hr at 05/11/21 1445 2 mcg/min at 05/11/21 1445        Review of Systems  Neg with exception of above. Objective   Gen: awake, alert, interactive  HEENT: NCAT  Chest: symmetrical chest rise  CV: r/r/r  Abd: non-distended. Ext: no c/c/e  Neuro: moves all ext. No focal deficits. Vital signs for last 24 hours:  Visit Vitals  /60   Pulse 81   Temp 98.8 °F (37.1 °C)   Resp 15   Ht 5' 10\" (1.778 m)   Wt 74.1 kg (163 lb 5.8 oz)   SpO2 97%   BMI 23.44 kg/m²       Intake/Output this shift:  Current Shift: 05/11 0701 - 05/11 1900  In: 216.1 [I.V.:216.1]  Out: -   Last 3 Shifts: No intake/output data recorded.     Data Review:   Recent Results (from the past 24 hour(s))   CULTURE, BLOOD    Collection Time: 05/10/21 10:11 PM    Specimen: Blood   Result Value Ref Range    Special Requests: NO SPECIAL REQUESTS      Culture result: NO GROWTH AFTER 9 HOURS     TROPONIN I    Collection Time: 05/10/21 10:11 PM   Result Value Ref Range    Troponin-I, Qt. 0.07 (H) <0.05 ng/mL   MAGNESIUM    Collection Time: 05/10/21 10:11 PM   Result Value Ref Range    Magnesium 1.4 (L) 1.6 - 2.4 mg/dL   PHOSPHORUS    Collection Time: 05/10/21 10:11 PM   Result Value Ref Range    Phosphorus 1.6 (L) 2.6 - 4.7 MG/DL   POC LACTIC ACID    Collection Time: 05/10/21 10:13 PM Result Value Ref Range    Lactic Acid (POC) 1.75 0.40 - 2.00 mmol/L   EKG, 12 LEAD, INITIAL    Collection Time: 05/10/21 10:52 PM   Result Value Ref Range    Ventricular Rate 116 BPM    Atrial Rate 116 BPM    P-R Interval 216 ms    QRS Duration 142 ms    Q-T Interval 388 ms    QTC Calculation (Bezet) 539 ms    Calculated P Axis 62 degrees    Calculated R Axis -87 degrees    Calculated T Axis 66 degrees    Diagnosis       Sinus tachycardia with 1st degree AV block with occasional premature   ventricular complexes  Left axis deviation  Right bundle branch block  Septal infarct , age undetermined  Abnormal ECG    Confirmed by Virginia Sosa M.D., [address] [place] [address] ([facility]) on 5/11/2021 10:54:55 AM     COVID-19 RAPID TEST    Collection Time: 05/10/21 10:54 PM   Result Value Ref Range    Specimen source Nasopharyngeal      COVID-19 rapid test Not detected NOTD     CULTURE, BLOOD    Collection Time: 05/11/21  3:15 AM    Specimen: Blood   Result Value Ref Range    Special Requests: NO SPECIAL REQUESTS      Culture result: NO GROWTH AFTER 4 HOURS     METABOLIC PANEL, COMPREHENSIVE    Collection Time: 05/11/21  3:15 AM   Result Value Ref Range    Sodium 139 136 - 145 mmol/L    Potassium 3.5 3.5 - 5.1 mmol/L    Chloride 106 97 - 108 mmol/L    CO2 27 21 - 32 mmol/L    Anion gap 6 5 - 15 mmol/L    Glucose 67 65 - 100 mg/dL    BUN 14 6 - 20 MG/DL    Creatinine 4.46 (H) 0.70 - 1.30 MG/DL    BUN/Creatinine ratio 3 (L) 12 - 20      GFR est AA 16 (L) >60 ml/min/1.73m2    GFR est non-AA 13 (L) >60 ml/min/1.73m2    Calcium 7.9 (L) 8.5 - 10.1 MG/DL    Bilirubin, total 0.4 0.2 - 1.0 MG/DL    ALT (SGPT) 9 (L) 12 - 78 U/L    AST (SGOT) 19 15 - 37 U/L    Alk.  phosphatase 85 45 - 117 U/L    Protein, total 5.3 (L) 6.4 - 8.2 g/dL    Albumin 2.3 (L) 3.5 - 5.0 g/dL    Globulin 3.0 2.0 - 4.0 g/dL    A-G Ratio 0.8 (L) 1.1 - 2.2     PROTHROMBIN TIME + INR    Collection Time: 05/11/21  3:15 AM   Result Value Ref Range    INR 1.1 0.9 - 1.1      Prothrombin time 11.1 9.0 - 11.1 sec   PTT    Collection Time: 05/11/21  3:15 AM   Result Value Ref Range    aPTT 27.6 22.1 - 31.0 sec    aPTT, therapeutic range     58.0 - 77.0 SECS   PROCALCITONIN    Collection Time: 05/11/21  3:15 AM   Result Value Ref Range    Procalcitonin 112.37 ng/mL   C REACTIVE PROTEIN, QT    Collection Time: 05/11/21  3:15 AM   Result Value Ref Range    C-Reactive protein 7.45 (H) 0.00 - 0.60 mg/dL   SAMPLES BEING HELD    Collection Time: 05/11/21  3:15 AM   Result Value Ref Range    SAMPLES BEING HELD 1PST     COMMENT        Add-on orders for these samples will be processed based on acceptable specimen integrity and analyte stability, which may vary by analyte. SARS-COV-2    Collection Time: 05/11/21  3:19 AM   Result Value Ref Range    SARS-CoV-2 Please find results under separate order     SARS-COV-2    Collection Time: 05/11/21  3:19 AM   Result Value Ref Range    Specimen source Nasopharyngeal      SARS-CoV-2 Not detected NOTD         79 y/o with hx HTN, renal transplant, ESRD on HD admitted with hypotension and encephalopathy. Hypotension:  -- in setting of HD  -- pt endorsed missing a dose of midodrine  -- midodrine restarted this am.    -- if not improved after am cortisol tested will add stress dose steroids due to outpt steroid use, but anticipate rapid wean    Encephalopathy:  -- acute metabolic  -- seems to be resolved   -- no indication of CNS event or infection. Pneumonia:  -- opacity seen on CXR associated with effusion  -- suspect this was vol with some atelectasis, but could be pneumonia. -- low threshold to d/c vanc if nasal screen is neg.  -- doubt pseudomonal infection and would have low threshold to d/c zosyn too if improving. Pleural Effusion:  -- outpt f/u rec to ensure resolution. -- no indication for tap at this time.       Renal Transplant/ESRD on HD:  -- hx failed transplant  -- takes pred outpt  -- stress dose steroids considered but will hold off now that cortisol added to avoid interference with interpretation of lab. -- pt might have underlying adrenal insuff, but might be better to w/u outpt or when more stable. Cortisol levels tend not to correlate well with steroids responsiveness in setting of hypotension. CCM time 35 min. Pt at risk of life threatening deterioration from hypotension. Pt seen and evaluated via tele encounter. Audio and video used for this encounter.

## 2021-05-11 NOTE — ED NOTES
Report received from Shena Olivarez, [facility].  Assumed care of patient at this time

## 2021-05-11 NOTE — ED NOTES
Patient having episodes of stool incontinence, large, loose/watery stools, patient states he is anuric     Patient has recently been discharged from rehab facility following admission at [facility]    Dr Juan Manuel Warren made aware,does not want to check for Cdiff at this time

## 2021-05-11 NOTE — PROGRESS NOTES
Reason for Admission:  Hypotension,shock,lethargy  Isolation Precautions: Droplet Plus Covid PUI                   RUR Score:      15%               Plan for utilizing home health: To be determined    PCP: First and Last name:  Goran Warren NP     Name of Practice:    Are you a current patient: Yes/No: yes   Approximate date of last visit:    Can you participate in a virtual visit with your PCP: yes                    Current Advanced Directive/Advance Care Plan: Full Code      Healthcare Decision Maker:   Primary Emergency Contact: Kiersten Quigley  Address: [address] [place] [address] [address] [facility] Phone: [phone]  Mobile Phone: [phone]  Relation: Spouse                               Transition of Care Plan:                    Pt was admitted to [place] for hypotension and lethargy. Pt had been @ HD yesterday and had not taken his midodrine. After HD,pt was brought to the ED due to his hypotension requiring pressors and septic shock. Pt also has an infiltrate (LLL)    Typically,pt is talkative and interactive @ baseline. Pt is on a Monday/Wednesday/Friday @ [address]  I notified outpatient dialysis regarding pt.'s hospitalization. Past medical history includes:ESRD,BPH,renal transplant recipient,HTN,chronic anemia,gout,IBD,meningitis,s/p TURP    Assessment:  Home DME:rolling walker,tri-cane  Outpatient HD @ [facility] 12 M/W/Fr  Pt lives with his wife in a two story home with 7 entry steps and 13 steps to navigate from the first to second floors. Bedroom and bath are located on the second floor. Pt has Medicare [facility] as his secondary insurance. Pharmacy of choice is [email or web address]. Pt is typically independent of his ADLS. I am following pt for future discharge needs. Rapid Covid negative. Covid by PCR is pending    Rehan Sanchez  [phone]

## 2021-05-11 NOTE — PROGRESS NOTES
[facility] Dosing Services: Antimicrobial Stewardship Daily Doc    Consult for antibiotic dosing of vancomycin by Dr. Henny Ricketts  Indication: Septic shock, pneumonia in setting of ESRD on HD MWF  Day of Therapy: 1    Ht Readings from Last 1 Encounters:   05/11/21 177.8 cm (70\")        Wt Readings from Last 1 Encounters:   05/11/21 74.1 kg (163 lb 5.8 oz)     Vancomycin therapy:  Current maintenance dose: 500 mg IV after each HD session  Dose calculated to approximate a therapeutic trough of 15-20 mcg/mL. Assessment/Plan:  Received 1250 mg IV x 1 dose on 5/11 at 0009  No HD today, plan for HD tomorrow Wednesday 5/12 and give 500 mg IV after HD session is completed  Plan for level Friday AM if vancomycin continues  Dose administration notes:   Doses given appropriately as scheduled    Date Dose & Interval Measured (mcg/mL) Extrapolated (mcg/mL)   ? ? ? ?   ? ? ? ?   ? ? ? ? Other Antimicrobial   (not dosed by pharmacist) Zosyn 3.375 g IV every 12 hours   Cultures 5/10 - Blood - NGTD x 9 hours - Prelim  5/11 - Blood - NGTD x 4 hours - Prelim  5/11 - MRSA - Pending   Serum Creatinine Lab Results   Component Value Date/Time    Creatinine 4.46 (H) 05/11/2021 03:15 AM    Creatinine (POC) 3.4 (H) 03/01/2015 01:27 PM         Creatinine Clearance Estimated Creatinine Clearance: 16.4 mL/min (A) (based on SCr of 4.46 mg/dL (H)).      Temp Temp: 98.3 °F (36.8 °C)       WBC Lab Results   Component Value Date/Time    WBC 9.6 05/10/2021 04:54 PM        H/H Lab Results   Component Value Date/Time    HGB 13.3 05/10/2021 04:54 PM        Platelets    Lab Results   Component Value Date/Time    PLATELET 763 (L) 57/52/2024 04:54 PM          For Antifungals, Metronidazole, and Nafcillin:  ALT:        AST:      Alk Phos:      T Bili:    Pharmacist Christ Buerger, PHARMD Contact information:

## 2021-05-11 NOTE — PROGRESS NOTES
[address] [place] [address]  YOB: 1950          Assessment & Plan:   ESRD on HD MWF ([facility])  Hypotension, acute on chronic  Diarrhea  PNA   Recent cervical fusion at [facility] for cervical stenosis/myelopathy  History of failed renal transplant    Rec:  No acute indication HD. Had full tx yesterday  R/o COVID  Check cortisol  HD tomorrow and MWF  ICU support. On pressors and midodrine       Subjective:   CC: ESRD  HPI: Patient known to me with ESRD on HD MWF at [address] [place] [address]. Sent to ER p tx yesterday with weakness, low BP and ?diarrhea. LLL infiltrate on CXR here and requiring O2  ROS: Eval from outside room due to COVID r/o isolation  Current Facility-Administered Medications   Medication Dose Route Frequency    sodium chloride (NS) flush 5-40 mL  5-40 mL IntraVENous Q8H    sodium chloride (NS) flush 5-40 mL  5-40 mL IntraVENous PRN    acetaminophen (TYLENOL) tablet 650 mg  650 mg Oral Q4H PRN    melatonin (rapid dissolve) tablet 5 mg  5 mg Oral QHS PRN    Vancomycin - pharmacy to dose   Other Rx Dosing/Monitoring    morphine injection 2 mg  2 mg IntraVENous Q4H PRN    ondansetron (ZOFRAN) injection 6 mg  6 mg IntraVENous Q6H PRN    midodrine (PROAMATINE) tablet 10 mg  10 mg Oral TID    sodium chloride (NS) flush 5-10 mL  5-10 mL IntraVENous PRN    piperacillin-tazobactam (ZOSYN) 3.375 g in 0.9% sodium chloride (MBP/ADV) 100 mL MBP  3.375 g IntraVENous Q12H    NOREPINephrine (LEVOPHED) 8 mg in 5% dextrose 250mL (32 mcg/mL) infusion  0.5-16 mcg/min IntraVENous TITRATE          Objective:     Vitals:  Blood pressure 109/61, pulse 91, temperature 98.5 °F (36.9 °C), resp. rate 14, height 5' 10\" (1.778 m), weight 74.1 kg (163 lb 5.8 oz), SpO2 94 %.   Temp (24hrs), Av.4 °F (36.9 °C), Min:97.5 °F (36.4 °C), Max:99.5 °F (37.5 °C)      Intake and Output:   0701   In: 107.2 [I.V.:107.2]  Out: -   No intake/output data recorded. Physical Exam:               Appears ill  On NC O2, sl dyspneic          ECG/rhythm:    Data Review      No results for input(s): TNIPOC in the last 72 hours. No lab exists for component: ITNL   Recent Labs     05/10/21  2211   TROIQ 0.07*     Recent Labs     05/11/21  0315 05/10/21  2211 05/10/21  1654     --  138   K 3.5  --  3.2*     --  102   CO2 27  --  29   BUN 14  --  11   CREA 4.46*  --  3.82*   GLU 67  --  75   PHOS  --  1.6*  --    MG  --  1.4*  --    CA 7.9*  --  8.5   ALB 2.3*  --  3.1*   WBC  --   --  9.6   HGB  --   --  13.3   HCT  --   --  42.4   PLT  --   --  148*      Recent Labs     05/11/21 0315   INR 1.1   PTP 11.1   APTT 27.6     Needs: urine analysis, urine sodium, protein and creatinine  No results found for: ALCIDES GRAYSON        : Bambi Michael MD  5/11/2021        [facility]:  [email or web address]  Tylor Lange office:  [address] [place] [address], [address] [place] [address],[address]  [place], [address] [facility]  Phone: [phone]  Fax :     [phone]    [place] office:  [address] [facility], [address] [facility]  Phone - [phone]  Fax - [phone]

## 2021-05-11 NOTE — PROGRESS NOTES
Problem: Falls - Risk of  Goal: *Absence of Falls  Description: Document [place] [facility] Fall Risk and appropriate interventions in the flowsheet. Outcome: Progressing Towards Goal  Note: Fall Risk Interventions:            Medication Interventions: Bed/chair exit alarm, Patient to call before getting OOB    Elimination Interventions: Call light in reach, Patient to call for help with toileting needs              Problem: Patient Education: Go to Patient Education Activity  Goal: Patient/Family Education  Outcome: Progressing Towards Goal     Problem: Risk for Spread of Infection  Goal: Prevent transmission of infectious organism to others  Description: Prevent the transmission of infectious organisms to other patients, staff members, and visitors. Outcome: Progressing Towards Goal     Problem: Patient Education:  Go to Education Activity  Goal: Patient/Family Education  Outcome: Progressing Towards Goal     Problem: Pressure Injury - Risk of  Goal: *Prevention of pressure injury  Description: Document Tonio Scale and appropriate interventions in the flowsheet.   Outcome: Progressing Towards Goal  Note: Pressure Injury Interventions:  Sensory Interventions: Pressure redistribution bed/mattress (bed type)         Activity Interventions: Pressure redistribution bed/mattress(bed type)    Mobility Interventions: Pressure redistribution bed/mattress (bed type)    Nutrition Interventions: Document food/fluid/supplement intake    Friction and Shear Interventions: Minimize layers                Problem: Patient Education: Go to Patient Education Activity  Goal: Patient/Family Education  Outcome: Progressing Towards Goal

## 2021-05-12 ENCOUNTER — APPOINTMENT (OUTPATIENT)
Dept: INTERVENTIONAL RADIOLOGY/VASCULAR | Age: 71
DRG: 853 | End: 2021-05-12
Attending: INTERNAL MEDICINE
Payer: MEDICARE

## 2021-05-12 LAB
ANION GAP SERPL CALC-SCNC: 7 MMOL/L (ref 5–15)
BACTERIA SPEC CULT: NORMAL
BACTERIA SPEC CULT: NORMAL
BASOPHILS # BLD: 0 K/UL (ref 0–0.1)
BASOPHILS NFR BLD: 0 % (ref 0–1)
BUN SERPL-MCNC: 25 MG/DL (ref 6–20)
BUN/CREAT SERPL: 4 (ref 12–20)
CALCIUM SERPL-MCNC: 8.3 MG/DL (ref 8.5–10.1)
CHLORIDE SERPL-SCNC: 104 MMOL/L (ref 97–108)
CO2 SERPL-SCNC: 26 MMOL/L (ref 21–32)
CORTIS SERPL-MCNC: 4 UG/DL
CREAT SERPL-MCNC: 6.09 MG/DL (ref 0.7–1.3)
CRP SERPL-MCNC: 17 MG/DL (ref 0–0.6)
DIFFERENTIAL METHOD BLD: ABNORMAL
EOSINOPHIL # BLD: 0.3 K/UL (ref 0–0.4)
EOSINOPHIL NFR BLD: 2 % (ref 0–7)
ERYTHROCYTE [DISTWIDTH] IN BLOOD BY AUTOMATED COUNT: 15.4 % (ref 11.5–14.5)
GLUCOSE SERPL-MCNC: 79 MG/DL (ref 65–100)
HCT VFR BLD AUTO: 31.4 % (ref 36.6–50.3)
HGB BLD-MCNC: 9.6 G/DL (ref 12.1–17)
IMM GRANULOCYTES # BLD AUTO: 0.2 K/UL (ref 0–0.04)
IMM GRANULOCYTES NFR BLD AUTO: 1 % (ref 0–0.5)
LACTATE SERPL-SCNC: 0.7 MMOL/L (ref 0.4–2)
LYMPHOCYTES # BLD: 1.8 K/UL (ref 0.8–3.5)
LYMPHOCYTES NFR BLD: 11 % (ref 12–49)
MAGNESIUM SERPL-MCNC: 2.2 MG/DL (ref 1.6–2.4)
MCH RBC QN AUTO: 31.1 PG (ref 26–34)
MCHC RBC AUTO-ENTMCNC: 30.6 G/DL (ref 30–36.5)
MCV RBC AUTO: 101.6 FL (ref 80–99)
MONOCYTES # BLD: 0.5 K/UL (ref 0–1)
MONOCYTES NFR BLD: 3 % (ref 5–13)
NEUTS SEG # BLD: 13.4 K/UL (ref 1.8–8)
NEUTS SEG NFR BLD: 83 % (ref 32–75)
NRBC # BLD: 0 K/UL (ref 0–0.01)
NRBC BLD-RTO: 0 PER 100 WBC
PLATELET # BLD AUTO: 98 K/UL (ref 150–400)
PMV BLD AUTO: 12 FL (ref 8.9–12.9)
POTASSIUM SERPL-SCNC: 4.5 MMOL/L (ref 3.5–5.1)
RBC # BLD AUTO: 3.09 M/UL (ref 4.1–5.7)
RBC MORPH BLD: ABNORMAL
SERVICE CMNT-IMP: NORMAL
SODIUM SERPL-SCNC: 137 MMOL/L (ref 136–145)
WBC # BLD AUTO: 16.2 K/UL (ref 4.1–11.1)

## 2021-05-12 PROCEDURE — 86140 C-REACTIVE PROTEIN: CPT

## 2021-05-12 PROCEDURE — 83735 ASSAY OF MAGNESIUM: CPT

## 2021-05-12 PROCEDURE — 82533 TOTAL CORTISOL: CPT

## 2021-05-12 PROCEDURE — 77010033678 HC OXYGEN DAILY

## 2021-05-12 PROCEDURE — 74011250637 HC RX REV CODE- 250/637: Performed by: INTERNAL MEDICINE

## 2021-05-12 PROCEDURE — 74011250636 HC RX REV CODE- 250/636: Performed by: INTERNAL MEDICINE

## 2021-05-12 PROCEDURE — 65660000000 HC RM CCU STEPDOWN

## 2021-05-12 PROCEDURE — 86738 MYCOPLASMA ANTIBODY: CPT

## 2021-05-12 PROCEDURE — 83605 ASSAY OF LACTIC ACID: CPT

## 2021-05-12 PROCEDURE — 74011636637 HC RX REV CODE- 636/637: Performed by: INTERNAL MEDICINE

## 2021-05-12 PROCEDURE — 80048 BASIC METABOLIC PNL TOTAL CA: CPT

## 2021-05-12 PROCEDURE — 74011000258 HC RX REV CODE- 258: Performed by: EMERGENCY MEDICINE

## 2021-05-12 PROCEDURE — 85025 COMPLETE CBC W/AUTO DIFF WBC: CPT

## 2021-05-12 PROCEDURE — 74011250636 HC RX REV CODE- 250/636: Performed by: EMERGENCY MEDICINE

## 2021-05-12 PROCEDURE — 36415 COLL VENOUS BLD VENIPUNCTURE: CPT

## 2021-05-12 RX ORDER — LIDOCAINE HYDROCHLORIDE 10 MG/ML
10-30 INJECTION INFILTRATION; PERINEURAL
Status: DISCONTINUED | OUTPATIENT
Start: 2021-05-12 | End: 2021-05-13 | Stop reason: HOSPADM

## 2021-05-12 RX ORDER — HEPARIN SODIUM 1000 [USP'U]/ML
2500 INJECTION, SOLUTION INTRAVENOUS; SUBCUTANEOUS
Status: DISCONTINUED | OUTPATIENT
Start: 2021-05-12 | End: 2021-05-13 | Stop reason: HOSPADM

## 2021-05-12 RX ADMIN — MEGESTROL ACETATE 40 MG: 40 TABLET ORAL at 10:07

## 2021-05-12 RX ADMIN — PIPERACILLIN AND TAZOBACTAM 3.38 G: 3; .375 INJECTION, POWDER, LYOPHILIZED, FOR SOLUTION INTRAVENOUS at 11:59

## 2021-05-12 RX ADMIN — EPOETIN ALFA-EPBX 10000 UNITS: 10000 INJECTION, SOLUTION INTRAVENOUS; SUBCUTANEOUS at 20:54

## 2021-05-12 RX ADMIN — MIDODRINE HYDROCHLORIDE 10 MG: 5 TABLET ORAL at 17:45

## 2021-05-12 RX ADMIN — MIDODRINE HYDROCHLORIDE 10 MG: 5 TABLET ORAL at 08:00

## 2021-05-12 RX ADMIN — MEGESTROL ACETATE 40 MG: 40 TABLET ORAL at 17:15

## 2021-05-12 RX ADMIN — HEPARIN SODIUM 5000 UNITS: 5000 INJECTION INTRAVENOUS; SUBCUTANEOUS at 22:24

## 2021-05-12 RX ADMIN — MORPHINE SULFATE 2 MG: 2 INJECTION, SOLUTION INTRAMUSCULAR; INTRAVENOUS at 17:15

## 2021-05-12 RX ADMIN — Medication 10 ML: at 16:22

## 2021-05-12 RX ADMIN — Medication 10 ML: at 21:01

## 2021-05-12 RX ADMIN — HEPARIN SODIUM 5000 UNITS: 5000 INJECTION INTRAVENOUS; SUBCUTANEOUS at 06:18

## 2021-05-12 RX ADMIN — Medication 10 ML: at 06:18

## 2021-05-12 RX ADMIN — PREDNISONE 5 MG: 5 TABLET ORAL at 08:00

## 2021-05-12 RX ADMIN — MORPHINE SULFATE 2 MG: 2 INJECTION, SOLUTION INTRAMUSCULAR; INTRAVENOUS at 11:59

## 2021-05-12 RX ADMIN — HEPARIN SODIUM 5000 UNITS: 5000 INJECTION INTRAVENOUS; SUBCUTANEOUS at 16:22

## 2021-05-12 RX ADMIN — PIPERACILLIN AND TAZOBACTAM 3.38 G: 3; .375 INJECTION, POWDER, LYOPHILIZED, FOR SOLUTION INTRAVENOUS at 22:26

## 2021-05-12 RX ADMIN — MORPHINE SULFATE 2 MG: 2 INJECTION, SOLUTION INTRAMUSCULAR; INTRAVENOUS at 06:35

## 2021-05-12 RX ADMIN — FAMOTIDINE 20 MG: 10 INJECTION INTRAVENOUS at 17:15

## 2021-05-12 RX ADMIN — MIDODRINE HYDROCHLORIDE 10 MG: 5 TABLET ORAL at 21:01

## 2021-05-12 RX ADMIN — NEPHROCAP 1 CAPSULE: 1 CAP ORAL at 08:00

## 2021-05-12 RX ADMIN — TIMOLOL MALEATE 1 DROP: 5 SOLUTION OPHTHALMIC at 10:08

## 2021-05-12 NOTE — DIALYSIS
0700: Attempted to initiate dialysis. No bruit or thrill upon initial assessment. Asked Primary RN to assess behind me. She stated she heard a faint bruit. Primary RN stated that per report, pt arrived to hospital with [address] accessed and EMS was using for IV fluids. I did attempt to access the venous site with 15g needle. No blood return. Dr. Pauline Combs notified at 0441 and made aware of findings. Per Dr. Pauline Combs an order for a Temporary dialysis catheter was placed. IR was notified and stated that they did not have a time to give me for placing catheter. Pt made aware of issues with his Graft and Doctors order/plan to get dialysis catheter placed. I will touch base later this morning for estimated time.

## 2021-05-12 NOTE — PROGRESS NOTES
0700- Bedside and Verbal shift change report given to Aaron Patrick RN (oncoming nurse) by Jamshid Nick RN (offgoing nurse). Report included the following information SBAR, Kardex, Intake/Output and Cardiac Rhythm NSR. Primary Nurse Hilario Ortega RN and Peri Barton RN performed a dual skin assessment on this patient No impairment noted  Nydia RN in room setting up for scheduled HD.   0730- Nydia RN asked this RN to come in room to assess patency of patient's fistula, as she could not hear bruit or feel thrill. Faint bruit was auscultated, but thrill not palpable. Per Nydia RN, she will attempt to access site after her own assessment. 18- Notified by [facility] RN that she is not able to access patient's fistula and per Dr. Quique Juarez, he would like to have a temporary access placed. Nydia RN contacting IR.    0800- Assessment completed, see doc flowsheet. Patient assisted to reposition in bed. RN ensured that patient understood why HD was not started, and that he would need a temporary dialysis catheter, patient verbalized understanding. [facility]- Dr. Renuka Chand in room to assess patient via telehealth monitor. Per Dr. Renuka Chand, patient no longer needs to be ICU status. 1000- Patient assisted to reposition in bed.    1200- Reassessment completed, see doc flowsheet. Patient assisted to turn and reposition in bed. 1400- Patient assisted to turn and reposition in bed. 1545- IR at bedside to place temporary HD catheter. Per Connie Macias IR RN, patient requesting his graft to be \"cleaned out\" instead of having temporary catheter placed. Dr. Trace Avila of vascular surgery also at bedside and stated that he plans to clean graft out tomorrow in IR and that he will contact Dr. Quique Juarez of nephro to update on plan. 1600- Reassessment completed, see doc flowsheet. Patient assisted to reposition in bed.      1800- Patient assisted to reposition in bed.    1900- Bedside and Verbal shift change report given to Clarita Gastelum (oncoming nurse) by Billy Shi RN (offgoing nurse). Report included the following information SBAR, Kardex, Intake/Output and Cardiac Rhythm NSR.

## 2021-05-12 NOTE — PROGRESS NOTES
FLOR note:    RUR 22%-moderate risk for readmission    Healthcare Decision Maker:   Primary Emergency Contact: Kiersten Quigley  Address: [address] [facility]  Shailesh [address] [facility] Phone: [phone]  Mobile Phone: [phone]  Relation: Spouse      Pt is on a Monday/Wednesday/Friday @ [address] [facility] walker,tri-cane  Pt lives with his wife in a two story home with 7 entry steps and 13 steps to navigate from the first to second floors. Bedroom and bath are located on the second floor. I am continuing to follow pt for future discharge needs.     Martita Tyler

## 2021-05-12 NOTE — DIALYSIS
Spoke to IR at 1250. IR stated that they had an emergent case and it would be hours before putting in temporary dialysis catheter. Dr. [facility] made aware. Abbey Garnica made aware.

## 2021-05-12 NOTE — ROUTINE PROCESS
Came to patient's bedside to place temporary dialysis catheter. Patient requesting his graft to be assessed. Vascular Surgeon Stefania Mendoza at the bedside stating plan to clean graft tomorrow in interventional radiology. Patient agreeable with procedure. Dr. Charlie Stewart states he will contact Dr. Kathy Huang concerning the timing of dialysis. Interventional Radiologist updated. Patient agreeable with plan. Bedside nurse updated.

## 2021-05-12 NOTE — PROGRESS NOTES
Spiritual Care Assessment/Progress Note  [address]      NAME: Jessica Osman      MRN: [id number]  AGE: 78 y.o. SEX: male  Jainism Affiliation: Non Voodoo   Language: English     5/12/2021     Total Time (in minutes): 5     Spiritual Assessment begun in [facility] 3 INTENSIVE CARE through conversation with:         []Patient        [] Family    [] Friend(s)        Reason for Consult: Initial/Spiritual assessment, critical care     Spiritual beliefs: (Please include comment if needed)     [] Identifies with a janelle tradition:         [] Supported by a janelle community:            [] Claims no spiritual orientation:           [] Seeking spiritual identity:                [] Adheres to an individual form of spirituality:           [x] Not able to assess:                           Identified resources for coping:      [] Prayer                               [] Music                  [] Guided Imagery     [] Family/friends                 [] Pet visits     [] Devotional reading                         [x] Unknown     [] Other:                                              Interventions offered during this visit: (See comments for more details)    Patient Interventions: Other (comment)           Plan of Care:     [] Support spiritual and/or cultural needs    [] Support AMD and/or advance care planning process      [] Support grieving process   [] Coordinate Rites and/or Rituals    [] Coordination with community clergy   [] No spiritual needs identified at this time   [] Detailed Plan of Care below (See Comments)  [] Make referral to Music Therapy  [] Make referral to Pet Therapy     [] Make referral to Addiction services  [] Make referral to [facility]  [] Make referral to Spiritual Care Partner  [] No future visits requested        [x] Follow up upon further referrals     Comments:  visited Mr. Lamona Lennox for an initial spiritual care assessment in the ICU.  Mr. Lamona Lennox was lying in bed and appeared to be resting comfortably.  did not disturb him at this time. 's are available for further support upon referral  Lorelei Joseph. Cintia Chadwick.      Paging Service: [phone])

## 2021-05-12 NOTE — PROGRESS NOTES
Problem: Falls - Risk of  Goal: *Absence of Falls  Description: Document Lilton Broad Fall Risk and appropriate interventions in the flowsheet. Outcome: Progressing Towards Goal  Note: Fall Risk Interventions:       Mentation Interventions: Adequate sleep, hydration, pain control, Door open when patient unattended, Evaluate medications/consider consulting pharmacy    Medication Interventions: Bed/chair exit alarm    Elimination Interventions: Bed/chair exit alarm, Patient to call for help with toileting needs              Problem: Patient Education: Go to Patient Education Activity  Goal: Patient/Family Education  Outcome: Progressing Towards Goal     Problem: Risk for Spread of Infection  Goal: Prevent transmission of infectious organism to others  Description: Prevent the transmission of infectious organisms to other patients, staff members, and visitors. Outcome: Progressing Towards Goal     Problem: Patient Education:  Go to Education Activity  Goal: Patient/Family Education  Outcome: Progressing Towards Goal     Problem: Pressure Injury - Risk of  Goal: *Prevention of pressure injury  Description: Document Tonio Scale and appropriate interventions in the flowsheet. Outcome: Progressing Towards Goal  Note: Pressure Injury Interventions:  Sensory Interventions: Assess changes in LOC, Assess need for specialty bed, Avoid rigorous massage over bony prominences, Check visual cues for pain, Discuss PT/OT consult with provider, Float heels, Keep linens dry and wrinkle-free         Activity Interventions: Assess need for specialty bed, Increase time out of bed, Pressure redistribution bed/mattress(bed type)    Mobility Interventions: Assess need for specialty bed, Pressure redistribution bed/mattress (bed type), Turn and reposition approx.  every two hours(pillow and wedges)    Nutrition Interventions: Document food/fluid/supplement intake    Friction and Shear Interventions: Apply protective barrier, creams and emollients, Foam dressings/transparent film/skin sealants, Lift team/patient mobility team, Transferring/repositioning devices                Problem: Patient Education: Go to Patient Education Activity  Goal: Patient/Family Education  Outcome: Progressing Towards Goal

## 2021-05-12 NOTE — PROGRESS NOTES
[address] [facility]  YOB: 1950          Assessment & Plan:   ESRD on HD MWF ([facility], [facility])  Hypotension, acute on chronic  Diarrhea  PNA   Recent cervical fusion at [facility] for cervical stenosis/myelopathy  History of failed renal transplant  Clotted AVF  Anemia    Rec:  Place line and HD later today  EPO  Vascular consult re clotted graft. Appreciate Dr. Bárbara Terrazas assistance       Subjective:   CC: ESRD  HPI: Off pressors. COVID neg. AVG is clotted.   ROS: Denies sob/n/v  Current Facility-Administered Medications   Medication Dose Route Frequency    lidocaine (XYLOCAINE) 10 mg/mL (1 %) injection 10-30 mL  10-30 mL SubCUTAneous Multiple    heparin (porcine) 1,000 unit/mL injection 2,500 Units  2,500 Units IntraVENous Multiple    sodium chloride (NS) flush 5-40 mL  5-40 mL IntraVENous Q8H    sodium chloride (NS) flush 5-40 mL  5-40 mL IntraVENous PRN    acetaminophen (TYLENOL) tablet 650 mg  650 mg Oral Q4H PRN    melatonin (rapid dissolve) tablet 5 mg  5 mg Oral QHS PRN    Vancomycin - pharmacy to dose   Other Rx Dosing/Monitoring    morphine injection 2 mg  2 mg IntraVENous Q4H PRN    ondansetron (ZOFRAN) injection 6 mg  6 mg IntraVENous Q6H PRN    midodrine (PROAMATINE) tablet 10 mg  10 mg Oral TID    heparin (porcine) injection 5,000 Units  5,000 Units SubCUTAneous Q8H    famotidine (PF) (PEPCID) 20 mg in 0.9% sodium chloride 10 mL injection  20 mg IntraVENous QPM    B complex-vitaminC-folic acid (NEPHROCAP) cap  1 Cap Oral DAILY    megestroL (MEGACE) tablet 40 mg  40 mg Oral BID    timolol (TIMOPTIC) 0.5 % ophthalmic solution 1 Drop  1 Drop Left Eye DAILY    predniSONE (DELTASONE) tablet 5 mg  5 mg Oral DAILY WITH BREAKFAST    sodium chloride (NS) flush 5-10 mL  5-10 mL IntraVENous PRN    piperacillin-tazobactam (ZOSYN) 3.375 g in 0.9% sodium chloride (MBP/ADV) 100 mL MBP  3.375 g IntraVENous Q12H    NOREPINephrine (LEVOPHED) 8 mg in 5% dextrose 250mL (32 mcg/mL) infusion  0.5-16 mcg/min IntraVENous TITRATE          Objective:     Vitals:  Blood pressure (!) 117/56, pulse 73, temperature 98.9 °F (37.2 °C), resp. rate 14, height 5' 10\" (1.778 m), weight 74.1 kg (163 lb 5.8 oz), SpO2 92 %. Temp (24hrs), Av.7 °F (37.1 °C), Min:98.3 °F (36.8 °C), Max:98.9 °F (37.2 °C)      Intake and Output:  No intake/output data recorded. 05/10 1901 -  0700  In: 477.1 [I.V.:477.1]  Out: -     Physical Exam:               Appears ill  CV s1s2  Lungs clear, on oxygen  Ext min edema  AVG LUE not patent          ECG/rhythm:    Data Review      No results for input(s): TNIPOC in the last 72 hours. No lab exists for component: ITNL   Recent Labs     05/10/21  2211   TROIQ 0.07*     Recent Labs     21  0520 215 05/10/21  2211 05/10/21  1654    139  --  138   K 4.5 3.5  --  3.2*    106  --  102   CO2 26 27  --  29   BUN 25* 14  --  11   CREA 6.09* 4.46*  --  3.82*   GLU 79 67  --  75   PHOS  --   --  1.6*  --    MG 2.2  --  1.4*  --    CA 8.3* 7.9*  --  8.5   ALB  --  2.3*  --  3.1*   WBC 16.2*  --   --  9.6   HGB 9.6*  --   --  13.3   HCT 31.4*  --   --  42.4   PLT 98*  --   --  148*      Recent Labs     21   INR 1.1   PTP 11.1   APTT 27.6     Needs: urine analysis, urine sodium, protein and creatinine  No results found for: ALCIDES GRAYSON        : Adam Cortez MD  2021        [facility]:  [email or web address]  Dejan Sergio office:  [address], [address] [place] [address],[address]  [place], [address] [place] [address]  Phone: [phone]  Fax :     [phone]    [place] office:  [address] [facility], [facility] [address]   Phone - [phone]  Fax - [phone]

## 2021-05-13 ENCOUNTER — APPOINTMENT (OUTPATIENT)
Dept: VASCULAR SURGERY | Age: 71
DRG: 853 | End: 2021-05-13
Attending: INTERNAL MEDICINE
Payer: MEDICARE

## 2021-05-13 LAB
ANION GAP SERPL CALC-SCNC: 7 MMOL/L (ref 5–15)
BASOPHILS # BLD: 0.1 K/UL (ref 0–0.1)
BASOPHILS NFR BLD: 1 % (ref 0–1)
BUN SERPL-MCNC: 36 MG/DL (ref 6–20)
BUN/CREAT SERPL: 5 (ref 12–20)
CALCIUM SERPL-MCNC: 7.8 MG/DL (ref 8.5–10.1)
CHLORIDE SERPL-SCNC: 104 MMOL/L (ref 97–108)
CO2 SERPL-SCNC: 26 MMOL/L (ref 21–32)
CREAT SERPL-MCNC: 7.44 MG/DL (ref 0.7–1.3)
DIFFERENTIAL METHOD BLD: ABNORMAL
EOSINOPHIL # BLD: 0.4 K/UL (ref 0–0.4)
EOSINOPHIL NFR BLD: 3 % (ref 0–7)
ERYTHROCYTE [DISTWIDTH] IN BLOOD BY AUTOMATED COUNT: 15.5 % (ref 11.5–14.5)
GLUCOSE SERPL-MCNC: 100 MG/DL (ref 65–100)
HCT VFR BLD AUTO: 31.5 % (ref 36.6–50.3)
HGB BLD-MCNC: 9.7 G/DL (ref 12.1–17)
IMM GRANULOCYTES # BLD AUTO: 0.1 K/UL (ref 0–0.04)
IMM GRANULOCYTES NFR BLD AUTO: 1 % (ref 0–0.5)
LYMPHOCYTES # BLD: 1.9 K/UL (ref 0.8–3.5)
LYMPHOCYTES NFR BLD: 15 % (ref 12–49)
MCH RBC QN AUTO: 30.7 PG (ref 26–34)
MCHC RBC AUTO-ENTMCNC: 30.8 G/DL (ref 30–36.5)
MCV RBC AUTO: 99.7 FL (ref 80–99)
MONOCYTES # BLD: 0.6 K/UL (ref 0–1)
MONOCYTES NFR BLD: 5 % (ref 5–13)
NEUTS SEG # BLD: 9.7 K/UL (ref 1.8–8)
NEUTS SEG NFR BLD: 76 % (ref 32–75)
NRBC # BLD: 0 K/UL (ref 0–0.01)
NRBC BLD-RTO: 0 PER 100 WBC
PLATELET # BLD AUTO: 112 K/UL (ref 150–400)
PMV BLD AUTO: 11.8 FL (ref 8.9–12.9)
POTASSIUM SERPL-SCNC: 4.2 MMOL/L (ref 3.5–5.1)
PROCALCITONIN SERPL-MCNC: 190.94 NG/ML
RBC # BLD AUTO: 3.16 M/UL (ref 4.1–5.7)
SODIUM SERPL-SCNC: 137 MMOL/L (ref 136–145)
WBC # BLD AUTO: 12.7 K/UL (ref 4.1–11.1)

## 2021-05-13 PROCEDURE — 77030029065 HC DRSG HEMO QCLOT ZMED -B

## 2021-05-13 PROCEDURE — 90935 HEMODIALYSIS ONE EVALUATION: CPT

## 2021-05-13 PROCEDURE — 74011250636 HC RX REV CODE- 250/636: Performed by: EMERGENCY MEDICINE

## 2021-05-13 PROCEDURE — 74011636637 HC RX REV CODE- 636/637: Performed by: INTERNAL MEDICINE

## 2021-05-13 PROCEDURE — 74011000636 HC RX REV CODE- 636

## 2021-05-13 PROCEDURE — 85025 COMPLETE CBC W/AUTO DIFF WBC: CPT

## 2021-05-13 PROCEDURE — C1894 INTRO/SHEATH, NON-LASER: HCPCS

## 2021-05-13 PROCEDURE — 74011250636 HC RX REV CODE- 250/636

## 2021-05-13 PROCEDURE — 77030019698 HC SYR ANGI MDLON MRTM -A

## 2021-05-13 PROCEDURE — 77030003390 HC NDL ANGI MRTM -A

## 2021-05-13 PROCEDURE — 74011000258 HC RX REV CODE- 258: Performed by: EMERGENCY MEDICINE

## 2021-05-13 PROCEDURE — 80048 BASIC METABOLIC PNL TOTAL CA: CPT

## 2021-05-13 PROCEDURE — 74011250637 HC RX REV CODE- 250/637: Performed by: INTERNAL MEDICINE

## 2021-05-13 PROCEDURE — C1769 GUIDE WIRE: HCPCS

## 2021-05-13 PROCEDURE — 74011250636 HC RX REV CODE- 250/636: Performed by: INTERNAL MEDICINE

## 2021-05-13 PROCEDURE — 77030004561 HC CATH ANGI DX COBRA ANGI -B

## 2021-05-13 PROCEDURE — C1750 CATH, HEMODIALYSIS,LONG-TERM: HCPCS

## 2021-05-13 PROCEDURE — 76937 US GUIDE VASCULAR ACCESS: CPT

## 2021-05-13 PROCEDURE — 36415 COLL VENOUS BLD VENIPUNCTURE: CPT

## 2021-05-13 PROCEDURE — 74011000250 HC RX REV CODE- 250

## 2021-05-13 PROCEDURE — 5A1D70Z PERFORMANCE OF URINARY FILTRATION, INTERMITTENT, LESS THAN 6 HOURS PER DAY: ICD-10-PCS

## 2021-05-13 PROCEDURE — C1757 CATH, THROMBECTOMY/EMBOLECT: HCPCS

## 2021-05-13 PROCEDURE — 77030031139 HC SUT VCRL2 J&J -A

## 2021-05-13 PROCEDURE — 36905 THRMBC/NFS DIALYSIS CIRCUIT: CPT

## 2021-05-13 PROCEDURE — 36558 INSERT TUNNELED CV CATH: CPT

## 2021-05-13 PROCEDURE — 77030010507 HC ADH SKN DERMBND J&J -B

## 2021-05-13 PROCEDURE — 77030002986 HC SUT PROL J&J -A

## 2021-05-13 PROCEDURE — 99152 MOD SED SAME PHYS/QHP 5/>YRS: CPT

## 2021-05-13 PROCEDURE — 84145 PROCALCITONIN (PCT): CPT

## 2021-05-13 PROCEDURE — 65660000000 HC RM CCU STEPDOWN

## 2021-05-13 PROCEDURE — 93971 EXTREMITY STUDY: CPT

## 2021-05-13 PROCEDURE — 76080 X-RAY EXAM OF FISTULA: CPT

## 2021-05-13 PROCEDURE — 74011250637 HC RX REV CODE- 250/637: Performed by: HOSPITALIST

## 2021-05-13 DEVICE — IMPLANTABLE DEVICE: Type: IMPLANTABLE DEVICE | Status: FUNCTIONAL

## 2021-05-13 RX ORDER — FENTANYL CITRATE 50 UG/ML
INJECTION, SOLUTION INTRAMUSCULAR; INTRAVENOUS AS NEEDED
Status: DISCONTINUED | OUTPATIENT
Start: 2021-05-13 | End: 2021-05-13 | Stop reason: HOSPADM

## 2021-05-13 RX ORDER — HEPARIN SODIUM 200 [USP'U]/100ML
INJECTION, SOLUTION INTRAVENOUS
Status: COMPLETED | OUTPATIENT
Start: 2021-05-13 | End: 2021-05-13

## 2021-05-13 RX ORDER — MIDODRINE HYDROCHLORIDE 5 MG/1
5 TABLET ORAL 3 TIMES DAILY
Status: DISCONTINUED | OUTPATIENT
Start: 2021-05-13 | End: 2021-05-13

## 2021-05-13 RX ORDER — HEPARIN SODIUM 1000 [USP'U]/ML
INJECTION, SOLUTION INTRAVENOUS; SUBCUTANEOUS AS NEEDED
Status: DISCONTINUED | OUTPATIENT
Start: 2021-05-13 | End: 2021-05-13 | Stop reason: HOSPADM

## 2021-05-13 RX ORDER — LIDOCAINE HYDROCHLORIDE 10 MG/ML
INJECTION INFILTRATION; PERINEURAL AS NEEDED
Status: DISCONTINUED | OUTPATIENT
Start: 2021-05-13 | End: 2021-05-13 | Stop reason: HOSPADM

## 2021-05-13 RX ORDER — MIDAZOLAM HYDROCHLORIDE 1 MG/ML
INJECTION, SOLUTION INTRAMUSCULAR; INTRAVENOUS AS NEEDED
Status: DISCONTINUED | OUTPATIENT
Start: 2021-05-13 | End: 2021-05-13 | Stop reason: HOSPADM

## 2021-05-13 RX ORDER — MIDODRINE HYDROCHLORIDE 5 MG/1
2.5 TABLET ORAL 3 TIMES DAILY
Status: DISCONTINUED | OUTPATIENT
Start: 2021-05-13 | End: 2021-05-14

## 2021-05-13 RX ORDER — HEPARIN SODIUM 1000 [USP'U]/ML
3800 INJECTION, SOLUTION INTRAVENOUS; SUBCUTANEOUS
Status: DISCONTINUED | OUTPATIENT
Start: 2021-05-13 | End: 2021-05-15 | Stop reason: HOSPADM

## 2021-05-13 RX ADMIN — HEPARIN SODIUM 3800 UNITS: 1000 INJECTION INTRAVENOUS; SUBCUTANEOUS at 17:46

## 2021-05-13 RX ADMIN — MORPHINE SULFATE 2 MG: 2 INJECTION, SOLUTION INTRAMUSCULAR; INTRAVENOUS at 14:54

## 2021-05-13 RX ADMIN — MEGESTROL ACETATE 40 MG: 40 TABLET ORAL at 08:19

## 2021-05-13 RX ADMIN — PIPERACILLIN AND TAZOBACTAM 3.38 G: 3; .375 INJECTION, POWDER, LYOPHILIZED, FOR SOLUTION INTRAVENOUS at 22:02

## 2021-05-13 RX ADMIN — ACETAMINOPHEN 650 MG: 325 TABLET ORAL at 17:23

## 2021-05-13 RX ADMIN — PIPERACILLIN AND TAZOBACTAM 3.38 G: 3; .375 INJECTION, POWDER, LYOPHILIZED, FOR SOLUTION INTRAVENOUS at 10:38

## 2021-05-13 RX ADMIN — PREDNISONE 5 MG: 5 TABLET ORAL at 08:19

## 2021-05-13 RX ADMIN — MEGESTROL ACETATE 40 MG: 40 TABLET ORAL at 17:23

## 2021-05-13 RX ADMIN — MORPHINE SULFATE 2 MG: 2 INJECTION, SOLUTION INTRAMUSCULAR; INTRAVENOUS at 22:02

## 2021-05-13 RX ADMIN — NEPHROCAP 1 CAPSULE: 1 CAP ORAL at 08:19

## 2021-05-13 RX ADMIN — MIDODRINE HYDROCHLORIDE 2.5 MG: 5 TABLET ORAL at 22:13

## 2021-05-13 RX ADMIN — Medication 10 ML: at 13:25

## 2021-05-13 RX ADMIN — HEPARIN SODIUM 5000 UNITS: 5000 INJECTION INTRAVENOUS; SUBCUTANEOUS at 22:02

## 2021-05-13 RX ADMIN — Medication 10 ML: at 05:30

## 2021-05-13 RX ADMIN — Medication 10 ML: at 22:02

## 2021-05-13 RX ADMIN — HEPARIN SODIUM 5000 UNITS: 5000 INJECTION INTRAVENOUS; SUBCUTANEOUS at 14:54

## 2021-05-13 RX ADMIN — TIMOLOL MALEATE 1 DROP: 5 SOLUTION OPHTHALMIC at 08:24

## 2021-05-13 RX ADMIN — MORPHINE SULFATE 2 MG: 2 INJECTION, SOLUTION INTRAMUSCULAR; INTRAVENOUS at 03:45

## 2021-05-13 RX ADMIN — HEPARIN SODIUM 5000 UNITS: 5000 INJECTION INTRAVENOUS; SUBCUTANEOUS at 08:19

## 2021-05-13 RX ADMIN — MIDODRINE HYDROCHLORIDE 2.5 MG: 5 TABLET ORAL at 15:28

## 2021-05-13 RX ADMIN — FAMOTIDINE 20 MG: 10 INJECTION INTRAVENOUS at 17:22

## 2021-05-13 RX ADMIN — MIDODRINE HYDROCHLORIDE 5 MG: 5 TABLET ORAL at 08:19

## 2021-05-13 NOTE — PROGRESS NOTES
0710:Bedside and Verbal shift change report given to manfred chan (oncoming nurse) by Jennifer Greenberg (offgoing nurse). Report included the following information SBAR, Kardex, ED Summary, Procedure Summary, Intake/Output, Recent Results, Cardiac Rhythm SB,SR WITH BBB and Alarm Parameters . Primary Nurse Aries Miguel and Madeleine Kong , RN performed a dual skin assessment on this patient No impairment noted  0800:assessment done . Please see the flow sheet . Instructed him NPO for the procedure and pt verbalized understanding . 0900:given due medication with sips of water . Pt not c/o cough , not able to produce any sputum . Unable to get the sputum culture at this time . 1000:resting in bed . denies any distress . Call light with in the reach of pt .   1100:hung iv abx and pt going to IR  Via  . Pt a,a,a. o x 3 . Denies any distress or pain at this time   1330:pt received in room from IR. Pt a. a.ox3 but drowsy. LUE graft not working per [employer] per Zdorovio from [employer] . Placed LIJ by dr Froylan Sims . Dressing intact . HD nurse at bedside to do dialysis. Connected him to the cardiac monitor  . Pt denies any distress or pain at this time . Call light with in the reach of pt.   1400:resting in bed . Ate lunch   1600:resting in bed . HD on going    1800:pt done with HD , tolerated well . Pt refused to OOB at this time . Call light with with in the reach of pt . Pt refused to eat dinner . He said he waiting for his wife and she is on the way. Keep th tray at bedside . 1910:Bedside and Verbal shift change report given to laila chan   (oncoming nurse) by Terry Valentine (offgoing nurse). Report included the following information SBAR, Kardex, ED Summary, Procedure Summary, Intake/Output, Recent Results, Cardiac Rhythm SR,BBB and Alarm Parameters .

## 2021-05-13 NOTE — PROGRESS NOTES
Attempted thrombectomy  Graft calcified with multiple Pseudoaneurysms. Has extended past its useful life.   Placed LIJ tunneled line  Will need permanent access which can be arranged as an outpatient

## 2021-05-13 NOTE — PROCEDURES
[facility] Dialysis Team [facility]  [phone]    Vitals   Pre   Post   Assessment   Pre   Post     Temp  Temp: 98.1 °F (36.7 °C) (05/13/21 1330)  98.3 LOC  A/ox4 No change   HR   Pulse (Heart Rate): 65 (05/13/21 1330) 67 Lungs   clear No change   B/P   BP: (!) 165/79 (05/13/21 1330) 161/87 Cardiac   Paced rhythem. Bedside monitor No change   Resp   Resp Rate: 11 (05/13/21 1330) 13 Skin   Warm and dry  no change   Pain level  Pain Intensity 1: 0 (05/13/21 1330) 5-meds given per primary nurse Edema  none   No change   Orders:    Duration:   Start:    4060 End:    1724 Total:   3.5   Dialyzer:   Dialyzer/Set Up Inspection: Revaclear (05/13/21 1345)   K Bath:   Dialysate K (mEq/L): 3 (05/13/21 1345)   Ca Bath:   Dialysate CA (mEq/L): 2.5 (05/13/21 1345)   Na/Bicarb:   Dialysate NA (mEq/L): 138 (05/13/21 1345)   Target Fluid Removal:   Goal/Amount of Fluid to Remove (mL): 2000 mL (05/13/21 1345)   Access     Type & Location:   Left tunneled CVC   Labs     Obtained/Reviewed   Critical Results Called   Date when labs were drawn-  Hgb-    HGB   Date Value Ref Range Status   05/13/2021 9.7 (L) 12.1 - 17.0 g/dL Final     K-    Potassium   Date Value Ref Range Status   05/13/2021 4.2 3.5 - 5.1 mmol/L Final     Ca-   Calcium   Date Value Ref Range Status   05/13/2021 7.8 (L) 8.5 - 10.1 MG/DL Final     Bun-   BUN   Date Value Ref Range Status   05/13/2021 36 (H) 6 - 20 MG/DL Final     Creat-   Creatinine   Date Value Ref Range Status   05/13/2021 7.44 (H) 0.70 - 1.30 MG/DL Final        Medications/ Blood Products Given     Name   Dose   Route and Time     Heparin 1:1000 1.9ml & 1.9ml  Lock flush             Blood Volume Processed (BVP):    68.7 Net Fluid   Removed:  1700mls   Comments   Time Out Done: Yes  Primary Nurse Rpt Pre: Clementine Ardon, RN  Primary Nurse Rpt Post: Clementine Ardon RN  Pt Education: procedural/catheter care  Care Plan:  Ongoing-today and MWF  Tx Summary:  Dressing CDI. No s/s of infection.  Both lumens aspirate & flush well. Running well at . SBAR received from Primary RN. Consent signed & on file. Per primary RN Angio was not successful in declotting Graft so tunneled catheter placed. Upon arrival to pt room. Angio informed me that the doctor did place balloons but felt that it wouldn't work. No bruit or thrill felt. 1345: Each catheter limb disinfected per p&p, caps removed, hubs disinfected per p&p. Each lumen aspirated for blood return and flushed with Normal Saline per policy. . VSS. Dialysis Tx initiated 1354   2 min into treatment, air in lines could not be cleared out. Pt rinsed back. New set up done. 1724: Tx ended. VSS. Each dialysis catheter limb disinfected per p&p, all possible blood returned per p&p, and each dialysis hub disinfected per p&p. Each lumen flushed, post dialysis catheter Heparin dwell instilled per order, and caps applied. Active bleeding from new permacath site. Dressing changed/quiclot applied and transparent dressing used to cover site. Primary nurse made aware of bleeding. Encouraged her to keep an eye on it. If bleeding stops, dressing needs to be changed with biopatch used. Primary nurse verbally understood. Bed locked and in the lowest position, call bell and belongings in reach. SBAR given to Primary, RN. Patient is stable at time of my departure. All Dialysis related medications have been reviewed. Admiting Diagnosis:  Pt's previous clinic-[facility]  Consent signed - Informed Consent Verified: Yes (05/13/21 5898)  Nydia Consent - on file  Hepatitis Status- Susc/Imm 4/23/21  Machine #- Machine Number: [id number] (05/13/21 1345)  Telemetry status-bedside monitor  Pre-dialysis wt. -  n/a

## 2021-05-13 NOTE — PROGRESS NOTES
[facility] [address]  [address]  [phone]      Medical Progress Note      NAME: Cristina Tom   :  1950  MRM:  [id number]    Date/Time of service: 2021  9:38 AM       Subjective:     Chief Complaint:  Patient was personally seen and examined by me during this time period. Chart reviewed. Slightly hypertensive. Denies SOB, CP; all other systems reviewed are negative. Objective:       Vitals:       Last 24hrs VS reviewed since prior progress note.  Most recent are:    Visit Vitals  BP (!) 146/70   Pulse 70   Temp 98.4 °F (36.9 °C)   Resp 11   Ht 5' 10\" (1.778 m)   Wt 80 kg (176 lb 5.9 oz)   SpO2 94%   BMI 25.31 kg/m²     SpO2 Readings from Last 6 Encounters:   21 94%   20 94%   20 99%   18 99%   18 98%   18 98%    O2 Flow Rate (L/min): 2 l/min       Intake/Output Summary (Last 24 hours) at 2021 0936  Last data filed at 2021  Gross per 24 hour   Intake 240 ml   Output    Net 240 ml        Exam:     Physical Exam:    Gen: frail, in no acute distress  HEENT: PERRL, hearing intact to voice  Resp:  diminished b/l   Card:  RRR, No murmurs, normal S1, S2  Abd:  Soft, non-tender, non-distended, normoactive bowel sounds are present  Musc:  No cyanosis or clubbing  Skin:  No rashes or ulcers  Neuro:  Cranial nerves 3-12 are grossly intact, moves all extremities, follows commands appropriately  Psych:  Somnolent but arousable and Oriented to person, place, and time      Medications Reviewed: (see below)    Lab Data Reviewed: (see below)    ______________________________________________________________________    Medications:     Current Facility-Administered Medications   Medication Dose Route Frequency    midodrine (PROAMATINE) tablet 5 mg  5 mg Oral TID    lidocaine (XYLOCAINE) 10 mg/mL (1 %) injection 10-30 mL  10-30 mL SubCUTAneous Multiple    heparin (porcine) 1,000 unit/mL injection 2,500 Units  2,500 Units IntraVENous Multiple    epoetin medhat-epbx (RETACRIT) injection 10,000 Units  10,000 Units SubCUTAneous DIALYSIS MON, WED & FRI    sodium chloride (NS) flush 5-40 mL  5-40 mL IntraVENous Q8H    sodium chloride (NS) flush 5-40 mL  5-40 mL IntraVENous PRN    acetaminophen (TYLENOL) tablet 650 mg  650 mg Oral Q4H PRN    melatonin (rapid dissolve) tablet 5 mg  5 mg Oral QHS PRN    morphine injection 2 mg  2 mg IntraVENous Q4H PRN    ondansetron (ZOFRAN) injection 6 mg  6 mg IntraVENous Q6H PRN    heparin (porcine) injection 5,000 Units  5,000 Units SubCUTAneous Q8H    famotidine (PF) (PEPCID) 20 mg in 0.9% sodium chloride 10 mL injection  20 mg IntraVENous QPM    B complex-vitaminC-folic acid (NEPHROCAP) cap  1 Cap Oral DAILY    megestroL (MEGACE) tablet 40 mg  40 mg Oral BID    timolol (TIMOPTIC) 0.5 % ophthalmic solution 1 Drop  1 Drop Left Eye DAILY    predniSONE (DELTASONE) tablet 5 mg  5 mg Oral DAILY WITH BREAKFAST    sodium chloride (NS) flush 5-10 mL  5-10 mL IntraVENous PRN    piperacillin-tazobactam (ZOSYN) 3.375 g in 0.9% sodium chloride (MBP/ADV) 100 mL MBP  3.375 g IntraVENous Q12H          Lab Review:     Recent Labs     05/13/21  0353 05/12/21  0520 05/10/21  1654   WBC 12.7* 16.2* 9.6   HGB 9.7* 9.6* 13.3   HCT 31.5* 31.4* 42.4   * 98* 148*     Recent Labs     05/13/21  0353 05/12/21  0520 05/11/21  0315 05/10/21  2211 05/10/21  1654    137 139  --  138   K 4.2 4.5 3.5  --  3.2*    104 106  --  102   CO2 26 26 27  --  29    79 67  --  75   BUN 36* 25* 14  --  11   CREA 7.44* 6.09* 4.46*  --  3.82*   CA 7.8* 8.3* 7.9*  --  8.5   MG  --  2.2  --  1.4*  --    PHOS  --   --   --  1.6*  --    ALB  --   --  2.3*  --  3.1*   TBILI  --   --  0.4  --  0.4   ALT  --   --  9*  --  11*   INR  --   --  1.1  --   --      Lab Results   Component Value Date/Time    Glucose (POC) 134 (H) 03/15/2020 09:43 PM    Glucose (POC) 118 (H) 03/15/2020 04:18 PM    Glucose (POC) 170 (H) 03/15/2020 11:04 AM    Glucose (POC) 99 03/15/2020 07:34 AM    Glucose (POC) 92 03/14/2020 09:02 PM          Assessment / Plan:     Septic shock / Leukocytosis/ PNA :  improving, off of IV pressors. C/w midodrine but decrease dose. Follow cx. MRSA neg. C/w IV Zosyn. IV vancomycin stopped. Acute hypoxic respiratory failure/  Community acquired pneumonia of left lower lobe of lung: improving. due to PNA and sepsis as above. Plan as above. Maintain O2 sats at least 92-94% oxygen per protocol. Supportive care, incentive spirometry. Encephalopathy: resolved. Likely due to infection and hypoxia. Graft Malfunction: plan for vascular to de-clot today. Diarrhea : Covid neg.  Monitor      ESRD (end stage renal disease) on dialysis: Nephrology consult for ESRD on HD management.     History of cervical spinal surgery: PT/OT        Total time spent with patient: 29 Minutes **I personally saw and examined the patient during this time period**                 Care Plan discussed with: Patient and Nursing Staff    Discussed:  Care Plan    Prophylaxis:  Hep SQ    Disposition:   PT, OT, RN           ___________________________________________________    Attending Physician: Rangel Maldonado DO

## 2021-05-13 NOTE — PROGRESS NOTES
[address]  YOB: 1950          Assessment & Plan:   ESRD on HD MWF ([facility], [facility])  Hypotension, acute on chronic  Diarrhea  PNA   Recent cervical fusion at [facility] for cervical stenosis/myelopathy  History of failed renal transplant  Clotted AVF  Anemia    Rec:  Declined HD cath  For Declot today then HD. HD MWF regular schedule  EPO  Appreciate Dr. Sandi Borden assistance       Subjective:   CC: ESRD  HPI: Declined HD cath placement. For Declot today.  Off pressors  ROS: Denies sob/n/v  Current Facility-Administered Medications   Medication Dose Route Frequency    midodrine (PROAMATINE) tablet 5 mg  5 mg Oral TID    lidocaine (XYLOCAINE) 10 mg/mL (1 %) injection 10-30 mL  10-30 mL SubCUTAneous Multiple    heparin (porcine) 1,000 unit/mL injection 2,500 Units  2,500 Units IntraVENous Multiple    epoetin medhat-epbx (RETACRIT) injection 10,000 Units  10,000 Units SubCUTAneous DIALYSIS MON, WED & FRI    sodium chloride (NS) flush 5-40 mL  5-40 mL IntraVENous Q8H    sodium chloride (NS) flush 5-40 mL  5-40 mL IntraVENous PRN    acetaminophen (TYLENOL) tablet 650 mg  650 mg Oral Q4H PRN    melatonin (rapid dissolve) tablet 5 mg  5 mg Oral QHS PRN    morphine injection 2 mg  2 mg IntraVENous Q4H PRN    ondansetron (ZOFRAN) injection 6 mg  6 mg IntraVENous Q6H PRN    heparin (porcine) injection 5,000 Units  5,000 Units SubCUTAneous Q8H    famotidine (PF) (PEPCID) 20 mg in 0.9% sodium chloride 10 mL injection  20 mg IntraVENous QPM    B complex-vitaminC-folic acid (NEPHROCAP) cap  1 Cap Oral DAILY    megestroL (MEGACE) tablet 40 mg  40 mg Oral BID    timolol (TIMOPTIC) 0.5 % ophthalmic solution 1 Drop  1 Drop Left Eye DAILY    predniSONE (DELTASONE) tablet 5 mg  5 mg Oral DAILY WITH BREAKFAST    sodium chloride (NS) flush 5-10 mL  5-10 mL IntraVENous PRN    piperacillin-tazobactam (ZOSYN) 3.375 g in 0.9% sodium chloride (MBP/ADV) 100 mL MBP  3.375 g IntraVENous Q12H          Objective:     Vitals:  Blood pressure (!) 151/82, pulse 66, temperature 98.4 °F (36.9 °C), resp. rate 16, height 5' 10\" (1.778 m), weight 80 kg (176 lb 5.9 oz), SpO2 92 %. Temp (24hrs), Av.4 °F (36.9 °C), Min:98.1 °F (36.7 °C), Max:98.9 °F (37.2 °C)      Intake and Output:  No intake/output data recorded.  190 -  0700  In: 406.7 [P.O.:240; I.V.:166.7]  Out: -     Physical Exam:               NAD  CV s1s2  Lungs clear, on oxygen  Ext min edema  AVG LUE not patent          ECG/rhythm:    Data Review      No results for input(s): TNIPOC in the last 72 hours. No lab exists for component: ITNL   Recent Labs     05/10/21  2211   TROIQ 0.07*     Recent Labs     21  0353 21  0520 21  0315 05/10/21  2211 05/10/21  1654    137 139  --  138   K 4.2 4.5 3.5  --  3.2*    104 106  --  102   CO2 26 26 27  --  29   BUN 36* 25* 14  --  11   CREA 7.44* 6.09* 4.46*  --  3.82*    79 67  --  75   PHOS  --   --   --  1.6*  --    MG  --  2.2  --  1.4*  --    CA 7.8* 8.3* 7.9*  --  8.5   ALB  --   --  2.3*  --  3.1*   WBC 12.7* 16.2*  --   --  9.6   HGB 9.7* 9.6*  --   --  13.3   HCT 31.5* 31.4*  --   --  42.4   * 98*  --   --  148*      Recent Labs     21   INR 1.1   PTP 11.1   APTT 27.6     Needs: urine analysis, urine sodium, protein and creatinine  No results found for: RENUKA, CREAU        : Adriana Coleman MD  2021        [facility]:  [email or web address]  Yara Estimable office:  [address] [place] [address],[address]  [place], [address] [place] [address]  Phone: [phone]  Fax :     [phone]    [place] office:  [address] [facility], [facility] [address]  Phone - [phone]  Fax - [phone]

## 2021-05-13 NOTE — PROGRESS NOTES
1900 Report received from Cielo Baptiste. Pt resting in bed finishing dinner. Wife at bedside. See flowsheet and MAR for assessments and medications. 0400 Pt calling out. Pt states his neck hurts and he is confused doesn't know where he is or what is going on. Pt oriented to self only. Pt reoriented pt verbalized understanding on using call bell. Bed alarm on at this time. Will continue to monitor. Labs drawn via peripheral stick in right wrist. Call bell in reach  0540 Pt asleep on bed. Call bell in reach. Arouses to verbal stimuli.  NAD noted Will continue to monitor  0700 Report given to next shift

## 2021-05-13 NOTE — PROGRESS NOTES
Case Management follow-up note:    RUR 23%(Yellow) moderate risk for a future readmission     Pt is on a Monday/Wednesday/Friday @ [address] [place] [address] Dr BHATIA:rolling walker,tri-cane  Pt lives with his wife in a two story home with 7 entry steps and 13 steps to navigate from the first to second floors. Bedroom and bath are located on the second floor    HD is currently in session. Pt has orders to transfer to step-down. I am following pt for any additional discharge needs.     Aby Yepez

## 2021-05-14 VITALS
BODY MASS INDEX: 25.25 KG/M2 | DIASTOLIC BLOOD PRESSURE: 79 MMHG | WEIGHT: 176.37 LBS | RESPIRATION RATE: 19 BRPM | HEART RATE: 73 BPM | HEIGHT: 70 IN | SYSTOLIC BLOOD PRESSURE: 152 MMHG | TEMPERATURE: 98.5 F | OXYGEN SATURATION: 92 %

## 2021-05-14 LAB
ANION GAP SERPL CALC-SCNC: 4 MMOL/L (ref 5–15)
BASOPHILS # BLD: 0.1 K/UL (ref 0–0.1)
BASOPHILS NFR BLD: 1 % (ref 0–1)
BUN SERPL-MCNC: 21 MG/DL (ref 6–20)
BUN/CREAT SERPL: 4 (ref 12–20)
CALCIUM SERPL-MCNC: 8.1 MG/DL (ref 8.5–10.1)
CHLORIDE SERPL-SCNC: 107 MMOL/L (ref 97–108)
CO2 SERPL-SCNC: 27 MMOL/L (ref 21–32)
CREAT SERPL-MCNC: 5.12 MG/DL (ref 0.7–1.3)
DIFFERENTIAL METHOD BLD: ABNORMAL
EOSINOPHIL # BLD: 0.4 K/UL (ref 0–0.4)
EOSINOPHIL NFR BLD: 6 % (ref 0–7)
ERYTHROCYTE [DISTWIDTH] IN BLOOD BY AUTOMATED COUNT: 15.5 % (ref 11.5–14.5)
GLUCOSE SERPL-MCNC: 81 MG/DL (ref 65–100)
HCT VFR BLD AUTO: 32.7 % (ref 36.6–50.3)
HGB BLD-MCNC: 9.8 G/DL (ref 12.1–17)
IMM GRANULOCYTES # BLD AUTO: 0 K/UL (ref 0–0.04)
IMM GRANULOCYTES NFR BLD AUTO: 1 % (ref 0–0.5)
LYMPHOCYTES # BLD: 1.6 K/UL (ref 0.8–3.5)
LYMPHOCYTES NFR BLD: 25 % (ref 12–49)
M PNEUMO IGG SER IA-ACNC: 180 U/ML (ref 0–99)
M PNEUMO IGM SER IA-ACNC: <770 U/ML (ref 0–769)
MCH RBC QN AUTO: 30.2 PG (ref 26–34)
MCHC RBC AUTO-ENTMCNC: 30 G/DL (ref 30–36.5)
MCV RBC AUTO: 100.9 FL (ref 80–99)
MONOCYTES # BLD: 0.5 K/UL (ref 0–1)
MONOCYTES NFR BLD: 8 % (ref 5–13)
NEUTS SEG # BLD: 3.8 K/UL (ref 1.8–8)
NEUTS SEG NFR BLD: 60 % (ref 32–75)
NRBC # BLD: 0 K/UL (ref 0–0.01)
NRBC BLD-RTO: 0 PER 100 WBC
PLATELET # BLD AUTO: 101 K/UL (ref 150–400)
PMV BLD AUTO: 11.5 FL (ref 8.9–12.9)
POTASSIUM SERPL-SCNC: 3.8 MMOL/L (ref 3.5–5.1)
RBC # BLD AUTO: 3.24 M/UL (ref 4.1–5.7)
SODIUM SERPL-SCNC: 138 MMOL/L (ref 136–145)
WBC # BLD AUTO: 6.4 K/UL (ref 4.1–11.1)

## 2021-05-14 PROCEDURE — 74011000250 HC RX REV CODE- 250: Performed by: INTERNAL MEDICINE

## 2021-05-14 PROCEDURE — 80048 BASIC METABOLIC PNL TOTAL CA: CPT

## 2021-05-14 PROCEDURE — 05HN33Z INSERTION OF INFUSION DEVICE INTO LEFT INTERNAL JUGULAR VEIN, PERCUTANEOUS APPROACH: ICD-10-PCS

## 2021-05-14 PROCEDURE — 74011250637 HC RX REV CODE- 250/637: Performed by: HOSPITALIST

## 2021-05-14 PROCEDURE — 74011636637 HC RX REV CODE- 636/637: Performed by: INTERNAL MEDICINE

## 2021-05-14 PROCEDURE — 85025 COMPLETE CBC W/AUTO DIFF WBC: CPT

## 2021-05-14 PROCEDURE — 74011250636 HC RX REV CODE- 250/636: Performed by: INTERNAL MEDICINE

## 2021-05-14 PROCEDURE — 74011000258 HC RX REV CODE- 258: Performed by: INTERNAL MEDICINE

## 2021-05-14 PROCEDURE — 36415 COLL VENOUS BLD VENIPUNCTURE: CPT

## 2021-05-14 PROCEDURE — 057Y3ZZ DILATION OF UPPER VEIN, PERCUTANEOUS APPROACH: ICD-10-PCS

## 2021-05-14 PROCEDURE — 90935 HEMODIALYSIS ONE EVALUATION: CPT

## 2021-05-14 PROCEDURE — 74011250637 HC RX REV CODE- 250/637: Performed by: INTERNAL MEDICINE

## 2021-05-14 RX ORDER — CEFDINIR 300 MG/1
300 CAPSULE ORAL EVERY OTHER DAY
Qty: 3 CAP | Refills: 0 | OUTPATIENT
Start: 2021-05-14 | End: 2022-03-24

## 2021-05-14 RX ORDER — MIDODRINE HYDROCHLORIDE 5 MG/1
2.5 TABLET ORAL 2 TIMES DAILY WITH MEALS
Status: DISCONTINUED | OUTPATIENT
Start: 2021-05-14 | End: 2021-05-14

## 2021-05-14 RX ORDER — FAMOTIDINE 20 MG/1
20 TABLET, FILM COATED ORAL
Status: DISCONTINUED | OUTPATIENT
Start: 2021-05-14 | End: 2021-05-15 | Stop reason: HOSPADM

## 2021-05-14 RX ORDER — MIDODRINE HYDROCHLORIDE 5 MG/1
2.5 TABLET ORAL
Status: DISCONTINUED | OUTPATIENT
Start: 2021-05-14 | End: 2021-05-15 | Stop reason: HOSPADM

## 2021-05-14 RX ORDER — MIDODRINE HYDROCHLORIDE 5 MG/1
10 TABLET ORAL
Qty: 30 TAB | Refills: 0 | Status: SHIPPED
Start: 2021-05-14

## 2021-05-14 RX ORDER — DOXYCYCLINE 100 MG/1
100 TABLET ORAL 2 TIMES DAILY
Qty: 14 TAB | Refills: 0 | Status: SHIPPED | OUTPATIENT
Start: 2021-05-14 | End: 2021-05-21

## 2021-05-14 RX ORDER — CEFDINIR 300 MG/1
300 CAPSULE ORAL 2 TIMES DAILY
Qty: 10 CAP | Refills: 0 | Status: SHIPPED | OUTPATIENT
Start: 2021-05-14 | End: 2021-05-14

## 2021-05-14 RX ADMIN — EPOETIN ALFA-EPBX 10000 UNITS: 10000 INJECTION, SOLUTION INTRAVENOUS; SUBCUTANEOUS at 20:29

## 2021-05-14 RX ADMIN — ALTEPLASE 4 MG: 2.2 INJECTION, POWDER, LYOPHILIZED, FOR SOLUTION INTRAVENOUS at 12:50

## 2021-05-14 RX ADMIN — HEPARIN SODIUM 5000 UNITS: 5000 INJECTION INTRAVENOUS; SUBCUTANEOUS at 06:18

## 2021-05-14 RX ADMIN — HEPARIN SODIUM 3800 UNITS: 1000 INJECTION INTRAVENOUS; SUBCUTANEOUS at 16:34

## 2021-05-14 RX ADMIN — MORPHINE SULFATE 2 MG: 2 INJECTION, SOLUTION INTRAMUSCULAR; INTRAVENOUS at 14:22

## 2021-05-14 RX ADMIN — MIDODRINE HYDROCHLORIDE 2.5 MG: 5 TABLET ORAL at 20:29

## 2021-05-14 RX ADMIN — MEGESTROL ACETATE 40 MG: 40 TABLET ORAL at 08:28

## 2021-05-14 RX ADMIN — PREDNISONE 5 MG: 5 TABLET ORAL at 08:28

## 2021-05-14 RX ADMIN — NEPHROCAP 1 CAPSULE: 1 CAP ORAL at 08:28

## 2021-05-14 RX ADMIN — MORPHINE SULFATE 2 MG: 2 INJECTION, SOLUTION INTRAMUSCULAR; INTRAVENOUS at 18:06

## 2021-05-14 RX ADMIN — Medication 10 ML: at 06:18

## 2021-05-14 RX ADMIN — FAMOTIDINE 20 MG: 20 TABLET, FILM COATED ORAL at 20:29

## 2021-05-14 RX ADMIN — ACETAMINOPHEN 650 MG: 325 TABLET ORAL at 19:35

## 2021-05-14 RX ADMIN — MEGESTROL ACETATE 40 MG: 40 TABLET ORAL at 18:06

## 2021-05-14 RX ADMIN — HEPARIN SODIUM 5000 UNITS: 5000 INJECTION INTRAVENOUS; SUBCUTANEOUS at 15:42

## 2021-05-14 RX ADMIN — Medication 10 ML: at 15:43

## 2021-05-14 RX ADMIN — MIDODRINE HYDROCHLORIDE 2.5 MG: 5 TABLET ORAL at 08:28

## 2021-05-14 RX ADMIN — MORPHINE SULFATE 2 MG: 2 INJECTION, SOLUTION INTRAMUSCULAR; INTRAVENOUS at 03:12

## 2021-05-14 RX ADMIN — DOXYCYCLINE 100 MG: 100 INJECTION, POWDER, LYOPHILIZED, FOR SOLUTION INTRAVENOUS at 09:30

## 2021-05-14 RX ADMIN — CEFTRIAXONE 1 G: 1 INJECTION, POWDER, FOR SOLUTION INTRAMUSCULAR; INTRAVENOUS at 09:30

## 2021-05-14 RX ADMIN — TIMOLOL MALEATE 1 DROP: 5 SOLUTION OPHTHALMIC at 09:30

## 2021-05-14 NOTE — PROGRESS NOTES
Case Management     RUR 20 %    Plan:  PT evaluation. Per physician's request,I contacted PT requesting they please evaluate pt today as pt may be discharging home. HD today before pt is discharged either later today or tomorrow if pt tolerates post HD. Liana Buenrostroantonio Pt goes to [facility] on [address] and Fridays. [facility] likely [facility] home today or tomorrow. Tests results from rapid and PCR tests for Covid 19 included in the faxes to [address]. Dialysis nurse is working out the kinks with HD right now. Pt is being dialyzed. I met with pt. Per pt,he is essentially wheelchair bound @ home. He told me he tries to get up every two hours to stretch his legs and can take a few steps but that is as far as he can go. He has to do this with assistance. Pt states he has a ramp @ home and has a chair lift that he uses to get from floor to floor. I talked with pt.'s wife as pt had informed me pt was about to start home health services with [facility]. Home health orders faxed to [facility] and [facility] Information on [facility] placed on AVS.    PCS prepared for transport home . This includes pt.'s transport form,H&P,Covid results prepared for transport. To set up  Transport,please call [facility] @ [phone] to arrange transport if I have left before the time pt is discharged.     Denita Blunt CM

## 2021-05-14 NOTE — PROGRESS NOTES
Physical Therapy orders acknowledged, chart reviewed and discussed with nurse patient patient just started dialysis. We will continue to follow up with the patient for therapy thank you.

## 2021-05-14 NOTE — PROGRESS NOTES
Lambert Delgadillo [facility] [address]  [address], [place], [address] [facility]  [phone]      Medical Progress Note      NAME: Deandra Fam   :  1950  MRM:  [id number]    Date/Time of service: 2021  1:41 PM       Subjective:     Chief Complaint:  Patient was personally seen and examined by me during this time period. Chart reviewed. Remains hypertensive. Denies SOB, CP; all other systems reviewed are negative. Objective:       Vitals:       Last 24hrs VS reviewed since prior progress note.  Most recent are:    Visit Vitals  BP (!) 167/85   Pulse 68   Temp 98.1 °F (36.7 °C)   Resp 15   Ht 5' 10\" (1.778 m)   Wt 80 kg (176 lb 5.9 oz)   SpO2 98%   BMI 25.31 kg/m²     SpO2 Readings from Last 6 Encounters:   21 98%   20 94%   20 99%   18 99%   18 98%   18 98%    O2 Flow Rate (L/min): 3 l/min       Intake/Output Summary (Last 24 hours) at 2021 1341  Last data filed at 2021 0930  Gross per 24 hour   Intake 770 ml   Output 1700 ml   Net -930 ml        Exam:     Physical Exam:    Gen: frail, in no acute distress  HEENT: PERRL, hearing intact to voice  Resp:  diminished b/l   Card:  RRR, No murmurs, normal S1, S2  Abd:  Soft, non-tender, non-distended, normoactive bowel sounds are present  Musc:  No cyanosis or clubbing  Skin:  No rashes or ulcers  Neuro:  Cranial nerves 3-12 are grossly intact, moves all extremities, follows commands appropriately  Psych:  Somnolent but arousable and Oriented to person, place, and time      Medications Reviewed: (see below)    Lab Data Reviewed: (see below)    ______________________________________________________________________    Medications:     Current Facility-Administered Medications   Medication Dose Route Frequency    cefTRIAXone (ROCEPHIN) 1 g in sterile water (preservative free) 10 mL IV syringe  1 g IntraVENous Q24H    doxycycline (VIBRAMYCIN) 100 mg in 0.9% sodium chloride (MBP/ADV) 100 mL MBP 100 mg IntraVENous Q12H    midodrine (PROAMATINE) tablet 2.5 mg  2.5 mg Oral Q MON, WED & FRI    heparin (porcine) 1,000 unit/mL injection 3,800 Units  3,800 Units Hemodialysis DIALYSIS PRN    epoetin medhat-epbx (RETACRIT) injection 10,000 Units  10,000 Units SubCUTAneous DIALYSIS MON, WED & FRI    sodium chloride (NS) flush 5-40 mL  5-40 mL IntraVENous Q8H    sodium chloride (NS) flush 5-40 mL  5-40 mL IntraVENous PRN    acetaminophen (TYLENOL) tablet 650 mg  650 mg Oral Q4H PRN    melatonin (rapid dissolve) tablet 5 mg  5 mg Oral QHS PRN    morphine injection 2 mg  2 mg IntraVENous Q4H PRN    ondansetron (ZOFRAN) injection 6 mg  6 mg IntraVENous Q6H PRN    heparin (porcine) injection 5,000 Units  5,000 Units SubCUTAneous Q8H    famotidine (PF) (PEPCID) 20 mg in 0.9% sodium chloride 10 mL injection  20 mg IntraVENous QPM    B complex-vitaminC-folic acid (NEPHROCAP) cap  1 Cap Oral DAILY    megestroL (MEGACE) tablet 40 mg  40 mg Oral BID    timolol (TIMOPTIC) 0.5 % ophthalmic solution 1 Drop  1 Drop Left Eye DAILY    predniSONE (DELTASONE) tablet 5 mg  5 mg Oral DAILY WITH BREAKFAST    sodium chloride (NS) flush 5-10 mL  5-10 mL IntraVENous PRN          Lab Review:     Recent Labs     05/14/21  0435 05/13/21  0353 05/12/21  0520   WBC 6.4 12.7* 16.2*   HGB 9.8* 9.7* 9.6*   HCT 32.7* 31.5* 31.4*   * 112* 98*     Recent Labs     05/14/21  0435 05/13/21  0353 05/12/21  0520    137 137   K 3.8 4.2 4.5    104 104   CO2 27 26 26   GLU 81 100 79   BUN 21* 36* 25*   CREA 5.12* 7.44* 6.09*   CA 8.1* 7.8* 8.3*   MG  --   --  2.2     Lab Results   Component Value Date/Time    Glucose (POC) 134 (H) 03/15/2020 09:43 PM    Glucose (POC) 118 (H) 03/15/2020 04:18 PM    Glucose (POC) 170 (H) 03/15/2020 11:04 AM    Glucose (POC) 99 03/15/2020 07:34 AM    Glucose (POC) 92 03/14/2020 09:02 PM          Assessment / Plan:     Septic shock / Leukocytosis/ PNA :  improving, off of IV pressors.  Change midodrine to just HD days. Follow cx. MRSA neg. Mycoplasma positive; add doxycyline. Change IV Zosyn to ceftriaxone. IV vancomycin stopped. Acute hypoxic respiratory failure/  Community acquired pneumonia of left lower lobe of lung: improving. due to PNA and sepsis as above. Plan as above. Maintain O2 sats at least 92-94% oxygen per protocol. Supportive care, incentive spirometry. Encephalopathy: resolved. Likely due to infection and hypoxia. Graft Malfunction: vascular unable to de-clot; permcath placed 5/13. Diarrhea : Covid neg.  Monitor      ESRD (end stage renal disease) on dialysis: Nephrology consult for ESRD on HD management.     History of cervical spinal surgery: PT consult.        Total time spent with patient: 35 Minutes **I personally saw and examined the patient during this time period**                 Care Plan discussed with: Patient and Nursing Staff    Discussed:  Care Plan    Prophylaxis:  Hep SQ    Disposition:   PT, OT, RN           ___________________________________________________    Attending Physician: Constanza Laureano DO

## 2021-05-14 NOTE — PROGRESS NOTES
[address]  YOB: 1950          Assessment & Plan:   ESRD on HD MWF ([facility], [facility])  Hypotension, acute on chronic  Diarrhea  PNA   Recent cervical fusion at [facility] for cervical stenosis/myelopathy  History of failed renal transplant  Clotted AVF  Anemia    Rec:  Unable to declot old graft.  Got PC and will f/u outpt for new access  EPO  HD today and MWF  Appreciate Dr. Bárbara Terrazas assistance       Subjective:   CC: ESRD  HPI: HD yesterday, for HD today to get on schedule  ROS: Denies sob/n/v  Current Facility-Administered Medications   Medication Dose Route Frequency    cefTRIAXone (ROCEPHIN) 1 g in sterile water (preservative free) 10 mL IV syringe  1 g IntraVENous Q24H    doxycycline (VIBRAMYCIN) 100 mg in 0.9% sodium chloride (MBP/ADV) 100 mL MBP  100 mg IntraVENous Q12H    midodrine (PROAMATINE) tablet 2.5 mg  2.5 mg Oral BID WITH MEALS    heparin (porcine) 1,000 unit/mL injection 3,800 Units  3,800 Units Hemodialysis DIALYSIS PRN    epoetin medhat-epbx (RETACRIT) injection 10,000 Units  10,000 Units SubCUTAneous DIALYSIS MON, WED & FRI    sodium chloride (NS) flush 5-40 mL  5-40 mL IntraVENous Q8H    sodium chloride (NS) flush 5-40 mL  5-40 mL IntraVENous PRN    acetaminophen (TYLENOL) tablet 650 mg  650 mg Oral Q4H PRN    melatonin (rapid dissolve) tablet 5 mg  5 mg Oral QHS PRN    morphine injection 2 mg  2 mg IntraVENous Q4H PRN    ondansetron (ZOFRAN) injection 6 mg  6 mg IntraVENous Q6H PRN    heparin (porcine) injection 5,000 Units  5,000 Units SubCUTAneous Q8H    famotidine (PF) (PEPCID) 20 mg in 0.9% sodium chloride 10 mL injection  20 mg IntraVENous QPM    B complex-vitaminC-folic acid (NEPHROCAP) cap  1 Cap Oral DAILY    megestroL (MEGACE) tablet 40 mg  40 mg Oral BID    timolol (TIMOPTIC) 0.5 % ophthalmic solution 1 Drop  1 Drop Left Eye DAILY    predniSONE (DELTASONE) tablet 5 mg  5 mg Oral DAILY WITH BREAKFAST    sodium chloride (NS) flush 5-10 mL  5-10 mL IntraVENous PRN          Objective:     Vitals:  Blood pressure (!) 153/86, pulse 71, temperature 98.8 °F (37.1 °C), resp. rate 14, height 5' 10\" (1.778 m), weight 80 kg (176 lb 5.9 oz), SpO2 91 %. Temp (24hrs), Av.4 °F (36.9 °C), Min:97.8 °F (36.6 °C), Max:98.8 °F (37.1 °C)      Intake and Output:  No intake/output data recorded.  1901 -  0700  In: 800 [P.O.:700; I.V.:100]  Out: 1700     Physical Exam:               NAD  NECK LIJ PC  CV s1s2  Lungs clear, on RA  Ext min edema  AVG LUE not patent          ECG/rhythm:    Data Review      No results for input(s): TNIPOC in the last 72 hours. No lab exists for component: ITNL   No results for input(s): CPK, CKMB, TROIQ in the last 72 hours. Recent Labs     21  0435 21  0353 21  0520    137 137   K 3.8 4.2 4.5    104 104   CO2 27 26 26   BUN 21* 36* 25*   CREA 5.12* 7.44* 6.09*   GLU 81 100 79   MG  --   --  2.2   CA 8.1* 7.8* 8.3*   WBC 6.4 12.7* 16.2*   HGB 9.8* 9.7* 9.6*   HCT 32.7* 31.5* 31.4*   * 112* 98*      No results for input(s): INR, PTP, APTT, INREXT, INREXT in the last 72 hours. Needs: urine analysis, urine sodium, protein and creatinine  No results found for: ALCIDES GRAYSON        : Esme Parra MD  2021        [facility]:  [email or web address]. [email or web address]  Malena Cortes office:  [address] [place] [address], [address] [place] [address] [address] [place] [address]  Phone: [phone]  Fax :     [phone]    Prakash office:  [address] [facility]  [place], [address] [place] [address]  Phone - [phone]  Fax - [phone]

## 2021-05-14 NOTE — OP NOTES
Lambert Delgadillo [facility] [address]  OPERATIVE REPORT    Name:  Falguni Maki  MR#:  [id number]  :  1950  ACCOUNT #:  [de-identified]  DATE OF SERVICE:  2021    PREOPERATIVE DIAGNOSIS:  End-stage renal disease. POSTOPERATIVE DIAGNOSIS:  End-stage renal disease. PROCEDURE PERFORMED:  1. Ultrasound-guided cannulation of left internal jugular vein. 2.  Placement of left internal jugular tunneled dialysis catheter with fluoroscopy. SURGEON:  Corrine Calderón MD    ASSISTANT:  None. ANESTHESIA:  IV sedation with local anesthesia. COMPLICATIONS:  None. SPECIMENS REMOVED:  None. IMPLANTS:  Catheter. ESTIMATED BLOOD LOSS:  Minimal.    INDICATIONS FOR PROCEDURE:  The patient is a 82-year-old male with an AV graft that is nonfunctioning following attempted thrombectomy. A decision was made to place a dialysis catheter for dialysis. PROCEDURE:  The patient was taken to the cath lab, laid in recumbent position. The left internal jugular vein was examined under ultrasound and confirmed to be patient and accessed under direct ultrasound guidance. Wire was passed. The tract was serially dilated and a tunneling sheath placed. Tunneled dialysis catheter tip was then positioned at the atriocaval junction and brought up through a separate stab incision on the left chest wall. Catheter flushed easily with no evidence of kink or malposition. He tolerated the procedure well. There were no obvious complications. He was transferred to the recovery area in good condition.       Rafa Dorado MD      [id number]/  D:  2021 7:08  T:  2021 11:38  JOB #:  [id number]

## 2021-05-14 NOTE — PROGRESS NOTES
0710-Bedside report received from Eloise Mathis RN. SBAR, Kardex, Procedure Summary, Intake/Output, MAR, Recent Results and Cardiac Rhythm SR BBB were discussed. Pt observed resting in bed, without distress. Will assess. Peri Meléndez RN  1912-Bedside and Verbal shift change report given to Eloise Mathis RN (oncoming nurse) by ANTHOYN Floyd RN (offgoing nurse). Report included the following information SBAR, Kardex, Procedure Summary, Intake/Output, MAR, Recent Results and Cardiac Rhythm SR with PVC's.  Amol ALSTON

## 2021-05-14 NOTE — PROCEDURES
Nydia Dialysis Team [facility]  [phone]    Vitals   Pre   Post   Assessment   Pre   Post     Temp  Temp: 98.1 °F (36.7 °C) (05/14/21 1207)  98.8 LOC  A&OX4 A&OX4   HR   Pulse (Heart Rate): 68 (05/14/21 1331) 70 Lungs   CTA  CTA   B/P   BP: (!) 167/85 (05/14/21 1331) 160/92 Cardiac   Paced with PVCs  paced with paired pvcs   Resp   Resp Rate: 15 (05/14/21 1331) 15 Skin   W/D  W/D   Pain level  Pain Intensity 1: 0 (05/14/21 0800) 0 Edema    None noted   None noted   Orders:    Duration:   Start:    13:31 End:     Total:      Dialyzer:   Dialyzer/Set Up Inspection: Revaclear (05/14/21 1331)   K Bath:   Dialysate K (mEq/L): 3 (05/14/21 1331)   Ca Bath:   Dialysate CA (mEq/L): 2.5 (05/14/21 1331)   Na/Bicarb:   Dialysate NA (mEq/L): 138 (05/14/21 1331)   Target Fluid Removal:   Goal/Amount of Fluid to Remove (mL): 2000 mL (05/14/21 1331)   Access     Type & Location:   Left chest tunneled cath   Labs     Obtained/Reviewed   Critical Results Called   Date when labs were drawn-  Hgb-    HGB   Date Value Ref Range Status   05/14/2021 9.8 (L) 12.1 - 17.0 g/dL Final     K-    Potassium   Date Value Ref Range Status   05/14/2021 3.8 3.5 - 5.1 mmol/L Final     Ca-   Calcium   Date Value Ref Range Status   05/14/2021 8.1 (L) 8.5 - 10.1 MG/DL Final     Bun-   BUN   Date Value Ref Range Status   05/14/2021 21 (H) 6 - 20 MG/DL Final     Creat-   Creatinine   Date Value Ref Range Status   05/14/2021 5.12 (H) 0.70 - 1.30 MG/DL Final     Comment:     INVESTIGATED PER DELTA CHECK PROTOCOL        Medications/ Blood Products Given     Name   Dose   Route and Time     heparin 1:1000  1.9 mls in venous 1.9 mls in arterial             Blood Volume Processed (BVP):    65.2 Net Fluid   Removed:  2600   Comments   Time Out Done: 13:30  Primary Nurse Rpt Pre: Autumn Everett  Primary Nurse Rpt Post: Autumn Everett  Pt Education: access care, procedural, troubleshooting  Care Plan: possible discharge after HD  Tx Summary: Report received from primary RN. Time out and assessment performed and documented. CVC was placed yesterday - today it will not aspirate blood from either port. Dr. Audrey Damon notified and Cath flow was ordered. Cath flow was instilled and dwelt 45 minutes - then both ports were able to be aspirated with blood. Each catheter limb disinfected per p&p, caps removed, hubs disinfected per p&p. Treatment started at 13:31. Cath is positional and only able to maintain a BFR of 350. Treatment ended at 17:02   All possible blood returned to patient. Each dialysis catheter limb disinfected per p&p, blood returned per p&p, each dialysis hub disinfected per p&p, post dialysis catheter dwell instilled per order, and caps applied. Admiting Diagnosis: Syncope with HD  Pt's previous clinic- [facility]  Consent signed - Informed Consent Verified: Yes (05/14/21 1331)  Nydia Consent - yes  Hepatitis Status- negative/immune 4/23/21  Machine #- Machine Number: [id number] (05/14/21 1331)  Telemetry status-bedside paced pvcs  Pre-dialysis wt. -

## 2021-05-14 NOTE — PROGRESS NOTES
[facility] transport by bLS confirmed for 8:30 pm discharge. Nurse notified about transport time.     Melisa Ladd

## 2021-05-14 NOTE — DISCHARGE INSTRUCTIONS
HOSPITALIST DISCHARGE INSTRUCTIONS  NAME: Pérez Velasco   :  1950   MRN:  [id number]     Date/Time:  2021 4:45 PM    ADMIT DATE: 5/10/2021     DISCHARGE DATE: 2021     ADMITTING DIAGNOSIS:  Sepsis   Pneumonia     DISCHARGE DIAGNOSIS:  Sepsis   Pneumonia     Patient Education        Sepsis: Care Instructions  Overview     Sepsis is an intense reaction to an infection. It can cause damage to the body and lead to dangerously low blood pressure. You may have inflammation across large areas of your body. It can damage tissue and even go deep into your organs. Infections that can lead to sepsis include:  · A skin infection such as from a cut. · A lung infection like pneumonia. · A kidney infection. · A gut infection such as E. coli. Sepsis is treated with antibiotics. Your doctor will try to find the infection that led to sepsis. Shauna Paige also get fluids through a vein (IV). Machines will track your vital signs, including temperature, blood pressure, breathing rate, and pulse rate. The physical and mental effects of sepsis may not be seen for several weeks after treatment. And they may last long after the infection is gone. Physical problems may include:  · Feeling weak and tired. · Feeling out of breath. · Aches and pains. · Problems with getting around. · Trouble falling asleep or staying asleep. · Dry and itchy skin, brittle nails, and hair loss. Some of these effects can lead to problems with your organs or your feet, legs, hands, or arms. Sepsis can also affect your mind and emotions. Problems may include:  · Self-doubt. · Anxiety. · Nightmares. · Depression and mood problems. · Wanting to avoid other people. · Confusion. · Flashbacks and bad memories of your illness. It's important to care for yourself and try to avoid infections. This may lower your risk of getting sepsis again. Follow-up care is a key part of your treatment and safety.  Be sure to make and go to all appointments, and call your doctor if you are having problems. It's also a good idea to know your test results and keep a list of the medicines you take. How can you care for yourself at home? · Be safe with medicines. Take your medicines exactly as prescribed. Call your doctor if you think you are having a problem with your medicine. · If your doctor prescribed antibiotics, take them as directed. Do not stop taking them just because you feel better. You need to take the full course of antibiotics. · Help prevent infections that could again lead to sepsis. ? Try to avoid colds and flu. If you must be around people who have a cold or the flu, wash your hands often. And get a flu vaccine every year. ? Ask your doctor if you need a pneumococcal vaccine (to prevent pneumonia, meningitis, and other infections). If you have had one before, ask your doctor if you need another dose. ? Clean any wounds or scrapes. · Do not smoke or use other tobacco products. When you quit smoking, you are less likely to get a cold, the flu, bronchitis, and pneumonia. If you need help quitting, talk to your doctor about stop-smoking programs and medicines. These can increase your chances of quitting for good. · Drink plenty of fluids to prevent dehydration. Choose water and other caffeine-free clear liquids until you feel better. If you have kidney, heart, or liver disease and have to limit fluids, talk with your doctor before you increase the amount of fluids you drink. · Eat a healthy diet. Include fruits, vegetables, and whole grains in your diet every day. · If your doctor recommends it, try doing some physical activity. Walking is a good choice. Bit by bit, increase the amount you walk every day. · Talk with your family and friends about your challenges. Ask for help if you need it. · Keep a journal. Writing down your thoughts and feelings can help reduce your stress.   · Ask family members to fill in gaps in your memory. · Set small goals for yourself that you can reach. Reward yourself for success. When should you call for help? Call  911 anytime you think you may need emergency care. For example, call if:    · You passed out (lost consciousness). Call your doctor now or seek immediate medical care if:    · You have symptoms such as:  ? Shortness of breath. ? Feeling very sick. ? Severe pain. ? A fast heart rate. ? Cool, pale, or clammy skin. ? Feeling confused. ? Feeling very sleepy, or you are hard to wake up.     · You are dizzy or lightheaded, or you feel like you may faint.     · You have a fever or chills. Watch closely for changes in your health, and be sure to contact your doctor if:    · You do not get better as expected. Where can you learn more? Go to http://www.gray.com/  Enter T383 in the search box to learn more about \"Sepsis: Care Instructions. \"  Current as of: September 23, 2020               Content Version: 12.8  © 3941-7071 [employer]. Care instructions adapted under license by [employer] (which disclaims liability or warranty for this information). If you have questions about a medical condition or this instruction, always ask your healthcare professional. [address] any warranty or liability for your use of this information. MEDICATIONS:     · It is important that you take the medication exactly as they are prescribed. · Keep your medication in the bottles provided by the pharmacist and keep a list of the medication names, dosages, and times to be taken in your wallet.    · Do not take other medications without consulting your doctor     Pain Management: per above medications    What to do at Home    Recommended diet:  Renal Diet    Recommended activity: Activity as tolerated    1) Return to the hospital if you feel worse    2) If you experience any of the following symptoms then please call your primary care physician or return to the emergency room if you cannot get hold of your doctor:  Fever, chills, nausea, vomiting, diarrhea, change in mentation, falling, bleeding, shortness of breath, chest pain, severe headache, severe abdominal pain. Follow Up: Follow-up Information     Follow up With Specialties Details Why Contact Info    Fabienne Sullivan NP Nurse Practitioner Schedule an appointment as soon as possible for a visit on 5/17/2021 10:00am [address] [place] [address]  [phone]      Your dialysis and nephrologist  Go to As scheduled     [address] [place] [address]  [phone]            Information obtained by :  I understand that if any problems occur once I am at home I am to contact my physician. I understand and acknowledge receipt of the instructions indicated above.                                                                                                                                            Physician's or R.N.'s Signature                                                                  Date/Time                                                                                                                                              Patient or Representative Signature                                                          Date/Time

## 2021-05-14 NOTE — DISCHARGE SUMMARY
[facility]  [address] [facility], [address] [place] [address]  [phone]    Physician Discharge Summary     Patient ID:  Deandra Fam  [id number]  78 y.o.  1950    Admit date: 5/10/2021    Discharge date and time: 5/14/2021 4:54 PM    Admission Diagnoses: ESRD (end stage renal disease) on dialysis ([address] [N18.6, Z99.2]  Near syncope [R55]  History of cervical spinal surgery [Z98.890]  Community acquired pneumonia of left lower lobe of lung [J18.9]  Septic shock ([address] [A41.9, R65.21]  Hypovolemia [E86.1]  Diarrhea [R19.7]    Discharge Diagnoses:  Principal Diagnosis <principal problem not specified>                                            Active Problems:    Diarrhea (5/11/2021)      Hypovolemia (5/11/2021)      ESRD (end stage renal disease) on dialysis ([address] (5/11/2021)      Near syncope (5/11/2021)      History of cervical spinal surgery (5/11/2021)      Septic shock ([address] (5/11/2021)      Community acquired pneumonia of left lower lobe of lung (5/11/2021)      Hypoxia (5/11/2021)      Encephalopathy in sepsis (5/11/2021)           Hospital Course:     Septic shock / Leukocytosis/ Community acquired pneumonia of left lower lobe of lung: improved, off of IV pressors. Patient persistent with elevated blood pressure here; changed midodrine to dialysis days - f/u nephro OP. Blood cx NTD. MRSA neg. S/p IV vancomycin stopped. Mycoplasma positive; doxycyline course OP. S/p IV Zosyn de-esculated to ceftriaxone and then omnicef on discharge.  Follow Up CXR outpatient in 4-6 weeks. F/u Pulm OP. Acute hypoxic respiratory failure: improving. due to PNA and sepsis as above. Plan as above. F/u PCP and Pulm OP.      Encephalopathy: resolved. Likely due to infection and hypoxia.      Graft Malfunction: vascular unable to de-clot; permcath placed 5/13. F/u vascular OP for permanent access.      Diarrhea : Covid neg.  Monitor      ESRD (end stage renal disease) on dialysis: Resume dialysis schedule. Nephrology f/u OP.      History of cervical spinal surgery: HH.       PCP: Zakiya Hutchins NP     Consults: Pulmonary/Intensive care    Significant Diagnostic Studies:     CXR   IMPRESSION  Left basilar airspace disease and associated pleural effusion. Follow-up to  complete resolution in 4-6 weeks is recommended. Discharge Exam:  Physical Exam:    Gen: frail, in no acute distress  HEENT: PERRL, hearing intact to voice  Resp:  cta  b/l   Card:  RRR, No murmurs, normal S1, S2  Abd:  Soft, non-tender, non-distended, normoactive bowel sounds are present  Musc:  No cyanosis or clubbing  Skin:  No rashes or ulcers  Neuro:  Cranial nerves 3-12 are grossly intact, moves all extremities, follows commands appropriately  Psych:  Somnolent but arousable and Oriented to person, place, and time    Disposition: home  Discharge Condition: Stable    Patient Instructions:   Current Discharge Medication List      START taking these medications    Details   doxycycline (ADOXA) 100 mg tablet Take 1 Tab by mouth two (2) times a day for 7 days. Qty: 14 Tab, Refills: 0      cefdinir (OMNICEF) 300 mg capsule Take 1 Cap by mouth every other day. Qty: 3 Cap, Refills: 0         CONTINUE these medications which have CHANGED    Details   midodrine (PROAMATINE) 5 mg tablet Take 2 Tabs by mouth every Monday, Wednesday, Friday. Qty: 30 Tab, Refills: 0         CONTINUE these medications which have NOT CHANGED    Details   sevelamer (RENAGEL) 800 mg tablet Take 800 mg by mouth three (3) times daily (with meals). ondansetron hcl (Zofran) 4 mg tablet Take 4 mg by mouth every eight (8) hours as needed for Nausea or Vomiting. predniSONE (DELTASONE) 5 mg tablet Take 5 mg by mouth daily. b complex-vitamin c-folic acid (DIALYVITE) 100-1 mg tab tablet Take 1 Tab by mouth daily. megestrol (MEGACE) 40 mg tablet Take 40 mg by mouth two (2) times a day.       timolol (TIMOPTIC) 0.5 % ophthalmic solution Administer 1 Drop to left eye daily. pantoprazole (PROTONIX) 40 mg tablet Take 40 mg by mouth daily. Activity: Activity as tolerated  Diet: Renal Diet  Wound Care: Keep wound clean and dry    Follow-up with  Follow-up Information     Follow up With Specialties Details Why Contact Info    Kristel Cloud NP Nurse Practitioner Schedule an appointment as soon as possible for a visit on 5/17/2021 10:00am [facility]  [phone]      Your dialysis and nephrologist  Go to As scheduled     [address]  [phone]    Palomo Blake MD Pulmonary Disease In 4 weeks Hospital Follow Up and Follow Up Chest xray  [address]  [address] [facility]      Negin Washington MD General and Vascular Surgery Schedule an appointment as soon as possible for a visit in 3 days Dialysis access follow up  [address] [facility]  [facility] [phone]         You will need to get a follow up chest xray in 4-6 weeks either with your primary care doctor or a lung doctor. Follow-up tests/labs as above.      Signed:  Khushi Webster DO  5/14/2021  4:54 PM  **I personally spent 35 min on discharge**

## 2021-05-14 NOTE — PROGRESS NOTES
Came to patients room to initiate HD. New CVC placed yesterday is not aspirating today. Yesterday this worked fine for his HD. Repositioning and coughing does not work. No blood can aspirate from either line. Flushes easily though. Spoke with Dr. Leslie Judge. He ordered Cath flow to dwell. If this does not work then the line will have to be looked at by IR.

## 2021-05-15 NOTE — PROGRESS NOTES
All patient education explained in great detail, multiple times of all discharge instructions, orders and medications patient will take after discharge. New medication prescriptions orders handed to patient and informed he will need to get filled. Patient states he understands all instructions. Assisted patient with getting home clothing on while in bed with Shanae Mcgowan RN. Left upper chest dialysis catheter intact upon discharge and right hand iv removed and dressing applied at site after hemostasis achieved. Report given to EMS crew and patient transported out via stretcher with all belongings and education paperwork, FMLA paperwork and paper perscriptions. Patient e-signed discharge paperwork after verbalizing understanding of all instructions.

## 2021-05-16 LAB
BACTERIA SPEC CULT: NORMAL
SERVICE CMNT-IMP: NORMAL

## 2021-05-17 LAB
BACTERIA SPEC CULT: NORMAL
SERVICE CMNT-IMP: NORMAL

## 2021-05-17 NOTE — OP NOTES
[facility]  OPERATIVE REPORT    Name:  Elda Pace  MR#:  [id number]  :  1950  ACCOUNT #:  [de-identified]  DATE OF SERVICE:  2021    PREOPERATIVE DIAGNOSIS:  End-stage renal disease. POSTOPERATIVE DIAGNOSIS:  End-stage renal disease. PROCEDURE PERFORMED:  Percutaneous thrombectomy of AV graft with balloon angioplasty. SURGEON:  Archnaa Marley MD    ASSISTANT:  0    ANESTHESIA:  IV sedation local anesthesia. COMPLICATIONS:  None. SPECIMENS REMOVED:  None. IMPLANTS:  None. ESTIMATED BLOOD LOSS:  0.    INDICATION FOR PROCEDURE:  The patient is an elderly gentleman with a thrombosed left upper arm AV graft. A decision was made to take him to the cath lab for an attempted thrombectomy. PROCEDURE:  The patient was taken to the cath lab. Once suitable level of anesthesia was introduced, prepped and draped in typical sterile fashion. Crossing sheaths were placed into the graft and multiple passes of the Delfina were undertaken to try to remove the clot. Some flow was achieved. Balloon angioplasty of the venous anastomosis was then performed with an 8-mm angioplasty balloon. At the completion of procedure, there was not adequate flow within, certainly not enough to sustain dialysis. A decision was made to place a central tunneled line which will be dictated as a separate procedure report.       Tyler Weinberg MD      [id number]  D:  2021 11:50  T:  2021 18:46  JOB #:  [id number]

## 2022-03-18 PROBLEM — G93.41 ENCEPHALOPATHY IN SEPSIS: Status: ACTIVE | Noted: 2021-05-11

## 2022-03-18 PROBLEM — D62 ACUTE BLOOD LOSS ANEMIA: Status: ACTIVE | Noted: 2020-03-14

## 2022-03-18 PROBLEM — J18.9 COMMUNITY ACQUIRED PNEUMONIA OF LEFT LOWER LOBE OF LUNG: Status: ACTIVE | Noted: 2021-05-11

## 2022-03-18 PROBLEM — D64.9 CHRONIC ANEMIA: Status: ACTIVE | Noted: 2020-07-01

## 2022-03-19 PROBLEM — E29.1 TESTICULAR HYPOFUNCTION: Status: ACTIVE | Noted: 2020-07-01

## 2022-03-19 PROBLEM — R60.0 BILATERAL LOWER EXTREMITY EDEMA: Status: ACTIVE | Noted: 2018-06-21

## 2022-03-19 PROBLEM — N18.6 STAGE 5 CHRONIC KIDNEY DISEASE ON CHRONIC DIALYSIS (HCC): Status: ACTIVE | Noted: 2020-07-01

## 2022-03-19 PROBLEM — J18.9 RIGHT UPPER LOBE PNEUMONIA: Status: ACTIVE | Noted: 2020-03-14

## 2022-03-19 PROBLEM — R55 NEAR SYNCOPE: Status: ACTIVE | Noted: 2021-05-11

## 2022-03-19 PROBLEM — N52.9 IMPOTENCE: Status: ACTIVE | Noted: 2018-06-21

## 2022-03-19 PROBLEM — A41.9 SEPTIC SHOCK (HCC): Status: ACTIVE | Noted: 2021-05-11

## 2022-03-19 PROBLEM — Z99.2 STAGE 5 CHRONIC KIDNEY DISEASE ON CHRONIC DIALYSIS (HCC): Status: ACTIVE | Noted: 2020-07-01

## 2022-03-19 PROBLEM — R65.21 SEPTIC SHOCK (HCC): Status: ACTIVE | Noted: 2021-05-11

## 2022-03-19 PROBLEM — Z94.0 RENAL TRANSPLANT RECIPIENT: Status: ACTIVE | Noted: 2018-06-21

## 2022-03-19 PROBLEM — Z98.890 HISTORY OF CERVICAL SPINAL SURGERY: Status: ACTIVE | Noted: 2021-05-11

## 2022-03-19 PROBLEM — H54.62 VISION LOSS OF LEFT EYE: Status: ACTIVE | Noted: 2018-06-21

## 2022-03-19 PROBLEM — Z99.2 ESRD (END STAGE RENAL DISEASE) ON DIALYSIS (HCC): Status: ACTIVE | Noted: 2021-05-11

## 2022-03-19 PROBLEM — E86.1 HYPOVOLEMIA: Status: ACTIVE | Noted: 2021-05-11

## 2022-03-19 PROBLEM — M1A.39X0 CHRONIC GOUT OF MULTIPLE SITES DUE TO RENAL IMPAIRMENT: Status: ACTIVE | Noted: 2018-06-21

## 2022-03-19 PROBLEM — R09.02 HYPOXIA: Status: ACTIVE | Noted: 2021-05-11

## 2022-03-19 PROBLEM — K21.9 GERD (GASTROESOPHAGEAL REFLUX DISEASE): Status: ACTIVE | Noted: 2018-06-21

## 2022-03-19 PROBLEM — I48.0 PAF (PAROXYSMAL ATRIAL FIBRILLATION) (HCC): Status: ACTIVE | Noted: 2020-03-15

## 2022-03-19 PROBLEM — N18.6 ESRD (END STAGE RENAL DISEASE) ON DIALYSIS (HCC): Status: ACTIVE | Noted: 2021-05-11

## 2022-03-19 PROBLEM — Z90.79 S/P TURP (STATUS POST TRANSURETHRAL RESECTION OF PROSTATE): Status: ACTIVE | Noted: 2018-06-21

## 2022-03-19 PROBLEM — S70.12XA HEMATOMA OF LEFT THIGH: Status: ACTIVE | Noted: 2020-03-14

## 2022-03-20 PROBLEM — N40.0 BENIGN PROSTATIC HYPERPLASIA WITHOUT LOWER URINARY TRACT SYMPTOMS: Status: ACTIVE | Noted: 2018-06-21

## 2022-03-20 PROBLEM — N18.6 ESRD ON HEMODIALYSIS (HCC): Status: ACTIVE | Noted: 2020-03-14

## 2022-03-20 PROBLEM — I35.9 AORTIC VALVULAR DISORDER: Status: ACTIVE | Noted: 2020-03-14

## 2022-03-20 PROBLEM — R19.7 DIARRHEA: Status: ACTIVE | Noted: 2021-05-11

## 2022-03-20 PROBLEM — Z99.2 ESRD ON HEMODIALYSIS (HCC): Status: ACTIVE | Noted: 2020-03-14

## 2022-03-24 ENCOUNTER — HOSPITAL ENCOUNTER (OUTPATIENT)
Age: 72
Setting detail: OBSERVATION
Discharge: NURSING, REHAB OR LONG-TERM CARE | End: 2022-03-24
Attending: EMERGENCY MEDICINE | Admitting: INTERNAL MEDICINE
Payer: COMMERCIAL

## 2022-03-24 ENCOUNTER — APPOINTMENT (OUTPATIENT)
Dept: GENERAL RADIOLOGY | Age: 72
End: 2022-03-24
Attending: EMERGENCY MEDICINE
Payer: COMMERCIAL

## 2022-03-24 VITALS
TEMPERATURE: 98.6 F | HEART RATE: 78 BPM | DIASTOLIC BLOOD PRESSURE: 72 MMHG | SYSTOLIC BLOOD PRESSURE: 97 MMHG | HEIGHT: 70 IN | WEIGHT: 153.66 LBS | OXYGEN SATURATION: 99 % | RESPIRATION RATE: 14 BRPM | BODY MASS INDEX: 22 KG/M2

## 2022-03-24 DIAGNOSIS — Z99.2 ESRD (END STAGE RENAL DISEASE) ON DIALYSIS (HCC): ICD-10-CM

## 2022-03-24 DIAGNOSIS — M79.89 ARM SWELLING: Primary | ICD-10-CM

## 2022-03-24 DIAGNOSIS — N18.6 ESRD (END STAGE RENAL DISEASE) ON DIALYSIS (HCC): ICD-10-CM

## 2022-03-24 PROBLEM — R60.0 EDEMA OF UPPER EXTREMITY: Status: ACTIVE | Noted: 2022-03-24

## 2022-03-24 LAB
ALBUMIN SERPL-MCNC: 2.6 G/DL (ref 3.5–5)
ALBUMIN/GLOB SERPL: 0.8 {RATIO} (ref 1.1–2.2)
ALP SERPL-CCNC: 130 U/L (ref 45–117)
ALT SERPL-CCNC: 19 U/L (ref 12–78)
ANION GAP SERPL CALC-SCNC: 2 MMOL/L (ref 5–15)
AST SERPL-CCNC: 16 U/L (ref 15–37)
BASOPHILS # BLD: 0.1 K/UL (ref 0–0.1)
BASOPHILS NFR BLD: 2 % (ref 0–1)
BILIRUB SERPL-MCNC: 0.4 MG/DL (ref 0.2–1)
BUN SERPL-MCNC: 24 MG/DL (ref 6–20)
BUN/CREAT SERPL: 7 (ref 12–20)
CALCIUM SERPL-MCNC: 8.4 MG/DL (ref 8.5–10.1)
CHLORIDE SERPL-SCNC: 97 MMOL/L (ref 97–108)
CO2 SERPL-SCNC: 33 MMOL/L (ref 21–32)
COMMENT, HOLDF: NORMAL
CREAT SERPL-MCNC: 3.39 MG/DL (ref 0.7–1.3)
DIFFERENTIAL METHOD BLD: ABNORMAL
EOSINOPHIL # BLD: 0.5 K/UL (ref 0–0.4)
EOSINOPHIL NFR BLD: 11 % (ref 0–7)
ERYTHROCYTE [DISTWIDTH] IN BLOOD BY AUTOMATED COUNT: 16.3 % (ref 11.5–14.5)
GLOBULIN SER CALC-MCNC: 3.2 G/DL (ref 2–4)
GLUCOSE SERPL-MCNC: 100 MG/DL (ref 65–100)
HCT VFR BLD AUTO: 28.5 % (ref 36.6–50.3)
HGB BLD-MCNC: 9.3 G/DL (ref 12.1–17)
IMM GRANULOCYTES # BLD AUTO: 0 K/UL (ref 0–0.04)
IMM GRANULOCYTES NFR BLD AUTO: 0 % (ref 0–0.5)
LACTATE SERPL-SCNC: 0.8 MMOL/L (ref 0.4–2)
LYMPHOCYTES # BLD: 1.6 K/UL (ref 0.8–3.5)
LYMPHOCYTES NFR BLD: 38 % (ref 12–49)
MCH RBC QN AUTO: 33.5 PG (ref 26–34)
MCHC RBC AUTO-ENTMCNC: 32.6 G/DL (ref 30–36.5)
MCV RBC AUTO: 102.5 FL (ref 80–99)
MONOCYTES # BLD: 0.5 K/UL (ref 0–1)
MONOCYTES NFR BLD: 12 % (ref 5–13)
NEUTS SEG # BLD: 1.6 K/UL (ref 1.8–8)
NEUTS SEG NFR BLD: 38 % (ref 32–75)
NRBC # BLD: 0 K/UL (ref 0–0.01)
NRBC BLD-RTO: 0 PER 100 WBC
PLATELET # BLD AUTO: 144 K/UL (ref 150–400)
PMV BLD AUTO: 11 FL (ref 8.9–12.9)
POTASSIUM SERPL-SCNC: 3.8 MMOL/L (ref 3.5–5.1)
PROT SERPL-MCNC: 5.8 G/DL (ref 6.4–8.2)
RBC # BLD AUTO: 2.78 M/UL (ref 4.1–5.7)
SAMPLES BEING HELD,HOLD: NORMAL
SODIUM SERPL-SCNC: 132 MMOL/L (ref 136–145)
WBC # BLD AUTO: 4.3 K/UL (ref 4.1–11.1)

## 2022-03-24 PROCEDURE — 71045 X-RAY EXAM CHEST 1 VIEW: CPT

## 2022-03-24 PROCEDURE — 83605 ASSAY OF LACTIC ACID: CPT

## 2022-03-24 PROCEDURE — 85025 COMPLETE CBC W/AUTO DIFF WBC: CPT

## 2022-03-24 PROCEDURE — 87040 BLOOD CULTURE FOR BACTERIA: CPT

## 2022-03-24 PROCEDURE — 36415 COLL VENOUS BLD VENIPUNCTURE: CPT

## 2022-03-24 PROCEDURE — 80053 COMPREHEN METABOLIC PANEL: CPT

## 2022-03-24 PROCEDURE — 75810000275 HC EMERGENCY DEPT VISIT NO LEVEL OF CARE

## 2022-03-24 PROCEDURE — G0378 HOSPITAL OBSERVATION PER HR: HCPCS

## 2022-03-24 RX ORDER — ACETAMINOPHEN 650 MG/1
650 SUPPOSITORY RECTAL
Status: DISCONTINUED | OUTPATIENT
Start: 2022-03-24 | End: 2022-03-24 | Stop reason: HOSPADM

## 2022-03-24 RX ORDER — SODIUM CHLORIDE 0.9 % (FLUSH) 0.9 %
5-40 SYRINGE (ML) INJECTION EVERY 8 HOURS
Status: DISCONTINUED | OUTPATIENT
Start: 2022-03-24 | End: 2022-03-24 | Stop reason: HOSPADM

## 2022-03-24 RX ORDER — ONDANSETRON 2 MG/ML
4 INJECTION INTRAMUSCULAR; INTRAVENOUS
Status: DISCONTINUED | OUTPATIENT
Start: 2022-03-24 | End: 2022-03-24 | Stop reason: HOSPADM

## 2022-03-24 RX ORDER — ONDANSETRON 4 MG/1
4 TABLET, ORALLY DISINTEGRATING ORAL
Status: DISCONTINUED | OUTPATIENT
Start: 2022-03-24 | End: 2022-03-24 | Stop reason: HOSPADM

## 2022-03-24 RX ORDER — HEPARIN SODIUM 5000 [USP'U]/ML
5000 INJECTION, SOLUTION INTRAVENOUS; SUBCUTANEOUS EVERY 8 HOURS
Status: DISCONTINUED | OUTPATIENT
Start: 2022-03-24 | End: 2022-03-24 | Stop reason: HOSPADM

## 2022-03-24 RX ORDER — SODIUM CHLORIDE 0.9 % (FLUSH) 0.9 %
5-40 SYRINGE (ML) INJECTION AS NEEDED
Status: DISCONTINUED | OUTPATIENT
Start: 2022-03-24 | End: 2022-03-24 | Stop reason: HOSPADM

## 2022-03-24 RX ORDER — MIDODRINE HYDROCHLORIDE 5 MG/1
10 TABLET ORAL
Status: DISCONTINUED | OUTPATIENT
Start: 2022-03-25 | End: 2022-03-24 | Stop reason: HOSPADM

## 2022-03-24 RX ORDER — TIMOLOL MALEATE 5 MG/ML
1 SOLUTION/ DROPS OPHTHALMIC DAILY
Status: DISCONTINUED | OUTPATIENT
Start: 2022-03-24 | End: 2022-03-24 | Stop reason: HOSPADM

## 2022-03-24 RX ORDER — POLYETHYLENE GLYCOL 3350 17 G/17G
17 POWDER, FOR SOLUTION ORAL DAILY PRN
Status: DISCONTINUED | OUTPATIENT
Start: 2022-03-24 | End: 2022-03-24 | Stop reason: HOSPADM

## 2022-03-24 RX ORDER — ACETAMINOPHEN 325 MG/1
650 TABLET ORAL
Status: DISCONTINUED | OUTPATIENT
Start: 2022-03-24 | End: 2022-03-24 | Stop reason: HOSPADM

## 2022-03-24 RX ORDER — PANTOPRAZOLE SODIUM 40 MG/1
40 TABLET, DELAYED RELEASE ORAL DAILY
Status: DISCONTINUED | OUTPATIENT
Start: 2022-03-24 | End: 2022-03-24 | Stop reason: HOSPADM

## 2022-03-24 NOTE — PROGRESS NOTES
Physical Therapy 3/24/2022    Orders received and chart reviewed up to date. Pt currently in ED. Per CM, pt discharging back to [facility] today. Will follow-up as appropriate/if still here tomorrow. Thank you.   Nereyda Obrien, PT, DPT

## 2022-03-24 NOTE — ED NOTES
Bedside and Verbal shift change report given to Demarcus Howard RN (oncoming nurse) by Frank Benavidez (offgoing nurse). Report included the following information SBAR, Kardex, ED Summary, Intake/Output, MAR, Cardiac Rhythm NSR, Alarm Parameters  and Quality Measures.

## 2022-03-24 NOTE — ED NOTES
Pharmacy contacted for med rec per MD request. Pharmacy states they will do the med rec asap, but may not be able to do it today.

## 2022-03-24 NOTE — ED NOTES
This RN reviewed patient chart with Aneesh Hanks, from [facility] in [facility] regarding patient status and care that he received while in emergency department. Patient and [facility] transport expressed concern with care received and states patient claims he received no care while hospitalized. Reviewed hospitalization with RN at facility to clarify with patient and informed her to follow with us for further question.

## 2022-03-24 NOTE — CONSULTS
Nephrology Progress Note  Gregorio Iyer  Date of Admission : 3/24/2022    CC:  Follow up for ESRD       Assessment and Plan     ESRD on HD MWF ([facility])  Chronic hypotension  Chronic RUE swelling  Subclavian vein  Stenosis s/p angioplasty and stent  History of failed renal transplant  Anemia       Plan:  No plans for intervention  No urgent need for dialysis  Plans for outpatient angioplasty  Can be d/c'd back to [facility] and get dialysis tomorrow per routine       Interval History: This is a 74 yo WM w/ a hx of ESRD on HD MWF, chronic hypotension, spinal stenosis, chronic RUE swelling, subclavian vein stensosis s/p angio and stent. Was at [facility], sent here for swelling. Last HD was yesterday. No issues with dialysis. Access was functioning well. No cp, sob, n/v/d reported at this time. Current Medications: all current  Medications have been eviewed in EPIC  Review of Systems: Pertinent items are noted in HPI. Objective:  Vitals:    Vitals:    03/24/22 0245 03/24/22 0445 03/24/22 0616 03/24/22 0746   BP: 102/64  (!) 155/78 97/72   Pulse: 89 65 78 78   Resp: 13 15 12 14   Temp:       SpO2:  92% 100% 99%   Weight:       Height:         Intake and Output:  No intake/output data recorded. No intake/output data recorded. Physical Examination:    General: NAD,Conversant   Neck:  Supple, no mass  Resp:  Lungs CTA B/L, no wheezing , normal respiratory effort  CV:  RRR,  no murmur or rub, no lower ext LE edema  GI:  Soft, NT, + Bowel sounds, no hepatosplenomegaly  Neurologic:  Non focal  Psych:             AAO x 3 appropriate affect   Skin:  No Rash  Access:           RUE arm graft +t/b    []    High complexity decision making was performed  []    Patient is at high-risk of decompensation with multiple organ involvement    Lab Data Personally Reviewed: I have reviewed all the pertinent labs, microbiology data and radiology studies during assessment.     Recent Labs     03/24/22  0236   *   K 3.8   CL 97 CO2 33*      BUN 24*   CREA 3.39*   CA 8.4*   ALB 2.6*   ALT 19     Recent Labs     03/24/22  0236   WBC 4.3   HGB 9.3*   HCT 28.5*   *     No results found for: SDES  Lab Results   Component Value Date/Time    Culture result: NO GROWTH AFTER 2 HOURS 03/24/2022 02:32 AM    Culture result: NO GROWTH 6 DAYS 05/11/2021 03:15 AM    Culture result: MRSA NOT PRESENT 05/11/2021 03:15 AM    Culture result:  05/11/2021 03:15 AM     Screening of patient nares for MRSA is for surveillance purposes and, if positive, to facilitate isolation considerations in high risk settings. It is not intended for automatic decolonization interventions per se as regimens are not sufficiently effective to warrant routine use. Recent Results (from the past 24 hour(s))   LACTIC ACID    Collection Time: 03/24/22  2:32 AM   Result Value Ref Range    Lactic acid 0.8 0.4 - 2.0 MMOL/L   CULTURE, BLOOD, PAIRED    Collection Time: 03/24/22  2:32 AM    Specimen: Blood   Result Value Ref Range    Special Requests: NO SPECIAL REQUESTS      Culture result: NO GROWTH AFTER 2 HOURS     CBC WITH AUTOMATED DIFF    Collection Time: 03/24/22  2:36 AM   Result Value Ref Range    WBC 4.3 4.1 - 11.1 K/uL    RBC 2.78 (L) 4.10 - 5.70 M/uL    HGB 9.3 (L) 12.1 - 17.0 g/dL    HCT 28.5 (L) 36.6 - 50.3 %    .5 (H) 80.0 - 99.0 FL    MCH 33.5 26.0 - 34.0 PG    MCHC 32.6 30.0 - 36.5 g/dL    RDW 16.3 (H) 11.5 - 14.5 %    PLATELET 341 (L) 412 - 400 K/uL    MPV 11.0 8.9 - 12.9 FL    NRBC 0.0 0  WBC    ABSOLUTE NRBC 0.00 0.00 - 0.01 K/uL    NEUTROPHILS 38 32 - 75 %    LYMPHOCYTES 38 12 - 49 %    MONOCYTES 12 5 - 13 %    EOSINOPHILS 11 (H) 0 - 7 %    BASOPHILS 2 (H) 0 - 1 %    IMMATURE GRANULOCYTES 0 0.0 - 0.5 %    ABS. NEUTROPHILS 1.6 (L) 1.8 - 8.0 K/UL    ABS. LYMPHOCYTES 1.6 0.8 - 3.5 K/UL    ABS. MONOCYTES 0.5 0.0 - 1.0 K/UL    ABS. EOSINOPHILS 0.5 (H) 0.0 - 0.4 K/UL    ABS. BASOPHILS 0.1 0.0 - 0.1 K/UL    ABS. IMM.  GRANS. 0.0 0.00 - 0.04 K/UL    DF AUTOMATED     METABOLIC PANEL, COMPREHENSIVE    Collection Time: 03/24/22  2:36 AM   Result Value Ref Range    Sodium 132 (L) 136 - 145 mmol/L    Potassium 3.8 3.5 - 5.1 mmol/L    Chloride 97 97 - 108 mmol/L    CO2 33 (H) 21 - 32 mmol/L    Anion gap 2 (L) 5 - 15 mmol/L    Glucose 100 65 - 100 mg/dL    BUN 24 (H) 6 - 20 MG/DL    Creatinine 3.39 (H) 0.70 - 1.30 MG/DL    BUN/Creatinine ratio 7 (L) 12 - 20      GFR est AA 22 (L) >60 ml/min/1.73m2    GFR est non-AA 18 (L) >60 ml/min/1.73m2    Calcium 8.4 (L) 8.5 - 10.1 MG/DL    Bilirubin, total 0.4 0.2 - 1.0 MG/DL    ALT (SGPT) 19 12 - 78 U/L    AST (SGOT) 16 15 - 37 U/L    Alk. phosphatase 130 (H) 45 - 117 U/L    Protein, total 5.8 (L) 6.4 - 8.2 g/dL    Albumin 2.6 (L) 3.5 - 5.0 g/dL    Globulin 3.2 2.0 - 4.0 g/dL    A-G Ratio 0.8 (L) 1.1 - 2.2     SAMPLES BEING HELD    Collection Time: 03/24/22  2:36 AM   Result Value Ref Range    SAMPLES BEING HELD 1red,1blue     COMMENT        Add-on orders for these samples will be processed based on acceptable specimen integrity and analyte stability, which may vary by analyte. Judi Haas MD  [address]  Phone - [phone]   Fax - [phone]  [email or web address]

## 2022-03-24 NOTE — CONSULTS
[address]    Name:  Abdulaziz Boo  MR#:  [id number]  :  1950  ACCOUNT #:  [de-identified]  DATE OF SERVICE:  2022    REASON FOR CONSULTATION:  End-stage renal disease, with right upper arm swelling. HISTORY OF PRESENT ILLNESS:  The patient is a 72-year-old male with end-stage renal disease, on hemodialysis. He has a functioning right upper arm dialysis graft. He has chronic right arm swelling. He has known subclavian vein stenosis and has had a previous stent placed in the subclavian vein. He had a fistulogram as recently as 2022 that showed a recurrent stenosis in the stent. This was treated with balloon angioplasty. He was apparently referred to the emergency room because of his arm swelling. He has no other complaints. PAST MEDICAL HISTORY:  1. Renal failure, on dialysis. 2.  Hypertension. ADMISSION MEDICATIONS:  1.  Midodrine. 2.  Renagel. 3.  Prednisone. 4.  Megace. ALLERGIES:  LEVAQUIN. SOCIAL HISTORY:  The patient was apparently transferred here from a rehab facility. FAMILY HISTORY:  Unremarkable. REVIEW OF SYSTEMS:  Unremarkable. PHYSICAL EXAMINATION:  GENERAL:  He is a chronically ill-appearing male in no distress. He is awake, alert, and responsive. EXTREMITIES:  He has moderate swelling of the right arm with a functioning right upper arm dialysis graft. NEUROLOGIC:  Exam is grossly normal with intact motor and sensory function. IMPRESSION:  The patient has chronic right arm swelling related to a functioning right upper arm dialysis access with subclavian vein stenosis. He will need another angiogram and further angioplasty or stent placement. He does not need to be in the hospital for this procedure. We can make arrangements for this to be done as an outpatient next week. He can continue dialysis as usual using his arm graft.       Manoj Teixeira MD      [id number]  D:  2022 11:28  T: 03/24/2022 12:34  JOB #:  [id number]

## 2022-03-24 NOTE — DISCHARGE SUMMARY
Discharge Summary       PATIENT ID: Drea Thompson  MRN: [id number]   YOB: 1950    DATE OF ADMISSION: 3/24/2022  1:18 AM    DATE OF DISCHARGE: 03/24/22     PRIMARY CARE PROVIDER: Ning Rivas NP     DISCHARGING PROVIDER: Irvin Guillen MD    To contact this individual call [phone] and ask the  to page. If unavailable ask to be transferred the Adult Hospitalist Department. CONSULTATIONS: IP CONSULT TO VASCULAR SURGERY  IP CONSULT TO NEPHROLOGY    PROCEDURES/SURGERIES: * No surgery found *    DISCHARGE DIAGNOSES:   RUE swelling - chronic           ADMISSION SUMMARY AND HOSPITAL COURSE:   Drea Thompson is a 73 y.o. male with pmh of HTN, ESRD on HD, h/o spinal surgery about 1 year ago who presents with RUE swelling and concern for graft malfunction. Pt was seen by his ortho surgeon a few weeks ago, and set up with inpatient [facility] rehab. Prior to rehab he was unable to transition himself from wheelchair to bed, etc. And went to [facility] to gain more independence. Pt's RUE fistula was placed at [facility] about 1 year ago, and ever since it being placed he has had RUE swelling. It was noted to be significantly worse both by the patient and by the attending physician at [address] [place] [address], as well as his nephrologist, and he was transferred to [facility] ER for further evaluation. Reported to have tolerated dialysis yesterday, however was discontinued for low BP per the patient. RN received report from [facility] that it was discontinued due to worsening R arm swelling while on the dialysis machine. Pt denies any recent fevers, chills. He does not have any acute RUE pain. Denies any SOB, CP, N/V/D/C. Has no additional areas of swelling. Admitted and seen by vascular surgery. No concerns for any acute issues. Fistula functions well. This is a chronic issue for him. He may be DC back to [facility], and then follow up as an outpatinet.      DISCHARGE DIAGNOSES / PLAN: RUE swelling   Concern for graft malfunction - no thrill   Subclavian stenosis  - Vascular surgery consulted, no concerns for malfunction. This is a chronic issue for him  - US of upper extremity graft    No changes to home medications, [facility]    BMI: Body mass index is 22.05 kg/m². . This patient: Has a BMI within normal limits. PENDING TEST RESULTS:   At the time of discharge the following test results are still pending: None     ADDITIONAL CARE RECOMMENDATIONS:   - Please take all medications as prescribed. Note changes as below. **No changes  - Please make sure to follow up with your primary care physician within 1-2 weeks of discharge for hospital follow up. You should also follow up with Dr. Bettie Sinha in about 1 week  - Please make sure to continue to monitor for: Chest pain, shortness of breath, high fevers,  and return to the Emergency Department with any of these symptoms.   - Please get up slowly from a seated or laying position, avoid falls. - Avoid tobacco, alcohol and other illicit drug use. - Diet restrictions: Resume prior diet   - Activity restrictions: None, resume prior   - Wound care: None          NOTIFY YOUR PHYSICIAN FOR ANY OF THE FOLLOWING:   Fever over 101 degrees for 24 hours. Chest pain, shortness of breath, fever, chills, nausea, vomiting, diarrhea, change in mentation, falling, weakness, bleeding. Severe pain or pain not relieved by medications, as well as any other signs or symptoms that you may have questions about.     FOLLOW UP APPOINTMENTS:    Follow-up Information     Follow up With Specialties Details Why Contact Info    Elissa Luo NP Nurse Practitioner   [address] [facility]  [phone]      Iglesia Rincon MD Vascular Surgery In 1 week For outpatient angiogram  [address] [facility]  [phone]               DIET: Resume previous diet    ACTIVITY: Activity as tolerated    EQUIPMENT needed: None    DISCHARGE MEDICATIONS:  Current Discharge Medication List      CONTINUE these medications which have NOT CHANGED    Details   midodrine (PROAMATINE) 5 mg tablet Take 2 Tabs by mouth every Monday, Wednesday, Friday. Qty: 30 Tab, Refills: 0      sevelamer (RENAGEL) 800 mg tablet Take 800 mg by mouth three (3) times daily (with meals). b complex-vitamin c-folic acid (DIALYVITE) 100-1 mg tab tablet Take 1 Tab by mouth daily. timolol (TIMOPTIC) 0.5 % ophthalmic solution Administer 1 Drop to left eye daily. pantoprazole (PROTONIX) 40 mg tablet Take 40 mg by mouth daily.          STOP taking these medications       cefdinir (OMNICEF) 300 mg capsule Comments:   Reason for Stopping:         ondansetron hcl (Zofran) 4 mg tablet Comments:   Reason for Stopping:         predniSONE (DELTASONE) 5 mg tablet Comments:   Reason for Stopping:         megestrol (MEGACE) 40 mg tablet Comments:   Reason for Stopping:               DISPOSITION:    Home With:   OT  PT  HH  RN      X Long term SNF/Inpatient Rehab    Independent/assisted living    Hospice    Other:       PATIENT CONDITION AT DISCHARGE:     Functional status   X Poor     Deconditioned     Independent      Cognition   X Lucid     Forgetful     Dementia      Catheters/lines (plus indication)    Clemens     PICC     PEG    X None      Code status   X  Full code     DNR      PHYSICAL EXAMINATION AT DISCHARGE:  Visit Vitals  BP 97/72   Pulse 78   Temp 98.6 °F (37 °C)   Resp 14   Ht 5' 10\" (1.778 m)   Wt 69.7 kg (153 lb 10.6 oz)   SpO2 99%   BMI 22.05 kg/m²     Gen: NAD, awake in bed  HEENT: NC/AT, sclera anicteric, PERRL, EOMI  CV: RRR no m/r/g, normal S1 and S2, no pedal edema   Resp: CTA b/l no increased work of breathing, no wheezing or rhonchi, speaking in full sentences   Abd: NT/ND, normal bowel sounds, no rebound or guarding  Ext: 2+ pulses, RUE edema   Skin: No rashes or lesions        CHRONIC MEDICAL DIAGNOSES:  Problem List as of 3/24/2022 Date Reviewed: 10/14/2020          Codes Class Noted - Resolved    Edema of upper extremity ICD-10-CM: R60.0  ICD-9-CM: 782.3  3/24/2022 - Present        Diarrhea ICD-10-CM: R19.7  ICD-9-CM: 787.91  5/11/2021 - Present        Hypovolemia ICD-10-CM: E86.1  ICD-9-CM: 276.52  5/11/2021 - Present        ESRD (end stage renal disease) on dialysis [facility]) ICD-10-CM: N18.6, Z99.2  ICD-9-CM: 585.6, V45.11  5/11/2021 - Present        Near syncope ICD-10-CM: R55  ICD-9-CM: 780.2  5/11/2021 - Present        History of cervical spinal surgery ICD-10-CM: Z98.890  ICD-9-CM: V45.89  5/11/2021 - Present        Septic shock (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 H/H on 10/14/2020 was 12.7/37.3                 Stage 5 chronic kidney disease on chronic dialysis [facility]) ICD-10-CM: N18.6, Z99.2  ICD-9-CM: 585.6, V45.11  7/1/2020 - Present    Overview Addendum 10/15/2020  1:53 PM by Kiara Garcia NP     He has failed bilateral renal transplants, and is dialysis dependent at this time. He is followed by Dr. Porfirio Ward, nephrology. His creatinine on 10/5/18 was 6.37  His creatinine on 12/19/19 was 6.65. Creatinine on 3/31/2020 was 6.41.   Creatinine on 6/10/2020 was 3.84  Creatinine on 10/14/2020 was 5.19             Testicular hypofunction ICD-10-CM: E29.1  ICD-9-CM: 257.2  7/1/2020 - Present    Overview Addendum 11/25/2020  8:38 AM by Kiara Garcia NP He was last seen by a provider on 7/9/2020 via virtual visit. Started on TRT on 1/14/2020. He felt more energy. He has not seen much improvement in sexual function. He still has no libido. His levels were >1300 at [facility]. He thinks it may not have been a stas after questioning. He has more fatigue. Dosing changed from q 2 weeks to q 3 weeks on 7/9/2020. Testosterone was increased from q 3 week injections to q 2 week injections at visit 10/14/2020. Anemia secondary to CKD- dialysis dependent. PSA ok at 0.3   Testosterone level on 10/14/2020 was 159. PAF (paroxysmal atrial fibrillation) (HCC) ICD-10-CM: I48.0  ICD-9-CM: 427.31  3/15/2020 - Present        Right upper lobe pneumonia ICD-10-CM: J18.9  ICD-9-CM: 486  3/14/2020 - Present        Hematoma of left thigh ICD-10-CM: P38.86MR  ICD-9-CM: 924.00  3/14/2020 - Present        Acute blood loss anemia ICD-10-CM: D62  ICD-9-CM: 285.1  3/14/2020 - Present        ESRD on hemodialysis ([facility] [address] ICD-10-CM: N18.6, Z99.2  ICD-9-CM: 585.6, V45.11  3/14/2020 - Present        Aortic valvular disorder ICD-10-CM: I35.9  ICD-9-CM: 424.1  3/14/2020 - Present        Renal transplant recipient ICD-10-CM: Z94.0  ICD-9-CM: V42.0  6/21/2018 - Present        Bilateral lower extremity edema ICD-10-CM: R60.0  ICD-9-CM: 782.3  6/21/2018 - Present        Impotence ICD-10-CM: N52.9  ICD-9-CM: 607.84  6/21/2018 - Present    Overview Addendum 10/12/2020  2:43 PM by David Wang NP     He has a history of impotence, on TRT. He notes no real improvement in sexual function or libido. RX provided for sildenafil on 7/9/2020. GERD (gastroesophageal reflux disease) ICD-10-CM: K21.9  ICD-9-CM: 530.81  6/21/2018 - Present        S/P TURP (status post transurethral resection of prostate) ICD-10-CM: Z90.79  ICD-9-CM: V45.89  6/21/2018 - Present    Overview Signed 11/25/2020  8:39 AM by David Wang, NP     No longer voiding; on dialysis.   PSA 10/14/2020 was 0.3             Benign prostatic hyperplasia without lower urinary tract symptoms ICD-10-CM: N40.0  ICD-9-CM: 600.00  6/21/2018 - Present    Overview Addendum 10/15/2020  1:52 PM by Cara Davis NP     He has a history of BPH. He is s/p TURP on 6/12/13. He had benign prostate specimens. He has ESRD and no longer voids. PSA on 12/19/19 was <0.1. PSA on 10/14/2020 was 0.3             Vision loss of left eye ICD-10-CM: H54.62  ICD-9-CM: 369.8  6/21/2018 - Present        Chronic gout of multiple sites due to renal impairment ICD-10-CM: M1A. 39X0  ICD-9-CM: 274.02  6/21/2018 - Present              Greater than 30 minutes were spent with the patient on counseling and coordination of care    Signed:   Alli Mae MD  3/24/2022  2:33 PM

## 2022-03-24 NOTE — ED PROVIDER NOTES
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 Per the physician at [facility] the nephrologist was concerned that his arm has doubled in size yesterday. They are able to use the graft and he got dialysis today although it was cut short due to an episode of tachycardia and hypotension. Patient denies a fever. Patient states his arm is always been swollen but is unclear whether it is significantly worse or not today. According to the doctor at [facility] it is. She states the nephrologist wanted him to come to the ER to be evaluated by vascular surgery. Patient has no complaints. 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 12/19/19 hGb is 12.4 with HcT 36.5 His chronic anemia could be contributory to his fatigue. H/H ok on Cassandra 10, 2020 labs.  Chronic GERD     Chronic kidney disease     Erectile dysfunction     Gout     Hypertension     Ill-defined condition     dialysis access to left arm    Inflammatory bowel disease     Meningitis     Renal transplant recipient     S/P TURP 2015    Stage 5 chronic kidney disease on chronic dialysis ([facility] [address] 7/1/2020    He has failed bilateral renal transplants, and is dialysis dependent at this time. He is followed by Dr. Terry Barrios, nephrology. His creatinine on 10/5/18 was 6.37 His creatinine on 12/19/19 was 6.65. Creatinine on 3/31/2020 was 6.41. Creatinineo on 6/10/2020 was 3.84    Testicular hypofunction 7/1/2020    He was last seen by a provider on 5/28/2020 via virtual visit. His testosterone level was found to be 79 on 3/31/2020.  His levels have not improved on 200 mg IM q 2 weeks. He still notes fatigue and ED, though somewhat improved. LFT ok (3/31/2020). 6/10/2020 testosterone ([facility]) is 1304. Repeat test on 6/25/2020 testosterone 1059 ([facility]) with free at 24.1.   He has a history of fatigue, low libido,    Thyroid disease     Tobacco use disorder        Past Surgical History:   Procedure Laterality Date    HX CATARACT REMOVAL      HX HEART VALVE SURGERY      HX HERNIA REPAIR      HX TURP  2015    WA APPENDECTOMY      WA INSJ TUNNELED CVC W/O SUBQ PORT/ AGE 5 YR/> N/A 5/13/2021    insertion tunneled central venous catheter -  perm cath performed by Long Lima MD at [address] CATH LAB    WA INTRO CATH DIALYSIS CIRCUIT DX [address] [place] S&I N/A 5/13/2021    FISTULOGRAM LEFT performed by Long Lima MD at [address] CATH LAB    [place] TRANSPLANTATION OF KIDNEY Bilateral 1971,2005    x2    WA TRLUML BALO ANGIOP CTR DIALYSIS SEG W/IMG S&I N/A 5/13/2021    Pta Central Vein Av Graft performed by Long Lima MD at [address] CATH LAB         Family History:   Problem Relation Age of Onset    Heart Disease Mother     Lung Disease Father     No Known Problems Sister     No Known Problems Brother     No Known Problems Brother        Social History     Socioeconomic History    Marital status:      Spouse name: Jessica Weems Number of children: 3    Years of education: 13    Highest education level: Not on file   Occupational History    Occupation: Retired    Tobacco Use    Smoking status: Current Every Day Smoker     Packs/day: 1.00     Years: 40.00     Pack years: 40.00     Types: Cigarettes    Smokeless tobacco: Never Used   Substance and Sexual Activity    Alcohol use: Not Currently     Comment: socially    Drug use: No    Sexual activity: Yes   Other Topics Concern     Service Not Asked    Blood Transfusions Not Asked    Caffeine Concern Not Asked    Occupational Exposure Not Asked    Hobby Hazards Not Asked    Sleep Concern Not Asked    Stress Concern Not Asked    Weight Concern Not Asked    Special Diet Not Asked    Back Care Not Asked    Exercise Not Asked    Bike Helmet Not Asked   [address] Not Asked    Self-Exams Not Asked   Social History Narrative    Not on file     Social Determinants of Health     Financial Resource Strain:     Difficulty of Paying Living Expenses: Not on file   Food Insecurity:     Worried About Running Out of Food in the Last Year: Not on file    Hannah of Food in the Last Year: Not on file   Transportation Needs:     Lack of Transportation (Medical): Not on file    Lack of Transportation (Non-Medical): Not on file   Physical Activity:     Days of Exercise per Week: Not on file    Minutes of Exercise per Session: Not on file   Stress:     Feeling of Stress : Not on file   Social Connections:     Frequency of Communication with Friends and Family: Not on file    Frequency of Social Gatherings with Friends and Family: Not on file    Attends Mandaeism Services: Not on file    Active Member of [address] [place] [address] or Organizations: Not on file    Attends Club or Organization Meetings: Not on file    Marital Status: Not on file   Intimate Partner Violence:     Fear of Current or Ex-Partner: Not on file    Emotionally Abused: Not on file    Physically Abused: Not on file    Sexually Abused: Not on file   Housing Stability:     Unable to Pay for Housing in the Last Year: Not on file    Number of [place] in the Last Year: Not on file    Unstable Housing in the Last Year: Not on file         ALLERGIES: Levaquin [levofloxacin]    Review of Systems   Constitutional: Negative for fever. HENT: Negative for congestion. Eyes: Negative for visual disturbance. Respiratory: Negative for cough and shortness of breath. Cardiovascular: Negative for chest pain. Gastrointestinal: Negative for abdominal pain, nausea and vomiting. Endocrine: Negative for polyuria.    Genitourinary: Negative for dysuria. Skin: Negative for wound. Neurological: Negative for headaches. Psychiatric/Behavioral: Negative for dysphoric mood. Vitals:    03/24/22 0115   BP: (!) 112/45   Pulse: 100   Resp: 20   Temp: 98.6 °F (37 °C)   SpO2: 92%   Weight: 69.7 kg (153 lb 10.6 oz)   Height: 5' 10\" (1.778 m)            Physical Exam  Constitutional:       General: He is not in acute distress. Appearance: He is well-developed. HENT:      Head: Normocephalic and atraumatic. Mouth/Throat:      Pharynx: No oropharyngeal exudate. Eyes:      General: No scleral icterus. Right eye: No discharge. Left eye: No discharge. Pupils: Pupils are equal, round, and reactive to light. Neck:      Vascular: No JVD. Cardiovascular:      Rate and Rhythm: Normal rate and regular rhythm. Heart sounds: Normal heart sounds. No murmur heard. Pulmonary:      Effort: Pulmonary effort is normal. No respiratory distress. Breath sounds: Normal breath sounds. No stridor. No wheezing or rales. Chest:      Chest wall: No tenderness. Abdominal:      General: Bowel sounds are normal. There is no distension. Palpations: Abdomen is soft. There is no mass. Tenderness: There is no abdominal tenderness. There is no guarding or rebound. Musculoskeletal:         General: Swelling (right arm. No palpable thrill over graft. NO overlying erythema or warmth) present. Normal range of motion. Cervical back: Normal range of motion and neck supple. Skin:     General: Skin is warm and dry. Capillary Refill: Capillary refill takes less than 2 seconds. Findings: No rash. Neurological:      Mental Status: He is oriented to person, place, and time. Psychiatric:         Behavior: Behavior normal.         Thought Content:  Thought content normal.         Judgment: Judgment normal.          MDM       Procedures      Will check labs including lactate and admit for vascular surgery eval in am.        normal WBC and lactate. Perfect Serve Consult for Admission  3:33 AM    ED Room Number: ER15/15  Patient Name and age:  Jamilah Cox 73 y.o.  male  Working Diagnosis:   1. Arm swelling    2. ESRD (end stage renal disease) on dialysis ([facility] [address]        COVID-19 Suspicion:  no  Sepsis present:  no  Reassessment needed: no  Code Status:  Full Code  Readmission: no  Isolation Requirements:  no  Recommended Level of Care:  med/surg  Department:[facility] Adult ED - 21   Other:  73year old male with paraplegia, ESRD m/w/f [facility] with concern for vascular shunt infection. They state his arm is twice as swollen as normal, had episode of tachycardia and hypotension during dialysis and state the nephrologist wanted him seen urgently by vascular. Arm is markedly swollen (not tense or erythematous) no fever, normal WBC and lactate. He has not been hypotensive here. Heart rate around 100.

## 2022-03-24 NOTE — PROGRESS NOTES
CM Spoke with [facility]  [phone]    Medicare Outpatient Observation Notice (MOON)/ [place] Outpatient Observation Notice ([place]) provided to patient/representative with verbal explanation of the notice. Time allotted for questions regarding the notice. Patient /representative provided a completed copy of the MOON/VOON notice. Copy placed on bedside chart. [facility]/Liaison verified that [facility] can accept him back. RN to call report to [address] [place] [address]  [phone]. He will be returning to Room 3036.     Haider Vizcarra RN, BSN, [facility]  ED Care Management  [phone]

## 2022-03-24 NOTE — ED NOTES
Report called to Kaila Caicedo RN at [employer]  in [address] [place] [address].  Patient leaving via [facility] transport

## 2022-03-24 NOTE — H&P
[address] [facility]'s Adult  Hospitalist Group  History and Physical    Date of Service:  3/24/2022  Primary Care Provider: Stacy Harry NP  Source of information: The patient and Chart review    Chief Complaint: Hand Swelling      History of Presenting Illness:   Jamel Wing is a 73 y.o. male with pmh of HTN, ESRD on HD, h/o spinal surgery about 1 year ago who presents with RUE swelling and concern for graft malfunction. Pt was seen by his ortho surgeon a few weeks ago, and set up with inpatient [facility] rehab. Prior to rehab he was unable to transition himself from wheelchair to bed, etc. And went to [facility] to gain more independence. Pt's RUE fistula was placed at [facility] about 1 year ago, and ever since it being placed he has had RUE swelling. It was noted to be significantly worse both by the patient and by the attending physician at [facility], as well as his nephrologist, and he was transferred to [address] [place] [address] ER for further evaluation. Reported to have tolerated dialysis yesterday, however was discontinued for low BP per the patient. RN received report from [facility] that it was discontinued due to worsening R arm swelling while on the dialysis machine. Pt denies any recent fevers, chills. He does not have any acute RUE pain. Denies any SOB, CP, N/V/D/C. Has no additional areas of swelling. In the ER he was admitted for concern for ?graft infection? WBC are normal, afebrile. No pain or erythema. REVIEW OF SYSTEMS:  A comprehensive review of systems was negative except for that written in the History of Present Illness. 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 12/19/19 hGb is 12.4 with HcT 36.5 His chronic anemia could be contributory to his fatigue. H/H ok on Cassandra 10, 2020 labs.     Chronic GERD     Chronic kidney disease     Erectile dysfunction     Gout     Hypertension     Ill-defined condition     dialysis access to left arm    Inflammatory bowel disease     Meningitis     Renal transplant recipient     S/P TURP 2015    Stage 5 chronic kidney disease on chronic dialysis ([facility] [address] 7/1/2020    He has failed bilateral renal transplants, and is dialysis dependent at this time. He is followed by Dr. Mindy Sanchez, nephrology. His creatinine on 10/5/18 was 6.37 His creatinine on 12/19/19 was 6.65. Creatinine on 3/31/2020 was 6.41. Creatinineo on 6/10/2020 was 3.84    Testicular hypofunction 7/1/2020    He was last seen by a provider on 5/28/2020 via virtual visit. His testosterone level was found to be 79 on 3/31/2020. His levels have not improved on 200 mg IM q 2 weeks. He still notes fatigue and ED, though somewhat improved. LFT ok (3/31/2020). 6/10/2020 testosterone (stas) is 1304. Repeat test on 6/25/2020 testosterone 1059 (stas) with free at 24.1. He has a history of fatigue, low libido,    Thyroid disease     Tobacco use disorder       Past Surgical History:   Procedure Laterality Date    HX CATARACT REMOVAL      HX HEART VALVE SURGERY      HX HERNIA REPAIR      HX TURP  2015    RI APPENDECTOMY      RI INSJ TUNNELED CVC W/O SUBQ PORT/ AGE 5 YR/> N/A 5/13/2021    insertion tunneled central venous catheter -  perm cath performed by Lucius Richards MD at [facility] CATH LAB    RI INTRO CATH DIALYSIS CIRCUIT DX [address] S&I N/A 5/13/2021    FISTULOGRAM LEFT performed by Lucius Richards MD at [facility] CATH LAB    RI TRANSPLANTATION OF KIDNEY Bilateral 1971,2005    x2    RI TRLUML BALO ANGIOP CTR DIALYSIS SEG W/IMG S&I N/A 5/13/2021    Pta Central Vein Av Graft performed by Lucius Richards MD at [address] [place] [address] CATH LAB     Prior to Admission medications    Medication Sig Start Date End Date Taking?  Authorizing Provider   midodrine (PROAMATINE) 5 mg tablet Take 2 Tabs by mouth every Monday, Wednesday, Friday. 5/14/21   Shital Manuel DO   cefdinir (OMNICEF) 300 mg capsule Take 1 Cap by mouth every other day. 5/14/21   Shital Manuel DO   sevelamer (RENAGEL) 800 mg tablet Take 800 mg by mouth three (3) times daily (with meals). Provider, Historical   ondansetron hcl (Zofran) 4 mg tablet Take 4 mg by mouth every eight (8) hours as needed for Nausea or Vomiting. Provider, Historical   predniSONE (DELTASONE) 5 mg tablet Take 5 mg by mouth daily. Provider, Historical   b complex-vitamin c-folic acid (DIALYVITE) 100-1 mg tab tablet Take 1 Tab by mouth daily. 12/4/18   Provider, Historical   megestrol (MEGACE) 40 mg tablet Take 40 mg by mouth two (2) times a day. Other, MD Babak   timolol (TIMOPTIC) 0.5 % ophthalmic solution Administer 1 Drop to left eye daily. Provider, Historical   pantoprazole (PROTONIX) 40 mg tablet Take 40 mg by mouth daily. Other, MD Babak     Allergies   Allergen Reactions    Levaquin [Levofloxacin] Unknown (comments)      Family History   Problem Relation Age of Onset    Heart Disease Mother     Lung Disease Father     No Known Problems Sister     No Known Problems Brother     No Known Problems Brother       Social History:  reports that he has been smoking cigarettes. He has a 40.00 pack-year smoking history. He has never used smokeless tobacco. He reports previous alcohol use. He reports that he does not use drugs. Family and social history were personally reviewed, all pertinent and relevant details are outlined as above.     Objective:     Visit Vitals  /64   Pulse 65   Temp 98.6 °F (37 °C)   Resp 15   Ht 5' 10\" (1.778 m)   Wt 69.7 kg (153 lb 10.6 oz)   SpO2 92%   BMI 22.05 kg/m²      O2 Device: None (Room air)    PHYSICAL EXAM:   General: Alert x oriented x 3, awake, no acute distress, resting in bed, pleasant male, appears to be stated age   HEENT: PEERL, EOMI, moist mucus membranes  Chest: Clear to auscultation bilaterally   CVS: RRR, S1 S2 heard, no murmurs/rubs/gallops  Abd: Soft, non-tender, non-distended, +bowel sounds   Ext: No clubbing, no cyanosis, no edema  Neuro/Psych: Pleasant mood and affect, CN 2-12 grossly intact, sensory grossly within normal limit, Strength 5/5 in all extremities, DTR 1+ x 4  Cap refill: Brisk, less than 3 seconds  Pulses: 2+, symmetric in all extremities  Skin: Warm, dry, without rashes or lesions    Data Review: All diagnostic labs and studies have been reviewed. Abnormal Labs Reviewed   CBC WITH AUTOMATED DIFF - Abnormal; Notable for the following components:       Result Value    RBC 2.78 (*)     HGB 9.3 (*)     HCT 28.5 (*)     .5 (*)     RDW 16.3 (*)     PLATELET 199 (*)     EOSINOPHILS 11 (*)     BASOPHILS 2 (*)     ABS. NEUTROPHILS 1.6 (*)     ABS. EOSINOPHILS 0.5 (*)     All other components within normal limits   METABOLIC PANEL, COMPREHENSIVE - Abnormal; Notable for the following components:    Sodium 132 (*)     CO2 33 (*)     Anion gap 2 (*)     BUN 24 (*)     Creatinine 3.39 (*)     BUN/Creatinine ratio 7 (*)     GFR est AA 22 (*)     GFR est non-AA 18 (*)     Calcium 8.4 (*)     Alk. phosphatase 130 (*)     Protein, total 5.8 (*)     Albumin 2.6 (*)     A-G Ratio 0.8 (*)     All other components within normal limits       All Micro Results     Procedure Component Value Units Date/Time    CULTURE, BLOOD, PAIRED [[id number]] Collected: 03/24/22 0232    Order Status: Completed Specimen: Blood Updated: 03/24/22 0501     Special Requests: NO SPECIAL REQUESTS        Culture result: NO GROWTH AFTER 2 HOURS             IMAGING:   XR CHEST PORT   Final Result   No acute process.                ECG/ECHO:    Results for orders placed or performed during the hospital encounter of 05/10/21   EKG, 12 LEAD, INITIAL   Result Value Ref Range    Ventricular Rate 116 BPM    Atrial Rate 116 BPM    P-R Interval 216 ms    QRS Duration 142 ms    Q-T Interval 388 ms    QTC Calculation (Bezet) 539 ms    Calculated P Axis 62 degrees    Calculated R Axis -87 degrees    Calculated T Axis 66 degrees    Diagnosis       Sinus tachycardia with 1st degree AV block with occasional premature   ventricular complexes  Left axis deviation  Right bundle branch block  Septal infarct , age undetermined  Abnormal ECG    Confirmed by Ellen Elizabeth M.D., Saray Cavanaugh ([id number]) on 5/11/2021 10:54:55 AM          Assessment:   Given the patient's current clinical presentation, there is a high level of concern for decompensation if discharged from the emergency department. Complex decision making was performed, which includes reviewing the patient's available past medical records, laboratory results, and imaging studies. Active Problems:    Edema of upper extremity (3/24/2022)      Plan:   RUE swelling   Concern for graft malfunction - no thrill   - Vascular surgery consult STAT, discussed with ER   - US of upper extremity graft  - No sign of infection, no WBC, afebrile and no pain or erythema. Hold off antibiotics for now  - Obtain blood cultures   - NPO until vascular surgery evaluates     ESRD on HD  - Sees Dr. Adair Sun   - Nephrology consult   - May need temp dialysis line if unable to use fistula     H/o HTN in past, now with chronic hypotension  - Resume midodrine    Weakness  H/p Cervical spinal surgery in 2021  - [facility]  - PT/OT      DIET: DIET NPO   ISOLATION PRECAUTIONS: There are currently no Active Isolations  CODE STATUS: Full Code   DVT PROPHYLAXIS: Heparin  FUNCTIONAL STATUS PRIOR TO HOSPITALIZATION: Completely disabled. Cannot carry on any self-care. Totally confined to bed or chair. EARLY MOBILITY ASSESSMENT: Recommend an assessment from physical therapy and/or occupational therapy  ANTICIPATED DISCHARGE: 24-48 hours.   EMERGENCY CONTACT/SURROGATE DECISION MAKER: Wife    CRITICAL CARE WAS PERFORMED FOR THIS ENCOUNTER: NO.      Signed By: Natalee Bernard MD     March 24, 2022         Please note that this dictation may have been completed with Dragon, the computer voice recognition software. Quite often unanticipated grammatical, syntax, homophones, and other interpretive errors are inadvertently transcribed by the computer software. Please disregard these errors. Please excuse any errors that have escaped final proofreading.

## 2022-03-24 NOTE — PROGRESS NOTES
CM received message from attending/Dr. Kelsie Valentin, Mr. Chandler Fuentes is stable for discharge from ED. Vascular Surgery/Dr. David Busch is recommending angioplasty/stent placement at some point, possibly next week. CM placed call to KIM/Liaison, message left. CM/DARIEL will attempt to transition back to [employer].     Savage Christian RN, BSN, [facility]  ED Care Management  [phone]

## 2022-03-24 NOTE — ED NOTES
Contacted case management who states she has contacted shelter in [facility] who has accepted the patient back to their facility.

## 2022-03-24 NOTE — PROGRESS NOTES
Vascular:    Patient with chronic right arm swelling related to right upper arm dialysis graft and central vein stenosis. The graft remains patent and is functioning for dialysis. No evidence of infection. He had a angio and balloon angioplasty of the previously stented subclavian vein stenosis 1/2022 at our outpatient vascular center. He needs another angio and further angioplasty and stent placement. From our standpoint he does not need to be hospitalized for this problem. We can set up the procedure next week on a non dialysis day as an outpatient.

## 2022-03-24 NOTE — PROGRESS NOTES
Occupational Therapy  Orders received and chart reviewed. Patient currently in ED. Per CM, pt discharging back to [facility] today. Will follow-up as appropriate/if still here tomorrow.   Zahra Sanchez OTR/L

## 2022-03-24 NOTE — ED TRIAGE NOTES
Presents via EMS from [facility]. Per report from EMS< patient was receiving dialysis tonight when tech noticed his RT arm (where his dialysis fistula is) was swollen significantly. Patient denies pain in arm. During transport, VSS and patient A&Ox4.

## 2022-03-29 LAB
BACTERIA SPEC CULT: NORMAL
SERVICE CMNT-IMP: NORMAL

## 2022-07-07 ENCOUNTER — APPOINTMENT (OUTPATIENT)
Dept: GENERAL RADIOLOGY | Age: 72
DRG: 177 | End: 2022-07-07
Attending: EMERGENCY MEDICINE
Payer: MEDICARE

## 2022-07-07 ENCOUNTER — HOSPITAL ENCOUNTER (INPATIENT)
Age: 72
LOS: 2 days | Discharge: HOSPICE | DRG: 177 | End: 2022-07-09
Attending: EMERGENCY MEDICINE | Admitting: STUDENT IN AN ORGANIZED HEALTH CARE EDUCATION/TRAINING PROGRAM
Payer: MEDICARE

## 2022-07-07 ENCOUNTER — APPOINTMENT (OUTPATIENT)
Dept: CT IMAGING | Age: 72
DRG: 177 | End: 2022-07-07
Attending: STUDENT IN AN ORGANIZED HEALTH CARE EDUCATION/TRAINING PROGRAM
Payer: MEDICARE

## 2022-07-07 DIAGNOSIS — N18.6 END STAGE RENAL DISEASE ON DIALYSIS (HCC): ICD-10-CM

## 2022-07-07 DIAGNOSIS — A41.9 SEVERE SEPSIS (HCC): Primary | ICD-10-CM

## 2022-07-07 DIAGNOSIS — U07.1 COVID-19 VIRUS INFECTION: ICD-10-CM

## 2022-07-07 DIAGNOSIS — R65.20 SEVERE SEPSIS (HCC): Primary | ICD-10-CM

## 2022-07-07 DIAGNOSIS — E16.2 HYPOGLYCEMIA: ICD-10-CM

## 2022-07-07 DIAGNOSIS — R62.51 FAILURE TO THRIVE (CHILD): ICD-10-CM

## 2022-07-07 DIAGNOSIS — Z99.2 END STAGE RENAL DISEASE ON DIALYSIS (HCC): ICD-10-CM

## 2022-07-07 PROBLEM — N18.9 ANEMIA DUE TO CHRONIC KIDNEY DISEASE: Status: ACTIVE | Noted: 2022-07-07

## 2022-07-07 PROBLEM — D63.1 ANEMIA DUE TO CHRONIC KIDNEY DISEASE: Status: ACTIVE | Noted: 2022-07-07

## 2022-07-07 PROBLEM — E43 SEVERE PROTEIN-CALORIE MALNUTRITION (HCC): Status: ACTIVE | Noted: 2022-07-07

## 2022-07-07 PROBLEM — G93.41 ACUTE METABOLIC ENCEPHALOPATHY: Status: ACTIVE | Noted: 2021-05-11

## 2022-07-07 LAB
ALBUMIN SERPL-MCNC: 2.2 G/DL (ref 3.5–5)
ALBUMIN/GLOB SERPL: 0.6 {RATIO} (ref 1.1–2.2)
ALP SERPL-CCNC: 119 U/L (ref 45–117)
ALT SERPL-CCNC: 9 U/L (ref 12–78)
ANION GAP SERPL CALC-SCNC: 12 MMOL/L (ref 5–15)
AST SERPL-CCNC: 21 U/L (ref 15–37)
B PERT DNA SPEC QL NAA+PROBE: NOT DETECTED
BASE EXCESS BLD CALC-SCNC: 9.3 MMOL/L
BASOPHILS # BLD: 0.1 K/UL (ref 0–0.1)
BASOPHILS NFR BLD: 2 % (ref 0–1)
BILIRUB SERPL-MCNC: 0.6 MG/DL (ref 0.2–1)
BORDETELLA PARAPERTUSSIS PCR, BORPAR: NOT DETECTED
BUN SERPL-MCNC: 33 MG/DL (ref 6–20)
BUN/CREAT SERPL: 5 (ref 12–20)
C PNEUM DNA SPEC QL NAA+PROBE: NOT DETECTED
CA-I BLD-MCNC: 0.96 MMOL/L (ref 1.12–1.32)
CALCIUM SERPL-MCNC: 8.9 MG/DL (ref 8.5–10.1)
CHLORIDE BLD-SCNC: 93 MMOL/L (ref 100–108)
CHLORIDE SERPL-SCNC: 92 MMOL/L (ref 97–108)
CO2 BLD-SCNC: 29 MMOL/L (ref 19–24)
CO2 SERPL-SCNC: 30 MMOL/L (ref 21–32)
COMMENT, HOLDF: NORMAL
COVID-19 RAPID TEST, COVR: DETECTED
CREAT SERPL-MCNC: 6.05 MG/DL (ref 0.7–1.3)
CREAT UR-MCNC: 6.5 MG/DL (ref 0.6–1.3)
CRP SERPL-MCNC: 8.78 MG/DL (ref 0–0.6)
D DIMER PPP FEU-MCNC: 2.04 MG/L FEU (ref 0–0.65)
DIFFERENTIAL METHOD BLD: ABNORMAL
EOSINOPHIL # BLD: 0.2 K/UL (ref 0–0.4)
EOSINOPHIL NFR BLD: 6 % (ref 0–7)
ERYTHROCYTE [DISTWIDTH] IN BLOOD BY AUTOMATED COUNT: 16.1 % (ref 11.5–14.5)
ERYTHROCYTE [SEDIMENTATION RATE] IN BLOOD: 64 MM/HR (ref 0–20)
FLUAV AG NPH QL IA: NEGATIVE
FLUAV H1 2009 PAND RNA SPEC QL NAA+PROBE: NOT DETECTED
FLUAV H1 RNA SPEC QL NAA+PROBE: NOT DETECTED
FLUAV H3 RNA SPEC QL NAA+PROBE: NOT DETECTED
FLUAV SUBTYP SPEC NAA+PROBE: NOT DETECTED
FLUBV AG NOSE QL IA: NEGATIVE
FLUBV RNA SPEC QL NAA+PROBE: NOT DETECTED
GLOBULIN SER CALC-MCNC: 3.8 G/DL (ref 2–4)
GLUCOSE BLD STRIP.AUTO-MCNC: 235 MG/DL (ref 65–117)
GLUCOSE BLD STRIP.AUTO-MCNC: 63 MG/DL (ref 74–106)
GLUCOSE SERPL-MCNC: 74 MG/DL (ref 65–100)
HADV DNA SPEC QL NAA+PROBE: NOT DETECTED
HAPTOGLOB SERPL-MCNC: 77 MG/DL (ref 30–200)
HCO3 BLDA-SCNC: 29 MMOL/L
HCOV 229E RNA SPEC QL NAA+PROBE: NOT DETECTED
HCOV HKU1 RNA SPEC QL NAA+PROBE: NOT DETECTED
HCOV NL63 RNA SPEC QL NAA+PROBE: NOT DETECTED
HCOV OC43 RNA SPEC QL NAA+PROBE: NOT DETECTED
HCT VFR BLD AUTO: 30.3 % (ref 36.6–50.3)
HGB BLD-MCNC: 10.4 G/DL (ref 12.1–17)
HMPV RNA SPEC QL NAA+PROBE: NOT DETECTED
HPIV1 RNA SPEC QL NAA+PROBE: NOT DETECTED
HPIV2 RNA SPEC QL NAA+PROBE: NOT DETECTED
HPIV3 RNA SPEC QL NAA+PROBE: NOT DETECTED
HPIV4 RNA SPEC QL NAA+PROBE: NOT DETECTED
IMM GRANULOCYTES # BLD AUTO: 0 K/UL (ref 0–0.04)
IMM GRANULOCYTES NFR BLD AUTO: 0 % (ref 0–0.5)
LACTATE BLD-SCNC: 1.48 MMOL/L (ref 0.4–2)
LDH SERPL L TO P-CCNC: 160 U/L (ref 85–241)
LYMPHOCYTES # BLD: 1.9 K/UL (ref 0.8–3.5)
LYMPHOCYTES NFR BLD: 56 % (ref 12–49)
M PNEUMO DNA SPEC QL NAA+PROBE: NOT DETECTED
MAGNESIUM SERPL-MCNC: 2.2 MG/DL (ref 1.6–2.4)
MCH RBC QN AUTO: 32.5 PG (ref 26–34)
MCHC RBC AUTO-ENTMCNC: 34.3 G/DL (ref 30–36.5)
MCV RBC AUTO: 94.7 FL (ref 80–99)
MONOCYTES # BLD: 0.2 K/UL (ref 0–1)
MONOCYTES NFR BLD: 5 % (ref 5–13)
NEUTS SEG # BLD: 1 K/UL (ref 1.8–8)
NEUTS SEG NFR BLD: 30 % (ref 32–75)
NRBC # BLD: 0 K/UL (ref 0–0.01)
NRBC BLD-RTO: 0 PER 100 WBC
PCO2 BLDV: 25.8 MMHG (ref 41–51)
PH BLDV: 7.66 [PH] (ref 7.32–7.42)
PHOSPHATE SERPL-MCNC: 2.4 MG/DL (ref 2.6–4.7)
PLATELET # BLD AUTO: 139 K/UL (ref 150–400)
PMV BLD AUTO: 11.1 FL (ref 8.9–12.9)
PO2 BLDV: 56 MMHG (ref 25–40)
POTASSIUM BLD-SCNC: 5.3 MMOL/L (ref 3.5–5.5)
POTASSIUM SERPL-SCNC: 5 MMOL/L (ref 3.5–5.1)
PROCALCITONIN SERPL-MCNC: 0.6 NG/ML
PROCALCITONIN SERPL-MCNC: 0.61 NG/ML
PROT SERPL-MCNC: 6 G/DL (ref 6.4–8.2)
RBC # BLD AUTO: 3.2 M/UL (ref 4.1–5.7)
RSV RNA SPEC QL NAA+PROBE: NOT DETECTED
RV+EV RNA SPEC QL NAA+PROBE: NOT DETECTED
SAMPLES BEING HELD,HOLD: NORMAL
SARS-COV-2 PCR, COVPCR: DETECTED
SERVICE CMNT-IMP: ABNORMAL
SODIUM BLD-SCNC: 132 MMOL/L (ref 136–145)
SODIUM SERPL-SCNC: 134 MMOL/L (ref 136–145)
SOURCE, COVRS: ABNORMAL
SPECIMEN SITE: ABNORMAL
TROPONIN-HIGH SENSITIVITY: 11 NG/L (ref 0–76)
WBC # BLD AUTO: 3.3 K/UL (ref 4.1–11.1)

## 2022-07-07 PROCEDURE — 74011000258 HC RX REV CODE- 258: Performed by: STUDENT IN AN ORGANIZED HEALTH CARE EDUCATION/TRAINING PROGRAM

## 2022-07-07 PROCEDURE — 83735 ASSAY OF MAGNESIUM: CPT

## 2022-07-07 PROCEDURE — 80053 COMPREHEN METABOLIC PANEL: CPT

## 2022-07-07 PROCEDURE — 85025 COMPLETE CBC W/AUTO DIFF WBC: CPT

## 2022-07-07 PROCEDURE — 5A1D70Z PERFORMANCE OF URINARY FILTRATION, INTERMITTENT, LESS THAN 6 HOURS PER DAY: ICD-10-PCS | Performed by: INTERNAL MEDICINE

## 2022-07-07 PROCEDURE — 74011000636 HC RX REV CODE- 636: Performed by: RADIOLOGY

## 2022-07-07 PROCEDURE — 82947 ASSAY GLUCOSE BLOOD QUANT: CPT

## 2022-07-07 PROCEDURE — 84145 PROCALCITONIN (PCT): CPT

## 2022-07-07 PROCEDURE — 71260 CT THORAX DX C+: CPT

## 2022-07-07 PROCEDURE — 85379 FIBRIN DEGRADATION QUANT: CPT

## 2022-07-07 PROCEDURE — 84100 ASSAY OF PHOSPHORUS: CPT

## 2022-07-07 PROCEDURE — 93005 ELECTROCARDIOGRAM TRACING: CPT

## 2022-07-07 PROCEDURE — 74011250637 HC RX REV CODE- 250/637: Performed by: EMERGENCY MEDICINE

## 2022-07-07 PROCEDURE — 74011000250 HC RX REV CODE- 250

## 2022-07-07 PROCEDURE — 74011250637 HC RX REV CODE- 250/637: Performed by: STUDENT IN AN ORGANIZED HEALTH CARE EDUCATION/TRAINING PROGRAM

## 2022-07-07 PROCEDURE — 96367 TX/PROPH/DG ADDL SEQ IV INF: CPT

## 2022-07-07 PROCEDURE — 99285 EMERGENCY DEPT VISIT HI MDM: CPT

## 2022-07-07 PROCEDURE — 83615 LACTATE (LD) (LDH) ENZYME: CPT

## 2022-07-07 PROCEDURE — 65270000046 HC RM TELEMETRY

## 2022-07-07 PROCEDURE — 74011250636 HC RX REV CODE- 250/636: Performed by: EMERGENCY MEDICINE

## 2022-07-07 PROCEDURE — 86140 C-REACTIVE PROTEIN: CPT

## 2022-07-07 PROCEDURE — 84484 ASSAY OF TROPONIN QUANT: CPT

## 2022-07-07 PROCEDURE — 36415 COLL VENOUS BLD VENIPUNCTURE: CPT

## 2022-07-07 PROCEDURE — 82962 GLUCOSE BLOOD TEST: CPT

## 2022-07-07 PROCEDURE — 74011250636 HC RX REV CODE- 250/636: Performed by: STUDENT IN AN ORGANIZED HEALTH CARE EDUCATION/TRAINING PROGRAM

## 2022-07-07 PROCEDURE — 96365 THER/PROPH/DIAG IV INF INIT: CPT

## 2022-07-07 PROCEDURE — 74011000250 HC RX REV CODE- 250: Performed by: STUDENT IN AN ORGANIZED HEALTH CARE EDUCATION/TRAINING PROGRAM

## 2022-07-07 PROCEDURE — 83529 ASAY OF INTERLEUKIN-6 (IL-6): CPT

## 2022-07-07 PROCEDURE — 94761 N-INVAS EAR/PLS OXIMETRY MLT: CPT

## 2022-07-07 PROCEDURE — 90935 HEMODIALYSIS ONE EVALUATION: CPT

## 2022-07-07 PROCEDURE — 87040 BLOOD CULTURE FOR BACTERIA: CPT

## 2022-07-07 PROCEDURE — 87804 INFLUENZA ASSAY W/OPTIC: CPT

## 2022-07-07 PROCEDURE — 96366 THER/PROPH/DIAG IV INF ADDON: CPT

## 2022-07-07 PROCEDURE — 0202U NFCT DS 22 TRGT SARS-COV-2: CPT

## 2022-07-07 PROCEDURE — 74011000258 HC RX REV CODE- 258: Performed by: EMERGENCY MEDICINE

## 2022-07-07 PROCEDURE — 87635 SARS-COV-2 COVID-19 AMP PRB: CPT

## 2022-07-07 PROCEDURE — 85652 RBC SED RATE AUTOMATED: CPT

## 2022-07-07 PROCEDURE — 71045 X-RAY EXAM CHEST 1 VIEW: CPT

## 2022-07-07 PROCEDURE — 83010 ASSAY OF HAPTOGLOBIN QUANT: CPT

## 2022-07-07 RX ORDER — LORAZEPAM 1 MG/1
1 TABLET ORAL 2 TIMES DAILY
COMMUNITY

## 2022-07-07 RX ORDER — ONDANSETRON 4 MG/1
4 TABLET, ORALLY DISINTEGRATING ORAL
Status: DISCONTINUED | OUTPATIENT
Start: 2022-07-07 | End: 2022-07-09 | Stop reason: HOSPADM

## 2022-07-07 RX ORDER — VANCOMYCIN/0.9 % SOD CHLORIDE 1.5G/250ML
1500 PLASTIC BAG, INJECTION (ML) INTRAVENOUS ONCE
Status: COMPLETED | OUTPATIENT
Start: 2022-07-07 | End: 2022-07-07

## 2022-07-07 RX ORDER — FAMOTIDINE 20 MG/1
20 TABLET, FILM COATED ORAL EVERY 24 HOURS
Status: DISCONTINUED | OUTPATIENT
Start: 2022-07-07 | End: 2022-07-09 | Stop reason: HOSPADM

## 2022-07-07 RX ORDER — OXYCODONE HYDROCHLORIDE 5 MG/1
5 TABLET ORAL 2 TIMES DAILY
Status: DISCONTINUED | OUTPATIENT
Start: 2022-07-07 | End: 2022-07-09 | Stop reason: HOSPADM

## 2022-07-07 RX ORDER — MIDODRINE HYDROCHLORIDE 5 MG/1
10 TABLET ORAL
Status: DISCONTINUED | OUTPATIENT
Start: 2022-07-08 | End: 2022-07-09 | Stop reason: HOSPADM

## 2022-07-07 RX ORDER — SODIUM CHLORIDE 0.9 % (FLUSH) 0.9 %
5-40 SYRINGE (ML) INJECTION EVERY 8 HOURS
Status: DISCONTINUED | OUTPATIENT
Start: 2022-07-07 | End: 2022-07-09 | Stop reason: HOSPADM

## 2022-07-07 RX ORDER — OXYCODONE HYDROCHLORIDE 5 MG/1
5 TABLET ORAL 2 TIMES DAILY
COMMUNITY

## 2022-07-07 RX ORDER — ERGOCALCIFEROL 1.25 MG/1
50000 CAPSULE ORAL ONCE
Status: DISPENSED | OUTPATIENT
Start: 2022-07-07 | End: 2022-07-07

## 2022-07-07 RX ORDER — ASCORBIC ACID 500 MG
500 TABLET ORAL EVERY 6 HOURS
Status: DISCONTINUED | OUTPATIENT
Start: 2022-07-07 | End: 2022-07-09 | Stop reason: HOSPADM

## 2022-07-07 RX ORDER — LORAZEPAM 1 MG/1
1 TABLET ORAL 2 TIMES DAILY
Status: DISCONTINUED | OUTPATIENT
Start: 2022-07-07 | End: 2022-07-09 | Stop reason: HOSPADM

## 2022-07-07 RX ORDER — ACETAMINOPHEN 500 MG
1000 TABLET ORAL
Status: DISCONTINUED | OUTPATIENT
Start: 2022-07-07 | End: 2022-07-07

## 2022-07-07 RX ORDER — POLYETHYLENE GLYCOL 3350 17 G/17G
17 POWDER, FOR SOLUTION ORAL DAILY PRN
Status: DISCONTINUED | OUTPATIENT
Start: 2022-07-07 | End: 2022-07-09 | Stop reason: HOSPADM

## 2022-07-07 RX ORDER — LORAZEPAM 2 MG/ML
1 INJECTION, SOLUTION INTRAMUSCULAR; INTRAVENOUS
Status: COMPLETED | OUTPATIENT
Start: 2022-07-07 | End: 2022-07-07

## 2022-07-07 RX ORDER — DEXTROSE MONOHYDRATE 100 MG/ML
INJECTION, SOLUTION INTRAVENOUS
Status: COMPLETED
Start: 2022-07-07 | End: 2022-07-07

## 2022-07-07 RX ORDER — ZINC SULFATE 50(220)MG
1 CAPSULE ORAL DAILY
Status: DISCONTINUED | OUTPATIENT
Start: 2022-07-07 | End: 2022-07-09 | Stop reason: HOSPADM

## 2022-07-07 RX ORDER — SODIUM CHLORIDE 0.9 % (FLUSH) 0.9 %
5-40 SYRINGE (ML) INJECTION AS NEEDED
Status: DISCONTINUED | OUTPATIENT
Start: 2022-07-07 | End: 2022-07-09 | Stop reason: HOSPADM

## 2022-07-07 RX ORDER — ACETAMINOPHEN 650 MG/1
650 SUPPOSITORY RECTAL
Status: COMPLETED | OUTPATIENT
Start: 2022-07-07 | End: 2022-07-07

## 2022-07-07 RX ORDER — SODIUM CHLORIDE 9 MG/ML
75 INJECTION, SOLUTION INTRAVENOUS CONTINUOUS
Status: DISCONTINUED | OUTPATIENT
Start: 2022-07-07 | End: 2022-07-08

## 2022-07-07 RX ORDER — ACETAMINOPHEN 325 MG/1
650 TABLET ORAL
Status: DISCONTINUED | OUTPATIENT
Start: 2022-07-07 | End: 2022-07-09 | Stop reason: HOSPADM

## 2022-07-07 RX ORDER — TIMOLOL MALEATE 5 MG/ML
1 SOLUTION/ DROPS OPHTHALMIC DAILY
Status: DISCONTINUED | OUTPATIENT
Start: 2022-07-07 | End: 2022-07-09 | Stop reason: HOSPADM

## 2022-07-07 RX ORDER — HEPARIN SODIUM 5000 [USP'U]/ML
5000 INJECTION, SOLUTION INTRAVENOUS; SUBCUTANEOUS EVERY 8 HOURS
Status: DISCONTINUED | OUTPATIENT
Start: 2022-07-07 | End: 2022-07-09 | Stop reason: HOSPADM

## 2022-07-07 RX ORDER — ACETAMINOPHEN 650 MG/1
650 SUPPOSITORY RECTAL
Status: DISCONTINUED | OUTPATIENT
Start: 2022-07-07 | End: 2022-07-09 | Stop reason: HOSPADM

## 2022-07-07 RX ORDER — SEVELAMER CARBONATE 800 MG/1
800 TABLET, FILM COATED ORAL
Status: DISCONTINUED | OUTPATIENT
Start: 2022-07-07 | End: 2022-07-09 | Stop reason: HOSPADM

## 2022-07-07 RX ORDER — ONDANSETRON 2 MG/ML
4 INJECTION INTRAMUSCULAR; INTRAVENOUS
Status: DISCONTINUED | OUTPATIENT
Start: 2022-07-07 | End: 2022-07-09 | Stop reason: HOSPADM

## 2022-07-07 RX ORDER — PANTOPRAZOLE SODIUM 40 MG/1
40 TABLET, DELAYED RELEASE ORAL DAILY
Status: DISCONTINUED | OUTPATIENT
Start: 2022-07-07 | End: 2022-07-09 | Stop reason: HOSPADM

## 2022-07-07 RX ADMIN — SODIUM CHLORIDE 75 ML/HR: 9 INJECTION, SOLUTION INTRAVENOUS at 06:10

## 2022-07-07 RX ADMIN — SODIUM CHLORIDE, PRESERVATIVE FREE 10 ML: 5 INJECTION INTRAVENOUS at 22:59

## 2022-07-07 RX ADMIN — ACETAMINOPHEN 650 MG: 650 SUPPOSITORY RECTAL at 02:16

## 2022-07-07 RX ADMIN — OXYCODONE 5 MG: 5 TABLET ORAL at 08:53

## 2022-07-07 RX ADMIN — TIMOLOL MALEATE 1 DROP: 5 SOLUTION OPHTHALMIC at 11:17

## 2022-07-07 RX ADMIN — CEFEPIME HYDROCHLORIDE 1 G: 1 INJECTION, POWDER, FOR SOLUTION INTRAMUSCULAR; INTRAVENOUS at 03:32

## 2022-07-07 RX ADMIN — ZINC SULFATE 220 MG (50 MG) CAPSULE 1 CAPSULE: CAPSULE at 08:53

## 2022-07-07 RX ADMIN — DOXYCYCLINE 100 MG: 100 INJECTION, POWDER, LYOPHILIZED, FOR SOLUTION INTRAVENOUS at 09:24

## 2022-07-07 RX ADMIN — FAMOTIDINE 20 MG: 20 TABLET, FILM COATED ORAL at 22:52

## 2022-07-07 RX ADMIN — LORAZEPAM 1 MG: 1 TABLET ORAL at 17:53

## 2022-07-07 RX ADMIN — SODIUM CHLORIDE 1500 MG: 900 INJECTION, SOLUTION INTRAVENOUS at 04:09

## 2022-07-07 RX ADMIN — DEXTROSE MONOHYDRATE 250 ML: 100 INJECTION, SOLUTION INTRAVENOUS at 02:17

## 2022-07-07 RX ADMIN — OXYCODONE 5 MG: 5 TABLET ORAL at 17:53

## 2022-07-07 RX ADMIN — SODIUM CHLORIDE 1000 ML: 9 INJECTION, SOLUTION INTRAVENOUS at 02:00

## 2022-07-07 RX ADMIN — SEVELAMER CARBONATE 800 MG: 800 TABLET, FILM COATED ORAL at 16:34

## 2022-07-07 RX ADMIN — SODIUM CHLORIDE, PRESERVATIVE FREE 10 ML: 5 INJECTION INTRAVENOUS at 06:25

## 2022-07-07 RX ADMIN — OXYCODONE HYDROCHLORIDE AND ACETAMINOPHEN 500 MG: 500 TABLET ORAL at 17:53

## 2022-07-07 RX ADMIN — LORAZEPAM 1 MG: 1 TABLET ORAL at 08:53

## 2022-07-07 RX ADMIN — OXYCODONE HYDROCHLORIDE AND ACETAMINOPHEN 500 MG: 500 TABLET ORAL at 08:54

## 2022-07-07 RX ADMIN — DOXYCYCLINE 100 MG: 100 INJECTION, POWDER, LYOPHILIZED, FOR SOLUTION INTRAVENOUS at 21:12

## 2022-07-07 RX ADMIN — SEVELAMER CARBONATE 800 MG: 800 TABLET, FILM COATED ORAL at 09:23

## 2022-07-07 RX ADMIN — CEFEPIME HYDROCHLORIDE 1 G: 1 INJECTION, POWDER, FOR SOLUTION INTRAMUSCULAR; INTRAVENOUS at 22:53

## 2022-07-07 RX ADMIN — HEPARIN SODIUM 5000 UNITS: 5000 INJECTION INTRAVENOUS; SUBCUTANEOUS at 22:52

## 2022-07-07 RX ADMIN — HEPARIN SODIUM 5000 UNITS: 5000 INJECTION INTRAVENOUS; SUBCUTANEOUS at 16:33

## 2022-07-07 RX ADMIN — NEPHROCAP 1 CAPSULE: 1 CAP ORAL at 09:23

## 2022-07-07 RX ADMIN — PANTOPRAZOLE SODIUM 40 MG: 40 TABLET, DELAYED RELEASE ORAL at 08:53

## 2022-07-07 RX ADMIN — HEPARIN SODIUM 5000 UNITS: 5000 INJECTION INTRAVENOUS; SUBCUTANEOUS at 06:06

## 2022-07-07 RX ADMIN — SODIUM CHLORIDE, PRESERVATIVE FREE 10 ML: 5 INJECTION INTRAVENOUS at 14:00

## 2022-07-07 RX ADMIN — IOPAMIDOL 100 ML: 755 INJECTION, SOLUTION INTRAVENOUS at 06:42

## 2022-07-07 RX ADMIN — LORAZEPAM 1 MG: 2 INJECTION, SOLUTION INTRAMUSCULAR; INTRAVENOUS at 06:06

## 2022-07-07 NOTE — PROGRESS NOTES
Hemodialysis / [phone]    Vitals Pre Post Assessment Pre Post   BP BP: (!) 94/59 (07/07/22 1235)  LOC AOX1, restless, unable to fully follow commands    HR Pulse (Heart Rate): 97 (07/07/22 1235)  Lungs Room air 99%  Clear/diminished    Resp Resp Rate: 25 (07/07/22 1235)  Cardiac Distant heart tones    Temp Temp: 96.9 °F (36.1 °C) (07/07/22 1235)  Skin Warm;dry    Weight  utd / ER stretcher  Edema R arm 4+ edema, present on arrival, conveyed to MD and well documented in notes. Tele status Bedside  Pain Pain Intensity 1: 0 (07/07/22 0743)      Orders   Duration: Start: 2787 End:  Total:    Dialyzer: Dialyzer/Set Up Inspection: [place] (07/07/22 1235)   K Bath: Dialysate K (mEq/L): 2 (07/07/22 1235)   Ca Bath: Dialysate CA (mEq/L): 2.5 (07/07/22 1235)   Na: Dialysate NA (mEq/L): 138 (07/07/22 1235)   Bicarb: Dialysate HCO3 (mEq/L): 35 (07/07/22 1235)   Target Fluid Removal: Goal/Amount of Fluid to Remove (mL): 0 mL (07/07/22 1235)     Access   Type & Location: SAI AVG, Extreme edema. Cannulated with two 15G needles; however, quite a challenging cannulation. Information conveyed to Dr. Aayush Vivar.     Comments:                                        Labs   HBsAg (Antigen) / date:     Negative 11/22/21                                          HBsAb (Antibody) / date:     Immune 12/13/21   Source:     Physician portal; also ordered repeat labs   Obtained/Reviewed  Critical Results Called HGB   Date Value Ref Range Status   07/07/2022 10.4 (L) 12.1 - 17.0 g/dL Final     Potassium   Date Value Ref Range Status   07/07/2022 5.0 3.5 - 5.1 mmol/L Final     Calcium   Date Value Ref Range Status   07/07/2022 8.9 8.5 - 10.1 MG/DL Final     BUN   Date Value Ref Range Status   07/07/2022 33 (H) 6 - 20 MG/DL Final     Creatinine   Date Value Ref Range Status   07/07/2022 6.05 (H) 0.70 - 1.30 MG/DL Final        Meds Given   Name Dose Route                    Adequacy / Fluid    Total Liters Process:    Net Fluid Removed: Comments   Time Out Done:   (Time) 1145   Admitting Diagnosis: AMS, COVID+   Consent obtained/signed: Informed Consent Verified:  (chronic patient) (07/07/22 1235)   Machine / RO # Machine Number: [id number] (07/07/22 0861)   Primary Nurse Rpt Pre: Asiya Garcias RN   Primary Nurse Rpt Post:    Pt Education: Procedural   Care Plan: Continue current HD plan of care   Pts outpatient clinic: [facility]     Tx Summary   Comments:      Pt orders, notes, labs and code status reviewed. Pt in COVID+, all appropriate ppe utilized during the care/management of this patient. See above notes re: R arm edema. HD start time: 1235  1330: Update provided to Dr. Priscila Sanches re: SAI swelling; difficulty cannulating as a result. Vascular Consult placed per telephone order for Dr. Robert Beck, Vascular Surgery. 1350: Report given to Eli Plummer who will complete this treatment.

## 2022-07-07 NOTE — PROGRESS NOTES
Vascular:    Patient has chronic right arm swelling due to right upper arm AV dialysis graft and known subclavian vein stenosis which has been previously stented and subsequently treated on several occasions with balloon angioplasty for recurrent in - stent stenosis as recently as 3/2022. His graft remains patent but access is difficult due to swelling. The recent interventions do not provide much improvement in swelling and do not last very long. Would continue to use graft at this point. Will consider angio and further angioplasty depending on clinical course. Call if questions.

## 2022-07-07 NOTE — H&P
History & Physical    Primary Care Provider: Yoli Adams NP  Source of Information: Patient and chart review    History of Presenting Illness:   Fritz Nunes is a 73 y.o. male with history of ESRD on HD, orthostasis, hypertension, paroxysmal atrial fibrillation, BPH status post TURP, chronic anemia, GERD, inflammatory bowel disease, currently on home hospice but caretaker who presented to ER via EMS due to caregiver concerns for altered mental status. History from patient is limited. Per chart review, patient's caregiver states patient has had increasing weakness, debility and disorientation over the last week. Has also had decreased p.o. intake. Patient is mildly confused at time of my ER evaluation. Caretaker further states that patient missed his last hemodialysis session due to the above concerns. Additionally, patient is on home hospice. It is unclear what agency provides his hospice care. He was currently brought to ER despite being on hospice care. The patient complains of bilateral lower extremity pain. Denies any chest pain or shortness of breath. Remarkable vitals on ER Presentations: t-102.7, hr to 114  Labs Remarkable for: wbc 3.3, cr 6.05, alb 2.2, procal 0.61,  ER Images: No acute process on limited portable chest view. ER Rx: vanc + cefepime + 1 NS Bolus. Review of Systems:  Pertinent items are noted in the History of Present Illness. 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 12/19/19 hGb is 12.4 with HcT 36.5 His chronic anemia could be contributory to his fatigue. H/H ok on Cassandra 10, 2020 labs.     Chronic GERD     Chronic kidney disease     Erectile dysfunction     Gout     Hypertension     Ill-defined condition     dialysis access to left arm    Inflammatory bowel disease     Meningitis     Renal transplant recipient     S/P TURP 2015    Stage 5 chronic kidney disease on chronic dialysis ([facility] [address] 7/1/2020    He has failed bilateral renal transplants, and is dialysis dependent at this time. He is followed by Dr. Armida Hernandez, nephrology. His creatinine on 10/5/18 was 6.37 His creatinine on 12/19/19 was 6.65. Creatinine on 3/31/2020 was 6.41. Creatinineo on 6/10/2020 was 3.84    Testicular hypofunction 7/1/2020    He was last seen by a provider on 5/28/2020 via virtual visit. His testosterone level was found to be 79 on 3/31/2020. His levels have not improved on 200 mg IM q 2 weeks. He still notes fatigue and ED, though somewhat improved. LFT ok (3/31/2020). 6/10/2020 testosterone (stas) is 1304. Repeat test on 6/25/2020 testosterone 1059 (stas) with free at 24.1. He has a history of fatigue, low libido,    Thyroid disease     Tobacco use disorder       Past Surgical History:   Procedure Laterality Date    HX CATARACT REMOVAL      HX HEART VALVE SURGERY      HX HERNIA REPAIR      HX TURP  2015    WY APPENDECTOMY      WY INSJ TUNNELED CVC W/O SUBQ PORT/ AGE 5 YR/> N/A 5/13/2021    insertion tunneled central venous catheter -  perm cath performed by Jr Minaya MD at [facility] CATH [facility]    WY INTRO CATH DIALYSIS CIRCUIT DX [facility] S&I N/A 5/13/2021    FISTULOGRAM LEFT performed by Jr Minaya MD at [facility] CATH LAB    [place] TRANSPLANTATION OF KIDNEY Bilateral 1971,2005    x2    WY TRLUML BALO ANGIOP CTR DIALYSIS SEG W/IMG S&I N/A 5/13/2021    Pta Central Vein Av Graft performed by Jr Minaya MD at [facility] CATH LAB     Prior to Admission medications    Medication Sig Start Date End Date Taking? Authorizing Provider   midodrine (PROAMATINE) 5 mg tablet Take 2 Tabs by mouth every Monday, Wednesday, Friday. 5/14/21   Blinda Ivan, DO   sevelamer (RENAGEL) 800 mg tablet Take 800 mg by mouth three (3) times daily (with meals). Provider, Historical   b complex-vitamin c-folic acid (DIALYVITE) 100-1 mg tab tablet Take 1 Tab by mouth daily. 12/4/18   Provider, Historical   timolol (TIMOPTIC) 0.5 % ophthalmic solution Administer 1 Drop to left eye daily. Provider, Historical   pantoprazole (PROTONIX) 40 mg tablet Take 40 mg by mouth daily. Other, MD Babak     Allergies   Allergen Reactions    Levaquin [Levofloxacin] Unknown (comments)      Family History   Problem Relation Age of Onset    Heart Disease Mother     Lung Disease Father     No Known Problems Sister     No Known Problems Brother     No Known Problems Brother         SOCIAL HISTORY:  Patient resides:  Independently x   Assisted Living    SNF    With family care       Smoking history:   None x   Former    Chronic      Alcohol history:   None x   Social    Chronic      Ambulates:   Independently x   w/cane    w/walker    w/wc    CODE STATUS:  DNR    Full x   Other      Objective:     Physical Exam:     Visit Vitals  BP (!) 124/55 (BP 1 Location: Left leg, BP Patient Position: At rest)   Pulse (!) 114   Temp 100.3 °F (37.9 °C)   Resp 20   Ht 5' 10\" (1.778 m)   Wt 65.9 kg (145 lb 4.5 oz)   SpO2 97%   BMI 20.85 kg/m²      O2 Device: None (Room air)    General:  Alert, cooperative, no distress, appears stated age. Head:  Normocephalic, without obvious abnormality, atraumatic. Eyes:  Conjunctivae/corneas clear. PERRL, EOMs intact. Nose: Nares normal. Septum midline. Mucosa normal.        Neck: Supple, symmetrical, trachea midline, no carotid bruit and no JVD. Lungs:    Coarse breath sounds bilaterally with decreased bibasilar air entry. Chest wall:  No tenderness or deformity. Heart:   Tachycardia, S1, S2 normal   Abdomen:   Soft, non-tender. Bowel sounds normal. No masses,  No organomegaly. Extremities: Extremities normal, atraumatic, no cyanosis or edema. Pulses: 2+ and symmetric all extremities.    Skin: Skin color, texture, turgor normal. No rashes or lesions   Neurologic: CNII-XII intact. EKG:  Prolonged qtc. Junctional rhythm    Data Review:     Recent Days:  Recent Labs     07/07/22  0152   WBC 3.3*   HGB 10.4*   HCT 30.3*   *     Recent Labs     07/07/22  0152   *   K 5.0   CL 92*   CO2 30   GLU 74   BUN 33*   CREA 6.05*   CA 8.9   MG 2.2   PHOS 2.4*   ALB 2.2*   ALT 9*     Recent Labs     07/07/22  0203   HCO3 29       24 Hour Results:  Recent Results (from the past 24 hour(s))   CBC WITH AUTOMATED DIFF    Collection Time: 07/07/22  1:52 AM   Result Value Ref Range    WBC 3.3 (L) 4.1 - 11.1 K/uL    RBC 3.20 (L) 4.10 - 5.70 M/uL    HGB 10.4 (L) 12.1 - 17.0 g/dL    HCT 30.3 (L) 36.6 - 50.3 %    MCV 94.7 80.0 - 99.0 FL    MCH 32.5 26.0 - 34.0 PG    MCHC 34.3 30.0 - 36.5 g/dL    RDW 16.1 (H) 11.5 - 14.5 %    PLATELET 689 (L) 629 - 400 K/uL    MPV 11.1 8.9 - 12.9 FL    NRBC 0.0 0  WBC    ABSOLUTE NRBC 0.00 0.00 - 0.01 K/uL    NEUTROPHILS 30 (L) 32 - 75 %    LYMPHOCYTES 56 (H) 12 - 49 %    MONOCYTES 5 5 - 13 %    EOSINOPHILS 6 0 - 7 %    BASOPHILS 2 (H) 0 - 1 %    IMMATURE GRANULOCYTES 0 0.0 - 0.5 %    ABS. NEUTROPHILS 1.0 (L) 1.8 - 8.0 K/UL    ABS. LYMPHOCYTES 1.9 0.8 - 3.5 K/UL    ABS. MONOCYTES 0.2 0.0 - 1.0 K/UL    ABS. EOSINOPHILS 0.2 0.0 - 0.4 K/UL    ABS. BASOPHILS 0.1 0.0 - 0.1 K/UL    ABS. IMM.  GRANS. 0.0 0.00 - 0.04 K/UL    DF AUTOMATED     METABOLIC PANEL, COMPREHENSIVE    Collection Time: 07/07/22  1:52 AM   Result Value Ref Range    Sodium 134 (L) 136 - 145 mmol/L    Potassium 5.0 3.5 - 5.1 mmol/L    Chloride 92 (L) 97 - 108 mmol/L    CO2 30 21 - 32 mmol/L    Anion gap 12 5 - 15 mmol/L    Glucose 74 65 - 100 mg/dL    BUN 33 (H) 6 - 20 MG/DL    Creatinine 6.05 (H) 0.70 - 1.30 MG/DL    BUN/Creatinine ratio 5 (L) 12 - 20      GFR est AA 11 (L) >60 ml/min/1.73m2    GFR est non-AA 9 (L) >60 ml/min/1.73m2    Calcium 8.9 8.5 - 10.1 MG/DL    Bilirubin, total 0.6 0.2 - 1.0 MG/DL    ALT (SGPT) 9 (L) 12 - 78 U/L    AST (SGOT) 21 15 - 37 U/L    Alk. phosphatase 119 (H) 45 - 117 U/L    Protein, total 6.0 (L) 6.4 - 8.2 g/dL    Albumin 2.2 (L) 3.5 - 5.0 g/dL    Globulin 3.8 2.0 - 4.0 g/dL    A-G Ratio 0.6 (L) 1.1 - 2.2     C REACTIVE PROTEIN, QT    Collection Time: 07/07/22  1:52 AM   Result Value Ref Range    C-Reactive protein 8.78 (H) 0.00 - 0.60 mg/dL   MAGNESIUM    Collection Time: 07/07/22  1:52 AM   Result Value Ref Range    Magnesium 2.2 1.6 - 2.4 mg/dL   PHOSPHORUS    Collection Time: 07/07/22  1:52 AM   Result Value Ref Range    Phosphorus 2.4 (L) 2.6 - 4.7 MG/DL   PROCALCITONIN    Collection Time: 07/07/22  1:52 AM   Result Value Ref Range    Procalcitonin 0.61 ng/mL   SAMPLES BEING HELD    Collection Time: 07/07/22  1:52 AM   Result Value Ref Range    SAMPLES BEING HELD 1blu,1sst,[id number]     COMMENT        Add-on orders for these samples will be processed based on acceptable specimen integrity and analyte stability, which may vary by analyte.    INFLUENZA A+B VIRAL AGS    Collection Time: 07/07/22  1:54 AM   Result Value Ref Range    Influenza A Antigen Negative NEG      Influenza B Antigen Negative NEG     COVID-19 RAPID TEST    Collection Time: 07/07/22  1:54 AM   Result Value Ref Range    Specimen source Nasopharyngeal      COVID-19 rapid test Detected (A) NOTD     EKG, 12 LEAD, INITIAL    Collection Time: 07/07/22  2:02 AM   Result Value Ref Range    Ventricular Rate 115 BPM    QRS Duration 106 ms    Q-T Interval 406 ms    QTC Calculation (Bezet) 561 ms    Calculated R Axis -76 degrees    Calculated T Axis 97 degrees    Diagnosis        Critical Test Result: Long QTc  Accelerated Junctional rhythm with retrograde conduction  Left anterior fascicular block  Anteroseptal infarct (cited on or before 10-MAY-2021)  Prolonged QT  Abnormal ECG  When compared with ECG of 10-MAY-2021 22:52,  Junctional rhythm has replaced Sinus rhythm  Right bundle branch block is no longer present  Questionable change in initial forces of Septal leads BLOOD GAS,CHEM8,LACTIC ACID POC    Collection Time: 07/07/22  2:03 AM   Result Value Ref Range    Calcium, ionized (POC) 0.96 (L) 1.12 - 1.32 mmol/L    BICARBONATE 29 mmol/L    Base excess (POC) 9.3 mmol/L    Sample source VENOUS BLOOD      CO2, POC 29 (H) 19 - 24 MMOL/L    Sodium,  (L) 136 - 145 MMOL/L    Potassium, POC 5.3 3.5 - 5.5 MMOL/L    Chloride, POC 93 (L) 100 - 108 MMOL/L    Glucose, POC 63 (L) 74 - 106 MG/DL    Creatinine, POC 6.5 (H) 0.6 - 1.3 MG/DL    Lactic Acid (POC) 1.48 0.40 - 2.00 mmol/L    pH, venous (POC) 7.66 (HH) 7.32 - 7.42      pCO2, venous (POC) 25.8 (L) 41 - 51 MMHG    pO2, venous (POC) 56 (H) 25 - 40 mmHg   GLUCOSE, POC    Collection Time: 07/07/22  2:56 AM   Result Value Ref Range    Glucose (POC) 235 (H) 65 - 117 mg/dL    Performed by Norman Bishop (RN)          Imaging:     Assessment:     Owen Briscoe is a 73 y.o. male with history of ESRD on HD, orthostasis, hypertension, paroxysmal atrial fibrillation, BPH status post TURP, chronic anemia, GERD, inflammatory bowel disease who is admitted for sepsis secondary to COVID-19 infection       Plan:       Sepsis secondary to COVID-19 infection  -Admit to and monitor on telemetry  -Chest x-ray shows no acute process  -Obtain CT chest, abdomen pelvis to rule out other sources of infection  -Obtain COVID inflammatory markers  -Therapeutic vitamins  -Procalcitonin, empiric antibiotics  -Follow blood cultures    ESRD on HD  History of chronic hypertension  -Nephro on consult respiratory  -Midodrine and sevelamer    Generalized anxiety  -Continue home lorazepam    GERD  -Protonix    Glaucoma  -Continue home timolol    Chronic pain /debility Dion Binder care  -Continue home [place]  -Consult hospice care to evaluate home dispo                  FEN/GI -  1L NS Bolus in ED  Activity - as tolerated  DVT prophylaxis - Heparin  GI prophylaxis -  NI  Disposition - TBD    CODE STATUS:  FULL.   Presently DNR on home hospice but unable to confirm at this time.        Signed By: Dwayne Borja MD     July 7, 2022

## 2022-07-07 NOTE — PROGRESS NOTES
aLmbert Delgadillo [facility] [place] [address]  [address] [facility], [address] [place] [address]  [phone]      Medical Progress Note      NAME:         Supriya Reid   :        1950  MRM:        [id number]    Date of service: 2022      Chief complaint:     Interval HPI: Patient admitted with severe sepsis. He is not a good historian given his current illnesses. I have discussed with his nurse,  and his hospice social worker who confirmed that he has a signed DNR paperwork. Objective:    Vital Signs:    Visit Vitals  /62 (BP 1 Location: Left leg, BP Patient Position: At rest)   Pulse 84   Temp 97.2 °F (36.2 °C)   Resp 18   Ht 5' 10\" (1.778 m)   Wt 65.9 kg (145 lb 4.5 oz)   SpO2 99%   BMI 20.85 kg/m²          Intake/Output Summary (Last 24 hours) at 2022 1819  Last data filed at 2022 1535  Gross per 24 hour   Intake 1800 ml   Output 0 ml   Net 1800 ml        Physical Examination:    General:   Weak, cachectic and ill looking patient, uncomfortable but not in distress  Eyes:   pink conjunctivae, PERRLA with no discharge. ENT:   no ottorrhea or rhinorrhea with dry mucous membranes  Neck: no masses, thyroid non-tender and trachea central.  Pulm:  no accessory muscle use, decreased breath sounds bilaterally. No wheezing. Card:  no JVD or murmurs, has regular and normal S1, S2 without thrills, bruits or peripheral edema  Abd:  Soft, non-tender, non-distended, normoactive bowel sounds with no palpable organomegaly  Musc:  No cyanosis, clubbing, atrophy or deformities. Skin:  No rashes, bruising or ulcers. Neuro: Awake but seems confused, lapsing to sleep although he could answer some questions.  Otherwise non focal  Psych:  Has little insight to his illness    Current Facility-Administered Medications   Medication Dose Route Frequency    cefepime (MAXIPIME) 1 g in 0.9% sodium chloride (MBP/ADV) 50 mL MBP  1 g IntraVENous Q24H    zinc sulfate (ZINCATE) 50 mg zinc (220 mg) capsule 1 Capsule  1 Capsule Oral DAILY    ascorbic acid (vitamin C) (VITAMIN C) tablet 500 mg  500 mg Oral Q6H    doxycycline (VIBRAMYCIN) 100 mg in 0.9% sodium chloride (MBP/ADV) 100 mL MBP  100 mg IntraVENous Q12H    B complex-vitaminC-folic acid (NEPHROCAP) cap  1 Capsule Oral DAILY    [START ON 7/8/2022] midodrine (PROAMATINE) tablet 10 mg  10 mg Oral Q MON, WED & FRI    pantoprazole (PROTONIX) tablet 40 mg  40 mg Oral DAILY    sevelamer carbonate (RENVELA) tab 800 mg  800 mg Oral TID WITH MEALS    timolol (TIMOPTIC) 0.5 % ophthalmic solution 1 Drop  1 Drop Left Eye DAILY    sodium chloride (NS) flush 5-40 mL  5-40 mL IntraVENous Q8H    sodium chloride (NS) flush 5-40 mL  5-40 mL IntraVENous PRN    acetaminophen (TYLENOL) tablet 650 mg  650 mg Oral Q6H PRN    Or    acetaminophen (TYLENOL) suppository 650 mg  650 mg Rectal Q6H PRN    polyethylene glycol (MIRALAX) packet 17 g  17 g Oral DAILY PRN    ondansetron (ZOFRAN ODT) tablet 4 mg  4 mg Oral Q8H PRN    Or    ondansetron (ZOFRAN) injection 4 mg  4 mg IntraVENous Q6H PRN    heparin (porcine) injection 5,000 Units  5,000 Units SubCUTAneous Q8H    0.9% sodium chloride infusion  75 mL/hr IntraVENous CONTINUOUS    famotidine (PEPCID) tablet 20 mg  20 mg Oral Q24H    LORazepam (ATIVAN) tablet 1 mg  1 mg Oral BID    oxyCODONE IR (ROXICODONE) tablet 5 mg  5 mg Oral BID     Current Outpatient Medications   Medication Sig    LORazepam (Ativan) 1 mg tablet Take 1 mg by mouth two (2) times a day.  oxyCODONE IR (ROXICODONE) 5 mg immediate release tablet Take 5 mg by mouth two (2) times a day.  midodrine (PROAMATINE) 5 mg tablet Take 2 Tabs by mouth every Monday, Wednesday, Friday.  sevelamer (RENAGEL) 800 mg tablet Take 800 mg by mouth three (3) times daily (with meals).  b complex-vitamin c-folic acid (DIALYVITE) 100-1 mg tab tablet Take 1 Tab by mouth daily.     timolol (TIMOPTIC) 0.5 % ophthalmic solution Administer 1 Drop to left eye daily.  pantoprazole (PROTONIX) 40 mg tablet Take 40 mg by mouth daily. Laboratory data and review:    Recent Labs     07/07/22 0152   WBC 3.3*   HGB 10.4*   HCT 30.3*   *     Recent Labs     07/07/22 0152   *   K 5.0   CL 92*   CO2 30   GLU 74   BUN 33*   CREA 6.05*   CA 8.9   MG 2.2   PHOS 2.4*   ALB 2.2*   ALT 9*     No components found for: [place]    Diagnostics: Imaging studies have been reviewed    Telemetry reviewed by me:   normal sinus rhythm    Assessment and Plan:    Acute metabolic encephalopathy (4/13/2756) POA: this is likely his primary issue having developed AMS, missed his dialysis, poor oral intake in the setting of CoV-19 viral infection. Had HD today. Keep IV antibiotics for now. Continue supportive care. Diet as tolerated. He is a DNR on hospice. Monitor clinical progress    Severe sepsis ([facility] [address] (7/7/2022) POA: presumed on admission. I suspect this was due to CoV-19 given no other apparent source of a bacterial infection at this time. CXR and CTA chest show no areas of consolidation. Blood cultures neg thus far. Keep empiric IV Cefepime and Vancomycin for now but de-escalate if no source identified     ESRD (end stage renal disease) on dialysis ([facility] [address] (5/11/2021) / Renal transplant recipient (6/21/2018) POA: he is on hospice but has HD sessions for comfort. Had missed his session partly leading to current symptoms. Continue HD as per nephrology. Usually on a TTS schedule. Has chronic hypotension. CM to arrange for discharge HD sessions given he has CoV-19. Outpatient continued hospice care. Continue Renvela, Midodrine    Severe protein-calorie malnutrition ([facility] [address] (7/7/2022) POA: he is very cachectic. BMI of 20. Has poor appetite leading to clinical deterioration. Worsened by current viral infection. Continue diet as tolerated. On hospice care    COVID-19 virus infection (7/7/2022) POA: has mild symptoms. Not hypoxic. Supportive care. Zinc and Vitamin C    GERD (gastroesophageal reflux disease) (6/21/2018) POA: continue PPi, Famotidine    Benign prostatic hyperplasia without lower urinary tract symptoms (6/21/2018) POA: sp TURP (status post transurethral resection of prostate) (6/21/2018): Overview: No longer voiding and on dialysis. Monitor    Physical Debility / Chronic gout of multiple sites due to renal impairment (6/21/2018) / Chronic lymphedema RUE POA: continue pain control as needed    PAF (paroxysmal atrial fibrillation) ([facility] [address] (3/15/2020) POA: currently in NSR. Stable. Monitor    Anemia due to chronic kidney disease (7/7/2022) POA: Hgb stable.  Monitor    Glaucoma POA: continue eye gtts    Total time spent for the patient's care: [address] discussed with: Patient, Care Manager, Nursing Staff and Hospice team    Discussed:  Care Plan    Prophylaxis:  Hep SQ and H2B/PPI    Anticipated Disposition:  Hospice           ___________________________________________________    Attending Physician:   Faith Harris MD

## 2022-07-07 NOTE — PROGRESS NOTES
Gold colored ring found in pt's bed under pts left shoulder. Néstor Hall-pt's caregiver given gold colored ring. Witness to this event is staff [employer].

## 2022-07-07 NOTE — PROGRESS NOTES
Owen Briscoe  YOB: 1950      Assessment & Plan:     [facility])  Mild Hyperkalemia  COVID 19 +  Chronic hypotension    -Plan for HD today then TTS schedule  -cont Midodrine   -cont Renvela  -upon DC he will need COVID cohort clinic at [facility]:   CC: f/u [facility]-HD   HPI: Patient seen from outside the door through glass window  ROS:unable to obtain  Current Facility-Administered Medications   Medication Dose Route Frequency    ergocalciferol capsule 50,000 Units  50,000 Units Oral ONCE    cefepime (MAXIPIME) 1 g in 0.9% sodium chloride (MBP/ADV) 50 mL MBP  1 g IntraVENous Q24H    zinc sulfate (ZINCATE) 50 mg zinc (220 mg) capsule 1 Capsule  1 Capsule Oral DAILY    ascorbic acid (vitamin C) (VITAMIN C) tablet 500 mg  500 mg Oral Q6H    ergocalciferol capsule 50,000 Units  50,000 Units Oral ONCE    doxycycline (VIBRAMYCIN) 100 mg in 0.9% sodium chloride (MBP/ADV) 100 mL MBP  100 mg IntraVENous Q12H    B complex-vitaminC-folic acid (NEPHROCAP) cap  1 Capsule Oral DAILY    [START ON 7/8/2022] midodrine (PROAMATINE) tablet 10 mg  10 mg Oral Q MON, WED & FRI    pantoprazole (PROTONIX) tablet 40 mg  40 mg Oral DAILY    sevelamer carbonate (RENVELA) tab 800 mg  800 mg Oral TID WITH MEALS    timolol (TIMOPTIC) 0.5 % ophthalmic solution 1 Drop  1 Drop Left Eye DAILY    sodium chloride (NS) flush 5-40 mL  5-40 mL IntraVENous Q8H    sodium chloride (NS) flush 5-40 mL  5-40 mL IntraVENous PRN    acetaminophen (TYLENOL) tablet 650 mg  650 mg Oral Q6H PRN    Or    acetaminophen (TYLENOL) suppository 650 mg  650 mg Rectal Q6H PRN    polyethylene glycol (MIRALAX) packet 17 g  17 g Oral DAILY PRN    ondansetron (ZOFRAN ODT) tablet 4 mg  4 mg Oral Q8H PRN    Or    ondansetron (ZOFRAN) injection 4 mg  4 mg IntraVENous Q6H PRN    heparin (porcine) injection 5,000 Units  5,000 Units SubCUTAneous Q8H    0.9% sodium chloride infusion  75 mL/hr IntraVENous CONTINUOUS  famotidine (PEPCID) tablet 20 mg  20 mg Oral Q24H    LORazepam (ATIVAN) tablet 1 mg  1 mg Oral BID    oxyCODONE IR (ROXICODONE) tablet 5 mg  5 mg Oral BID     Current Outpatient Medications   Medication Sig    LORazepam (Ativan) 1 mg tablet Take 1 mg by mouth two (2) times a day.  oxyCODONE IR (ROXICODONE) 5 mg immediate release tablet Take 5 mg by mouth two (2) times a day.  midodrine (PROAMATINE) 5 mg tablet Take 2 Tabs by mouth every Monday, Wednesday, Friday.  sevelamer (RENAGEL) 800 mg tablet Take 800 mg by mouth three (3) times daily (with meals).  b complex-vitamin c-folic acid (DIALYVITE) 100-1 mg tab tablet Take 1 Tab by mouth daily.  timolol (TIMOPTIC) 0.5 % ophthalmic solution Administer 1 Drop to left eye daily.  pantoprazole (PROTONIX) 40 mg tablet Take 40 mg by mouth daily. Objective:     Vitals:  Blood pressure 108/61, pulse (!) 115, temperature 100 °F (37.8 °C), resp. rate 25, height 5' 10\" (1.778 m), weight 65.9 kg (145 lb 4.5 oz), SpO2 99 %. Temp (24hrs), Av °F (38.3 °C), Min:100 °F (37.8 °C), Max:102.7 °F (39.3 °C)      Intake and Output:  No intake/output data recorded.  1901 -  0700  In: 1800 [I.V.:1800]  Out: -     Physical Exam:                   Physical Examination:   PE not performed d/t COVID 19 infection precaution          ECG/rhythm:    Data Review      No results for input(s): TNIPOC in the last 72 hours. No lab exists for component: ITNL   No results for input(s): CPK, CKMB, TROIQ in the last 72 hours. Recent Labs     22  0152   *   K 5.0   CL 92*   CO2 30   BUN 33*   CREA 6.05*   GLU 74   PHOS 2.4*   MG 2.2   CA 8.9   ALB 2.2*   WBC 3.3*   HGB 10.4*   HCT 30.3*   *      No results for input(s): INR, PTP, APTT, INREXT in the last 72 hours.   Needs: urine analysis, urine sodium, protein and creatinine  No results found for: Robert Cramer            Discussed with:  Staff    : Addie Osler, MD  7/7/2022        [facility]:  [email or web address]  Jayesh Hoyt office:  [address] [place] [address], [address] [place] [address],[address]  [address]  Phone: [phone]  Fax :     [phone]    Prakash office:  [address] [facility]  Prakash [facility]  Phone - [phone]  Fax - [phone]

## 2022-07-07 NOTE — PROGRESS NOTES
7/7/2022  11:07 AM  Case management note    Reason for Admission:  Septic    Patient came to hospital for leg pain. Patient has COVID and missed his last dialysis. [facility]  Asked family regarding goals of care, to continue dialysis and make comfortable. Daquan De La Torre    RN for hospice                     RUR Score:          17%           Plan for utilizing home health:     Open to [facility]     PCP: First and Last name:  Gerri Parks NP     Name of Practice:    Are you a current patient: Yes/No:    Approximate date of last visit:    Can you participate in a virtual visit with your PCP:                     Current Advanced Directive/Advance Care Plan: Full Code no AD, spoke with family who will let md know of patient wishes      Healthcare Decision Maker:                Primary Decision Maker: Jared Martinez - Daughter - [phone]                  Transition of Care Plan:           1. Home with hospice  2. Continue dialysis  3. AD planning  4. CM to follow for discharge needs    Care Management Interventions  PCP Verified by CM: Yes ( Vibha Guerra NP)  Mode of Transport at Discharge:  Other (see comment)  Support Systems: Spouse/Significant Other  Confirm Follow Up Transport: Other (see comment)  The Plan for Transition of Care is Related to the Following Treatment Goals : septic/ [place]  Discharge Location  Patient Expects to be Discharged to[de-identified] Unable to determine at this time  Erin Pichardo

## 2022-07-07 NOTE — PROGRESS NOTES
FYI: According to patients hospice julisa, pt continues to be on HD despite hospice admission due to his belief that stopping dialysis will be a form of suicide and he would therefore go to [facility]. Continuation is for Evangelical beliefs, julisa continues to try and educated patient on the inaccuracy of this assumption. Will continue to monitor.

## 2022-07-07 NOTE — ED NOTES
ED visit d/t AMS / leg pain per EMS - onset of sxs, this evening - comes from home, hospice patient d/t ESRD / CKD - EMS states pt missed his last dialysis session - pt reports being \" help me. 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 / end-stage renal disease on dialysis / renal transplant recipient / inflammatory bowel disease / chronic anemia;;     Pt recently admitted 03/24/2022 due to arm swelling / ESRD with concern with graft infection;;    Febrile on arrival / tachycardiac    Non blanchable wound to coccyx;;    Per home health aide, family has not fully signed a DNR document at this time     0430 - pt resting in bed with HOB elevated - remains on monitor x4 - remains on RA with normal POX;; pt remains with restless / confusion / requesting to extend bilat LE yet bilat LE are contracted and not able to fulfill this request;; pending for assessment from admitting hospitalist;;    VS - remains w. sinus tach / improving oral temperature / normotensive / normal POX on RA;;    0620 - pt had a large brown loose stool - cleaned and cleansed pt - change linens / gown / pillow case / cardiac leads;;     3857 - pt sent to imaging this time;; CT abd / chest       0720 - Bedside shift change report given to Kalli Miller RN (oncoming nurse) by Eleazar Weston RN (offgoing nurse).  Report included the following information SBAR, Kardex, ED Summary, Intake/Output and MAR.     0705 - called and spoke with [email or web address] managing the patient prior to arrival to the ED - spoke with Leslie Burnett RN with [address] [facility];;    Best number to reach a representative will be [phone] at [facility]

## 2022-07-07 NOTE — ED PROVIDER NOTES
72-year-old male with a past medical history significant for BPH, chronic anemia, chronic renal disease and hemodialysis dependent, ED, gout, hypertension, inflammatory bowel disease, renal transplant recipient, status post TURP, hospice and DNR who presents to the ER by EMS from home accompanied by his caregiver for evaluation for increased confusion, disorientation, leg pain, weakness with decreased appetite and failure to thrive. The patient is accompanied by his caregiver who state that the patient missed dialysis yesterday and has had very little to eat or drink and complaining of increased leg pain as well as increased confusion and disorientation. 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 12/19/19 hGb is 12.4 with HcT 36.5 His chronic anemia could be contributory to his fatigue. H/H ok on Cassandra 10, 2020 labs.  Chronic GERD     Chronic kidney disease     Erectile dysfunction     Gout     Hypertension     Ill-defined condition     dialysis access to left arm    Inflammatory bowel disease     Meningitis     Renal transplant recipient     S/P TURP 2015    Stage 5 chronic kidney disease on chronic dialysis ([facility] [address] 7/1/2020    He has failed bilateral renal transplants, and is dialysis dependent at this time. He is followed by Dr. Clint Arceo, nephrology. His creatinine on 10/5/18 was 6.37 His creatinine on 12/19/19 was 6.65. Creatinine on 3/31/2020 was 6.41. Creatinineo on 6/10/2020 was 3.84    Testicular hypofunction 7/1/2020    He was last seen by a provider on 5/28/2020 via virtual visit. His testosterone level was found to be 79 on 3/31/2020. His levels have not improved on 200 mg IM q 2 weeks. He still notes fatigue and ED, though somewhat improved. LFT ok (3/31/2020). 6/10/2020 testosterone (stas) is 1304.  Repeat test on 6/25/2020 testosterone 1059 (stas) with free at 24.1.   He has a history of fatigue, low libido,    Thyroid disease     Tobacco use disorder        Past Surgical History:   Procedure Laterality Date    HX CATARACT REMOVAL      HX HEART VALVE SURGERY      HX HERNIA REPAIR      HX TURP  2015    MO APPENDECTOMY      MO INSJ TUNNELED CVC W/O SUBQ PORT/ AGE 5 YR/> N/A 5/13/2021    insertion tunneled central venous catheter -  perm cath performed by Michael Canseco MD at [address] CATH LAB    MO INTRO CATH DIALYSIS CIRCUIT DX [address] S&I N/A 5/13/2021    FISTULOGRAM LEFT performed by Michael Canseco MD at [address] CATH LAB    MO TRANSPLANTATION OF KIDNEY Bilateral 1971,2005    x2    MO TRLUML BALO ANGIOP CTR DIALYSIS SEG W/IMG S&I N/A 5/13/2021    Pta Central Vein Av Graft performed by Michael Canseco MD at [address] CATH LAB         Family History:   Problem Relation Age of Onset    Heart Disease Mother     Lung Disease Father     No Known Problems Sister     No Known Problems Brother     No Known Problems Brother        Social History     Socioeconomic History    Marital status:      Spouse name: Mariam Alcantara Number of children: 3    Years of education: 13    Highest education level: Not on file   Occupational History    Occupation: Retired    Tobacco Use    Smoking status: Current Every Day Smoker     Packs/day: 1.00     Years: 40.00     Pack years: 40.00     Types: Cigarettes    Smokeless tobacco: Never Used   Substance and Sexual Activity    Alcohol use: Not Currently     Comment: socially    Drug use: No    Sexual activity: Yes   Other Topics Concern     Service Not Asked    Blood Transfusions Not Asked    Caffeine Concern Not Asked    Occupational Exposure Not Asked    Hobby Hazards Not Asked    Sleep Concern Not Asked    Stress Concern Not Asked    Weight Concern Not Asked    Special Diet Not Asked    Back Care Not Asked    Exercise Not Asked    Bike Helmet Not Asked   [address] Not Asked    Self-Exams Not Asked   Social History Narrative    Not on file     Social Determinants of Health     Financial Resource Strain:     Difficulty of Paying Living Expenses: Not on file   Food Insecurity:     Worried About Running Out of Food in the Last Year: Not on file    Hannah of Food in the Last Year: Not on file   Transportation Needs:     Lack of Transportation (Medical): Not on file    Lack of Transportation (Non-Medical): Not on file   Physical Activity:     Days of Exercise per Week: Not on file    Minutes of Exercise per Session: Not on file   Stress:     Feeling of Stress : Not on file   Social Connections:     Frequency of Communication with Friends and Family: Not on file    Frequency of Social Gatherings with Friends and Family: Not on file    Attends Latter-day Services: Not on file    Active Member of Clubs or Organizations: Not on file    Attends Club or Organization Meetings: Not on file    Marital Status: Not on file   Intimate Partner Violence:     Fear of Current or Ex-Partner: Not on file    Emotionally Abused: Not on file    Physically Abused: Not on file    Sexually Abused: Not on file   Housing Stability:     Unable to Pay for Housing in the Last Year: Not on file    Number of [place] in the Last Year: Not on file    Unstable Housing in the Last Year: Not on file         ALLERGIES: Levaquin [levofloxacin]    Review of Systems   Unable to perform ROS: Acuity of condition       Vitals:    07/07/22 0126 07/07/22 0220   BP: 118/61 122/84   Pulse: (!) 110 (!) 114   Resp: 20 25   Temp: (!) 102.7 °F (39.3 °C)    SpO2: 97% 93%   Weight: 65.9 kg (145 lb 4.5 oz)    Height: 5' 10\" (1.778 m)             Physical Exam  Vitals and nursing note reviewed. Exam conducted with a chaperone present.         CONSTITUTIONAL: Frail and cachectic elderly male who appears chronically ill; in mild distress  HEAD: Normocephalic; atraumatic  EYES: PERRL; EOM intact; conjunctiva and sclera are clear bilaterally. ENT: No rhinorrhea; normal pharynx with no tonsillar hypertrophy; mucous membranes pink/moist, no erythema, no exudate. NECK: Supple; non-tender; no cervical lymphadenopathy  CARD: Normal S1, S2; no murmurs, rubs, or gallops. Regular rate and rhythm. RESP: Normal respiratory effort; breath sounds clear and equal bilaterally; no wheezes, rhonchi, or rales. ABD: Normal bowel sounds; non-distended; non-tender; no palpable organomegaly, no masses, no bruits. Back Exam: Normal inspection; no vertebral point tenderness, no CVA tenderness. Normal range of motion. EXT: Swelling or tenderness of the right upper extremity with edema with obvious chronic no deformity distal pulses are normal, no edema. SKIN: Warm; dry; no rash. NEURO:Alert, AMARI-XII grossly intact, sensory and motor are non-focal.      MDM  Number of Diagnoses or Management Options  Diagnosis management comments: Assessment: 65-year-old male with multiple chronic medical problems including hospice and DNR, end-stage renal disease, failure to thrive and chronic leg pain who has a fever and need evaluation for sepsis with occult bacteremia. The patient allegedly is positive for COVID infection at this time. Plan: Lab/IV fluid/EKG/chest x-ray/antipyretic/broad-spectrum antibiotic/consult hospitalist/ Monitor and Reevaluate.          Amount and/or Complexity of Data Reviewed  Clinical lab tests: ordered and reviewed  Tests in the radiology section of CPT®: ordered and reviewed  Tests in the medicine section of CPT®: reviewed and ordered  Discussion of test results with the performing providers: yes  Decide to obtain previous medical records or to obtain history from someone other than the patient: yes  Obtain history from someone other than the patient: yes  Review and summarize past medical records: yes  Discuss the patient with other providers: yes  Independent visualization of images, tracings, or specimens: yes    Risk of Complications, Morbidity, and/or Mortality  Presenting problems: moderate  Diagnostic procedures: moderate  Management options: moderate           Procedures    ED EKG interpretation:  Rhythm: Accelerated junctional rhythm; and regular . Rate (approx.): 115; Axis: left axis deviation; QRS interval: prolonged; ST/T wave: non-specific changes; in  Lead: Diffusely; left anterior fascicular block;other findings: abnormal ekg. EKG documented by Ceferino Saravia MD, scribe, as interpreted by Fermin Matt MD, ED MD.    Willian Abad INTERPRETATION (ED MD)  Chest Xray  No acute process seen. Normal heart size. No bony abnormalities. No infiltrate. Fermin Matt MD 3:13 AM    PROGRESS NOTE:  Pt has been reexamined by Fermin Matt MD all available results have been reviewed with pt and any available family. Pt understands sx, dx, and tx in ED. Care plan has been outlined and questions have been answered. Pt and any available family understands and agrees to need for admission to hospital for further tx not available in ED. Pt is ready for admission. Written by Ceferino Saravia MD,  3:15 AM    CONSULT NOTE:  Fermin Matt MD spoke with Dr. Favian Salinas of the adult hospitalist team. Discussed patient's presentation, history, physical assessment, and available diagnostic results. He will evaluate, write orders and admit the patient to the hospital. 3:16 AM    Perfect Serve Consult for Admission  3:43 AM    ED Room Number: ER05/05  Patient Name and age:  Brad Waterman 73 y.o.  male  Working Diagnosis:   1. Severe sepsis ([address]    2. Failure to thrive (child)    3. COVID-19 virus infection    4. End stage renal disease on dialysis ([address]    5.  Hypoglycemia        COVID-19 Suspicion:  yes  Sepsis present:  yes  Reassessment needed: no  Code Status:  DNR  Readmission: no  Isolation Requirements:  yes  Recommended Level of Care:  med/surg  Department: [facility] ED - [phone]  Admitting Provider: Dr. Tammie Delong      Total critical care time spent exclusive of procedures: 65 minutes. Conrad Dinero

## 2022-07-07 NOTE — PROGRESS NOTES
[facility] Dosing Services:     Pharmacist Renal Dosing Progress Note for Cefepime and Pepcid  Physician Liane    The following medication: Cefepime, was automatically dose-adjusted per [facility] P&T Committee Protocol, with respect to renal function. Consult provided for this   73 y.o. , male , for the indication of PNA. Pt Weight:   Wt Readings from Last 1 Encounters:   07/07/22 65.9 kg (145 lb 4.5 oz)         Previous Regimen 2 G every 12 hours   Serum Creatinine Lab Results   Component Value Date/Time    Creatinine 6.05 (H) 07/07/2022 01:52 AM    Creatinine, POC 6.5 (H) 07/07/2022 02:03 AM       Creatinine Clearance Estimated Creatinine Clearance: 10.7 mL/min (A) (based on SCr of 6.05 mg/dL (H)). BUN Lab Results   Component Value Date/Time    BUN 33 (H) 07/07/2022 01:52 AM    BUN (POC) 45 (H) 03/01/2015 01:27 PM           Dosage changed to: For CrCl<=10/HD, Dosing is 1 gram every 24 hours extended infusion. Also dosing adjustment made to Pepcid: for HD patients, 20 mg PO Q24H. Pharmacy to continue to monitor patient daily. Will make dosage adjustments based upon changing renal function. Signed Allan Vizcarra.  Contact information:  [phone]

## 2022-07-07 NOTE — PROCEDURES
Hemodialysis / [phone]    Vitals Pre Post Assessment Pre Post   BP   LOC  A&O to self   HR   Lungs  Clear and diminished   Resp  18 Cardiac  WNL   Temp  97.2 Skin  Warm and dry   Weight   Unable to due to er bed Edema  +4 in SAI-AVG, Neph aware and Vascular consult is in   Tele status  tele Pain  0     Orders   Duration: Start: 1235 End: 1335 Total: 3hrs   Dialyzer: Dialyzer/Set Up Inspection: Revaclear (07/07/22 1235)   K Bath: Dialysate K (mEq/L): 2 (07/07/22 1235)   Ca Bath: Dialysate CA (mEq/L): 2.5 (07/07/22 1235)   Na: Dialysate NA (mEq/L): 138 (07/07/22 1235)   Bicarb: Dialysate HCO3 (mEq/L): 35 (07/07/22 1235)   Target Fluid Removal: Goal/Amount of Fluid to Remove (mL): 0 mL (07/07/22 1235)     Access   Type & Location: SAI-AVG- very swollen, cannulated by ALECIA Barger   Comments:                                        Labs   HBsAg (Antigen) / date:     Neg                                          HBsAb (Antibody) / date: Imm   Source: Portal, redrawn by ALECIA Barger   Obtained/Reviewed  Critical Results Called HGB   Date Value Ref Range Status   07/07/2022 10.4 (L) 12.1 - 17.0 g/dL Final     Potassium   Date Value Ref Range Status   07/07/2022 5.0 3.5 - 5.1 mmol/L Final     Calcium   Date Value Ref Range Status   07/07/2022 8.9 8.5 - 10.1 MG/DL Final     BUN   Date Value Ref Range Status   07/07/2022 33 (H) 6 - 20 MG/DL Final     Creatinine   Date Value Ref Range Status   07/07/2022 6.05 (H) 0.70 - 1.30 MG/DL Final        Meds Given   Name Dose Route                    Adequacy / Fluid    Total Liters Process:    Net Fluid Removed:       Comments   Time Out Done:   (Time) N/a time out done by ALECIA Barger and MYAH Gaston LPN took over pt care 1350   Admitting Diagnosis: COVID+   Consent obtained/signed: Informed Consent Verified:  (chronic patient) (07/07/22 1235)   Machine / RO # Machine Number: [id number] (07/07/22 0511)   Primary Nurse Rpt Pre:    Primary Nurse Rpt Post: Kailyn Kirkland RN   Pt Education:    Care Plan:    Pts outpatient clinic:      Tx Summary   Comments:       Took over pt care at 1350 from MICHELLE Garcia RN    1400:VSS  [id number]: GFVCEJJ Nurse at bedside           1430: No distress  1445: VSS  1500: pt resting with eyes shut  1515: No distress    1535: Pt finished treatment, all blood returned.  Needles removed and all bleeding stopped

## 2022-07-08 LAB
HBV SURFACE AB SER QL: REACTIVE
HBV SURFACE AB SER-ACNC: 26.71 MIU/ML
HBV SURFACE AG SER QL: <0.1 INDEX
HBV SURFACE AG SER QL: NEGATIVE

## 2022-07-08 PROCEDURE — 74011250637 HC RX REV CODE- 250/637: Performed by: STUDENT IN AN ORGANIZED HEALTH CARE EDUCATION/TRAINING PROGRAM

## 2022-07-08 PROCEDURE — 87340 HEPATITIS B SURFACE AG IA: CPT

## 2022-07-08 PROCEDURE — 36415 COLL VENOUS BLD VENIPUNCTURE: CPT

## 2022-07-08 PROCEDURE — 74011000258 HC RX REV CODE- 258: Performed by: STUDENT IN AN ORGANIZED HEALTH CARE EDUCATION/TRAINING PROGRAM

## 2022-07-08 PROCEDURE — 94760 N-INVAS EAR/PLS OXIMETRY 1: CPT

## 2022-07-08 PROCEDURE — 74011000250 HC RX REV CODE- 250: Performed by: STUDENT IN AN ORGANIZED HEALTH CARE EDUCATION/TRAINING PROGRAM

## 2022-07-08 PROCEDURE — 86706 HEP B SURFACE ANTIBODY: CPT

## 2022-07-08 PROCEDURE — 65270000029 HC RM PRIVATE

## 2022-07-08 PROCEDURE — 74011250636 HC RX REV CODE- 250/636: Performed by: STUDENT IN AN ORGANIZED HEALTH CARE EDUCATION/TRAINING PROGRAM

## 2022-07-08 PROCEDURE — 90935 HEMODIALYSIS ONE EVALUATION: CPT

## 2022-07-08 RX ADMIN — SEVELAMER CARBONATE 800 MG: 800 TABLET, FILM COATED ORAL at 12:46

## 2022-07-08 RX ADMIN — ZINC SULFATE 220 MG (50 MG) CAPSULE 1 CAPSULE: CAPSULE at 09:32

## 2022-07-08 RX ADMIN — OXYCODONE HYDROCHLORIDE AND ACETAMINOPHEN 500 MG: 500 TABLET ORAL at 12:46

## 2022-07-08 RX ADMIN — SODIUM CHLORIDE, PRESERVATIVE FREE 10 ML: 5 INJECTION INTRAVENOUS at 06:11

## 2022-07-08 RX ADMIN — OXYCODONE HYDROCHLORIDE AND ACETAMINOPHEN 500 MG: 500 TABLET ORAL at 06:10

## 2022-07-08 RX ADMIN — OXYCODONE 5 MG: 5 TABLET ORAL at 09:32

## 2022-07-08 RX ADMIN — SODIUM CHLORIDE, PRESERVATIVE FREE 10 ML: 5 INJECTION INTRAVENOUS at 14:49

## 2022-07-08 RX ADMIN — PANTOPRAZOLE SODIUM 40 MG: 40 TABLET, DELAYED RELEASE ORAL at 09:32

## 2022-07-08 RX ADMIN — DOXYCYCLINE 100 MG: 100 INJECTION, POWDER, LYOPHILIZED, FOR SOLUTION INTRAVENOUS at 09:32

## 2022-07-08 RX ADMIN — SEVELAMER CARBONATE 800 MG: 800 TABLET, FILM COATED ORAL at 09:32

## 2022-07-08 RX ADMIN — LORAZEPAM 1 MG: 1 TABLET ORAL at 09:32

## 2022-07-08 RX ADMIN — NEPHROCAP 1 CAPSULE: 1 CAP ORAL at 09:32

## 2022-07-08 RX ADMIN — ACETAMINOPHEN 650 MG: 325 TABLET ORAL at 04:03

## 2022-07-08 RX ADMIN — HEPARIN SODIUM 5000 UNITS: 5000 INJECTION INTRAVENOUS; SUBCUTANEOUS at 14:49

## 2022-07-08 RX ADMIN — LORAZEPAM 1 MG: 1 TABLET ORAL at 20:51

## 2022-07-08 RX ADMIN — TIMOLOL MALEATE 1 DROP: 5 SOLUTION OPHTHALMIC at 09:40

## 2022-07-08 RX ADMIN — HEPARIN SODIUM 5000 UNITS: 5000 INJECTION INTRAVENOUS; SUBCUTANEOUS at 06:11

## 2022-07-08 RX ADMIN — OXYCODONE HYDROCHLORIDE AND ACETAMINOPHEN 500 MG: 500 TABLET ORAL at 01:29

## 2022-07-08 RX ADMIN — OXYCODONE 5 MG: 5 TABLET ORAL at 20:51

## 2022-07-08 NOTE — PROGRESS NOTES
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    -Plan for HD today then MWF schedule  -cont Midodrine   -cont Renvela  vascular surgery input noted  -upon DC he will need [facility] clinic at [address] [place] [address] notified         Subjective:   CC: f/u [facility]-HD   HPI: Patient seen from outside the door through glass window  Had HD yesterday  ROS:unable to obtain  Current Facility-Administered Medications   Medication Dose Route Frequency    cefepime (MAXIPIME) 1 g in 0.9% sodium chloride (MBP/ADV) 50 mL MBP  1 g IntraVENous Q24H    zinc sulfate (ZINCATE) 50 mg zinc (220 mg) capsule 1 Capsule  1 Capsule Oral DAILY    ascorbic acid (vitamin C) (VITAMIN C) tablet 500 mg  500 mg Oral Q6H    doxycycline (VIBRAMYCIN) 100 mg in 0.9% sodium chloride (MBP/ADV) 100 mL MBP  100 mg IntraVENous Q12H    B complex-vitaminC-folic acid (NEPHROCAP) cap  1 Capsule Oral DAILY    midodrine (PROAMATINE) tablet 10 mg  10 mg Oral Q MON, WED & FRI    pantoprazole (PROTONIX) tablet 40 mg  40 mg Oral DAILY    sevelamer carbonate (RENVELA) tab 800 mg  800 mg Oral TID WITH MEALS    timolol (TIMOPTIC) 0.5 % ophthalmic solution 1 Drop  1 Drop Left Eye DAILY    sodium chloride (NS) flush 5-40 mL  5-40 mL IntraVENous Q8H    sodium chloride (NS) flush 5-40 mL  5-40 mL IntraVENous PRN    acetaminophen (TYLENOL) tablet 650 mg  650 mg Oral Q6H PRN    Or    acetaminophen (TYLENOL) suppository 650 mg  650 mg Rectal Q6H PRN    polyethylene glycol (MIRALAX) packet 17 g  17 g Oral DAILY PRN    ondansetron (ZOFRAN ODT) tablet 4 mg  4 mg Oral Q8H PRN    Or    ondansetron (ZOFRAN) injection 4 mg  4 mg IntraVENous Q6H PRN    heparin (porcine) injection 5,000 Units  5,000 Units SubCUTAneous Q8H    famotidine (PEPCID) tablet 20 mg  20 mg Oral Q24H    LORazepam (ATIVAN) tablet 1 mg  1 mg Oral BID    oxyCODONE IR (ROXICODONE) tablet 5 mg  5 mg Oral BID          Objective:     Vitals:  Blood pressure (!) 107/54, pulse 85, temperature 97.3 °F (36.3 °C), resp. rate 16, height 5' 10\" (1.778 m), weight 65.9 kg (145 lb 4.5 oz), SpO2 100 %. Temp (24hrs), Av.7 °F (36.5 °C), Min:96.9 °F (36.1 °C), Max:98.6 °F (37 °C)      Intake and Output:  701 - 1900  In: 1077.5 [P.O.:240; I.V.:837.5]  Out: -   1901 -  07  In: 1900 [I.V.:1900]  Out: 0     Physical Exam:                   Physical Examination:   PE not performed d/t COVID 19 infection precaution          ECG/rhythm:    Data Review      No results for input(s): TNIPOC in the last 72 hours. No lab exists for component: ITNL   No results for input(s): CPK, CKMB, TROIQ in the last 72 hours. Recent Labs     22  0152   *   K 5.0   CL 92*   CO2 30   BUN 33*   CREA 6.05*   GLU 74   PHOS 2.4*   MG 2.2   CA 8.9   ALB 2.2*   WBC 3.3*   HGB 10.4*   HCT 30.3*   *      No results for input(s): INR, PTP, APTT, INREXT, INREXT in the last 72 hours. Needs: urine analysis, urine sodium, protein and creatinine  No results found for: Gisela Worley            Discussed with:  Staff    : Lazarus Chough, MD  2022        [address] [place] [address] [facility]:  [email or web address]  Anjali Craven office:  [address] [place] [address], [address] [place] [address],[address]  [place], [address] [place] [address]  Phone: [phone]  Fax :     [phone]    [address] [place] [address] office:  [address]  Nadira Conley  Phone - [phone]  Fax - [phone]

## 2022-07-08 NOTE — ROUTINE PROCESS
2100  Report given to Georgi Briceno (oncoming nurse) by Lara Hernandez RN (offgoing nurse). Report included the following information SBAR, Kardex, ED Summary, Intake/Output, and Recent Results. 2155  Transport called and said they were ready to bring the patient up.     2200  TRANSFER - IN REPORT:    Verbal report received from Kristin(name) on Sarah Billing  being received from ED(unit) for routine progression of care      Report consisted of patients Situation, Background, Assessment and   Recommendations(SBAR). Information from the following report(s) SBAR, Kardex, Intake/Output, MAR, Med Rec Status and Cardiac Rhythm NSR was reviewed with the receiving nurse. Opportunity for questions and clarification was provided. Assessment completed upon patients arrival to unit and care assumed. 2245  Unable to complete admission documentation due to patients altered mental status. 0700  Bedside shift change report given to Herber Granados RN (oncoming nurse) by Georgi Briceno (offgoing nurse). Report included the following information SBAR, Kardex, ED Summary, Intake/Output, and Recent Results. This patient was assisted with Intentional Toileting every 2 hours during this shift as appropriate. Documentation of ambulation and output reflected on Flowsheet as appropriate. Purposeful hourly rounding was completed using AIDET and 5Ps. Outcomes of PHR documented as they occurred. Bed alarm in use as appropriate. Dual Suction and ambubag in place.

## 2022-07-08 NOTE — ED NOTES
TRANSFER - OUT REPORT:    Verbal report given to ALECIA Franklin(name) on Bebe Buckley  being transferred to [facility](unit) for routine progression of care       Report consisted of patients Situation, Background, Assessment and   Recommendations(SBAR). Information from the following report(s) SBAR, ED Summary, Procedure Summary and MAR was reviewed with the receiving nurse. Lines:   Peripheral IV 07/07/22 Right (Active)   Site Assessment Clean, dry, & intact 07/07/22 0743   Phlebitis Assessment 0 07/07/22 0743   Infiltration Assessment 0 07/07/22 0743   Dressing Status Clean, dry, & intact 07/07/22 0743   Dressing Type Tape;Transparent 07/07/22 0743   Hub Color/Line Status Pink;Capped 07/07/22 0743   Action Taken Open ports on tubing capped 07/07/22 0743       Peripheral IV 07/07/22 Right (Active)   Site Assessment Clean, dry, & intact 07/07/22 0743   Phlebitis Assessment 0 07/07/22 0743   Infiltration Assessment 0 07/07/22 0743   Dressing Status Clean, dry, & intact 07/07/22 0743   Dressing Type Tape;Transparent 07/07/22 0743   Hub Color/Line Status Purple;Capped 07/07/22 5017   Action Taken Open ports on tubing capped 07/07/22 0743        Opportunity for questions and clarification was provided.       Patient transported with:   Monitor

## 2022-07-08 NOTE — PROGRESS NOTES
3:50 pm:  CM s/w Marley with [address] (c: [phone]) and provided update. CM to notify on-call RN if patient discharges over the weekend ([phone]) and send AVS in [employer]. 3:30 pm:  CM s/w patient's daughter, Chloe Gold ([phone]) to provide update on dialysis location and notify of potential d/c tomorrow. She said they use [facility] for transport to/from dialysis or patient's wife transports, but she has COVID. CM called [facility] but they do not transport COVID + patients. CM called [facility] for pricing - $173.88 for roundtrip to dialysis and $90.38 for d/c transport. Both transports on will call with [facility]. Daughter notified of pricing    12:30 pm:  CM s/w aKren at [facility]. Patient is COVID + and needs a cohort chair. Patient has been arranged to have dialysis at [facility] at 8 am starting Monday, 7/11/22. AVS has been updated.     Madhuri Wheeler LCSW

## 2022-07-08 NOTE — PROGRESS NOTES
Comprehensive Nutrition Assessment    Type and Reason for Visit: Initial    Nutrition Recommendations/Plan:   1. Recommend continuing Regular diet - most like comfort 2/2 hospice on d/c.  2. Give Nepro BID for ease of PO intake  3. Monitor dispo- hospice w HD     Malnutrition Assessment:  Malnutrition Status: Moderate malnutrition (07/08/22 1221)    Context:  Acute illness     Findings of the 6 clinical characteristics of malnutrition:   Energy Intake:  Unable to assess  Weight Loss:  7.5% over 3 months     Body Fat Loss:  Unable to assess,     Muscle Mass Loss:  Unable to assess,    Fluid Accumulation:  Mild, Extremities   Strength:  Not performed         Nutrition Assessment:         Pt admitted with Severe sepsis ([facility] [address] [A41.9, R65.20]    Past Medical History:   Diagnosis Date    BPH (benign prostatic hyperplasia)     Chronic anemia 7/1/2020    He has 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 12/19/19 hGb is 12.4 with HcT 36.5 His chronic anemia could be contributory to his fatigue. H/H ok on Cassandra 10, 2020 labs.  Chronic GERD     Chronic kidney disease     Erectile dysfunction     Gout     Hypertension     Ill-defined condition     dialysis access to left arm    Inflammatory bowel disease     Meningitis     Renal transplant recipient     S/P TURP 2015    Stage 5 chronic kidney disease on chronic dialysis ([facility] [address] 7/1/2020    He has failed bilateral renal transplants, and is dialysis dependent at this time. He is followed by Dr. Afsaneh Krishnan, nephrology. His creatinine on 10/5/18 was 6.37 His creatinine on 12/19/19 was 6.65. Creatinine on 3/31/2020 was 6.41. Creatinineo on 6/10/2020 was 3.84    Testicular hypofunction 7/1/2020    He was last seen by a provider on 5/28/2020 via virtual visit. His testosterone level was found to be 79 on 3/31/2020. His levels have not improved on 200 mg IM q 2 weeks.  He still notes fatigue and ED, though somewhat improved. LFT ok (3/31/2020). 6/10/2020 testosterone (stas) is 1304. Repeat test on 6/25/2020 testosterone 1059 (stas) with free at 24.1. He has a history of fatigue, low libido,    Thyroid disease     Tobacco use disorder        Consult for wounds. Pt admitted from hospice w HD. Pt noted to d/c with hospice. Pt has lost 13 lbs (7.8%) in the last 4 months which is significant for time frame, along with more gradual continued weight loss from last year. Noted diet changed to soft and bite size 2/2 not have dentures. Added Nepro BID for ease of protein intake. Monitor weight for reference but no active nutritional intervention required as pt is moving towards hospice. Last BM yesterday. Monitor plan of care and PO intake. Last BM: 07/07/22, Loose    PO intake: No data found. Wt Readings from Last 30 Encounters:   07/08/22 64.1 kg (141 lb 6.4 oz)   03/24/22 69.7 kg (153 lb 10.6 oz)   05/12/21 80 kg (176 lb 5.9 oz)   10/14/20 81.6 kg (180 lb)   03/17/20 84.6 kg (186 lb 6.4 oz)   04/30/19 85 kg (187 lb 8 oz)   07/05/18 81.2 kg (179 lb)   06/21/18 82.1 kg (181 lb)   02/25/18 80.7 kg (178 lb)   03/01/15 86.2 kg (190 lb)   09/15/14 83.9 kg (185 lb)   05/29/11 97.5 kg (215 lb)           Nutrition Related Findings:      Wound Type: None    Current Nutrition Intake & Therapies:        ADULT DIET Dysphagia - Soft & Bite Sized    Anthropometric Measures:  Height: 5' 10\" (177.8 cm)  Ideal Body Weight (IBW): 166 lbs (75 kg)     Current Body Wt:  64.1 kg (141 lb 5 oz), 85.1 % IBW.  Bed scale  Current BMI (kg/m2): 20.3        Weight Adjustment: No adjustment                 BMI Category: Normal weight (BMI 22.0-24.9) age over 72    Estimated Daily Nutrient Needs:  Energy Requirements Based On: Kcal/kg  Weight Used for Energy Requirements: Current  Energy (kcal/day): 1600  Weight Used for Protein Requirements: Current  Protein (g/day): 90  Method Used for Fluid Requirements: Standard renal  Fluid (ml/day): 1500    Nutrition Diagnosis:   · Inadequate protein intake related to increased demand for energy/nutrients as evidenced by dialysis      Nutrition Interventions:   Food and/or Nutrient Delivery: Continue current diet,Start oral nutrition supplement  Nutrition Education/Counseling: No recommendations at this time  Coordination of Nutrition Care: Continue to monitor while inpatient,Interdisciplinary rounds       Goals:     Goals: PO intake 50% or greater,within 7 days       Nutrition Monitoring and Evaluation:   Behavioral-Environmental Outcomes: Beliefs and attitudes  Food/Nutrient Intake Outcomes: Food and nutrient intake,Supplement intake  Physical Signs/Symptoms Outcomes: Biochemical data,Weight,Skin    Discharge Planning:    No discharge needs at this time    Elaine Navarro, [address]: [phone]

## 2022-07-08 NOTE — PROGRESS NOTES
[facility] [place] [address]  [address], [address] [place]  [phone]      Medical Progress Note      NAME:         Patricia Pascual   :        1950  MRM:        [id number]    Date of service: 2022      Interval HPI: Patient admitted with severe sepsis. He is much better, awake and alert. Denies any chest pain or SOB. No fever or chills. Objective:    Vital Signs:    Visit Vitals  /62 (BP 1 Location: Left leg, BP Patient Position: At rest)   Pulse 67   Temp 97.7 °F (36.5 °C)   Resp 14   Ht 5' 10\" (1.778 m)   Wt 64.1 kg (141 lb 6.4 oz)   SpO2 100%   BMI 20.29 kg/m²          Intake/Output Summary (Last 24 hours) at 2022 1144  Last data filed at 2022 2708  Gross per 24 hour   Intake 1177.5 ml   Output 0 ml   Net 1177.5 ml        Physical Examination:    General:   Weak, cachectic and ill looking patient, not in distress   ENT:   no ottorrhea or rhinorrhea with dry mucous membranes  Neck: no masses, thyroid non-tender and trachea central.  Pulm: decreased breath sounds bilaterally. No wheezing. Card:  no JVD or murmurs, has regular and normal S1, S2 without thrills, bruits or peripheral edema  Abd:  Soft, non-tender, non-distended, normoactive bowel sounds   Musc:  No cyanosis, clubbing, atrophy or deformities. Skin:  No rashes, bruising or ulcers. Neuro: Awake and alert.  Non focal exam   Psych:  Has some insight to his illness    Current Facility-Administered Medications   Medication Dose Route Frequency    cefepime (MAXIPIME) 1 g in 0.9% sodium chloride (MBP/ADV) 50 mL MBP  1 g IntraVENous Q24H    zinc sulfate (ZINCATE) 50 mg zinc (220 mg) capsule 1 Capsule  1 Capsule Oral DAILY    ascorbic acid (vitamin C) (VITAMIN C) tablet 500 mg  500 mg Oral Q6H    doxycycline (VIBRAMYCIN) 100 mg in 0.9% sodium chloride (MBP/ADV) 100 mL MBP  100 mg IntraVENous Q12H    B complex-vitaminC-folic acid (NEPHROCAP) cap  1 Capsule Oral DAILY    midodrine (PROAMATINE) tablet 10 mg  10 mg Oral Q MON, WED & FRI    pantoprazole (PROTONIX) tablet 40 mg  40 mg Oral DAILY    sevelamer carbonate (RENVELA) tab 800 mg  800 mg Oral TID WITH MEALS    timolol (TIMOPTIC) 0.5 % ophthalmic solution 1 Drop  1 Drop Left Eye DAILY    sodium chloride (NS) flush 5-40 mL  5-40 mL IntraVENous Q8H    sodium chloride (NS) flush 5-40 mL  5-40 mL IntraVENous PRN    acetaminophen (TYLENOL) tablet 650 mg  650 mg Oral Q6H PRN    Or    acetaminophen (TYLENOL) suppository 650 mg  650 mg Rectal Q6H PRN    polyethylene glycol (MIRALAX) packet 17 g  17 g Oral DAILY PRN    ondansetron (ZOFRAN ODT) tablet 4 mg  4 mg Oral Q8H PRN    Or    ondansetron (ZOFRAN) injection 4 mg  4 mg IntraVENous Q6H PRN    heparin (porcine) injection 5,000 Units  5,000 Units SubCUTAneous Q8H    famotidine (PEPCID) tablet 20 mg  20 mg Oral Q24H    LORazepam (ATIVAN) tablet 1 mg  1 mg Oral BID    oxyCODONE IR (ROXICODONE) tablet 5 mg  5 mg Oral BID        Laboratory data and review:    Recent Labs     07/07/22 0152   WBC 3.3*   HGB 10.4*   HCT 30.3*   *     Recent Labs     07/07/22 0152   *   K 5.0   CL 92*   CO2 30   GLU 74   BUN 33*   CREA 6.05*   CA 8.9   MG 2.2   PHOS 2.4*   ALB 2.2*   ALT 9*     No components found for: [place]    Diagnostics: Imaging studies have been reviewed    Telemetry reviewed by me:   normal sinus rhythm    Assessment and Plan:    Acute metabolic encephalopathy (7/05/7193) POA: this is likely his primary issue having developed AMS, missed his dialysis, poor oral intake in the setting of CoV-19 viral infection. Had HD 7/7. Much better today. Will DC IV antibiotics today. Continue supportive care. Diet as tolerated. He is a DNR on hospice. Monitor clinical progress    Severe sepsis ([address] (7/7/2022) POA: presumed on admission and improving.  I suspect this was due to CoV-19 given no other apparent source of a bacterial infection at this time. CXR and CTA chest show no areas of consolidation. Blood cultures remain neg. DC IV Cefepime and Vancomycin and follow clinical progress. Diet as tolerated    ESRD (end stage renal disease) on dialysis [facility]) (5/11/2021) / Renal transplant recipient (6/21/2018) POA: he is on hospice but has HD sessions for comfort. Had missed his session partly leading to current symptoms. Continue HD as per nephrology. Usually on a TTS schedule but will get another session of HD today then MWF schedule going forward. Has chronic hypotension. CM to arrange for discharge HD sessions given he has CoV-19. Outpatient continued hospice care. Continue Renvela, Midodrine    Severe protein-calorie malnutrition ([address] (7/7/2022) POA: he is very cachectic. BMI of 20. Has poor appetite leading to clinical deterioration. Worsened by current viral infection. Continue diet as tolerated. On hospice care    COVID-19 virus infection (7/7/2022) POA: has mild symptoms. Not hypoxic. Supportive care. Zinc and Vitamin C    GERD (gastroesophageal reflux disease) (6/21/2018) POA: continue PPi, Famotidine. Stable    Benign prostatic hyperplasia without lower urinary tract symptoms (6/21/2018) POA: sp TURP (status post transurethral resection of prostate) (6/21/2018): Overview: No longer voiding and on dialysis. Monitor    Physical Debility / Chronic gout of multiple sites due to renal impairment (6/21/2018) / Chronic lymphedema RUE POA: continue pain control as needed    PAF (paroxysmal atrial fibrillation) ([facility] [address] (3/15/2020) POA: currently in NSR. Stable. Monitor    Anemia due to chronic kidney disease (7/7/2022) POA: Hgb stable.  Monitor    Glaucoma POA: continue eye gtts                Care Plan discussed with: Patient, Care Manager, Nursing Staff and Hospice team    Discussed:  Care Plan    Prophylaxis:  Hep SQ and H2B/PPI    Anticipated Disposition: Home with Hospice           ___________________________________________________    Attending Physician:   Zeb Li MD

## 2022-07-08 NOTE — WOUND CARE
Wound Consult:  New consult Visit. Chart reviewed. Consulted for skin assessment. Spoke with patients nurse,  Chayo Yost RN at bedside for assessment. Patient is resting on a hillrom bed with [employer]acare mattress. Heels off loaded with pillows. Hospice at home  Patient is awake, alert, coopeative; requires 2 assists to move side to side in bed. Tonio score 11. Assessment:  POA Sacrum/buttocks- red/pink, blanches no open areas  Bilateral heels- no redness noted  Hips, back, vertebra area- no redness  Right arm- edematous   Treatment:  Sacrum/buttocks- sacral foam applied  Pillows between knees, pillow under right arm  Wound Recommendations:  Sacrum- sacral foam, change every 3 days  Right arm- keep elevated on pillows  Mobility team  Elevate heels on pillows and pillow between knees when on side  Skin Care / PI Prevention Recommendations:  1. Minimize friction/shear: minimize layers of linen/pads under patient. 2. Off load pressure/reposition:  turn and reposition approximately every 2 hours; float heels with pillows or use off loading heel boots; waffle cushion for sitting; position wedge. 3. Manage Moisture - keep skin folds dry; incontinence skin care with incontinence wipes; zinc guard barrier ointment. 4. Continue to monitor nutrition, pain, and skin risk scale, and skin assessment. Plan:  Spoke with Dr. Shea Angela regarding findings and proposed orders for treatment. We will continue to reassess weekly and as needed. Please re-consult should concerns arise despite continued skin/PI prevention measures.     [address], Wound / [address] [facility] Healing Office [phone]

## 2022-07-09 VITALS
SYSTOLIC BLOOD PRESSURE: 115 MMHG | DIASTOLIC BLOOD PRESSURE: 72 MMHG | HEIGHT: 70 IN | WEIGHT: 141.4 LBS | OXYGEN SATURATION: 93 % | HEART RATE: 76 BPM | RESPIRATION RATE: 16 BRPM | TEMPERATURE: 98.2 F | BODY MASS INDEX: 20.24 KG/M2

## 2022-07-09 PROCEDURE — 74011000250 HC RX REV CODE- 250: Performed by: INTERNAL MEDICINE

## 2022-07-09 PROCEDURE — 74011250636 HC RX REV CODE- 250/636: Performed by: INTERNAL MEDICINE

## 2022-07-09 PROCEDURE — 74011000250 HC RX REV CODE- 250: Performed by: STUDENT IN AN ORGANIZED HEALTH CARE EDUCATION/TRAINING PROGRAM

## 2022-07-09 PROCEDURE — 74011250636 HC RX REV CODE- 250/636: Performed by: STUDENT IN AN ORGANIZED HEALTH CARE EDUCATION/TRAINING PROGRAM

## 2022-07-09 PROCEDURE — 93005 ELECTROCARDIOGRAM TRACING: CPT

## 2022-07-09 PROCEDURE — 74011250637 HC RX REV CODE- 250/637: Performed by: STUDENT IN AN ORGANIZED HEALTH CARE EDUCATION/TRAINING PROGRAM

## 2022-07-09 PROCEDURE — 94760 N-INVAS EAR/PLS OXIMETRY 1: CPT

## 2022-07-09 RX ORDER — ASCORBIC ACID 500 MG
500 TABLET ORAL 2 TIMES DAILY
Qty: 60 TABLET | Refills: 0 | Status: SHIPPED | OUTPATIENT
Start: 2022-07-09 | End: 2022-08-08

## 2022-07-09 RX ORDER — ZINC SULFATE 50(220)MG
1 CAPSULE ORAL DAILY
Qty: 30 CAPSULE | Refills: 0 | Status: SHIPPED | OUTPATIENT
Start: 2022-07-09 | End: 2022-08-08

## 2022-07-09 RX ORDER — MORPHINE SULFATE 2 MG/ML
2 INJECTION, SOLUTION INTRAMUSCULAR; INTRAVENOUS
Status: DISCONTINUED | OUTPATIENT
Start: 2022-07-09 | End: 2022-07-09 | Stop reason: HOSPADM

## 2022-07-09 RX ORDER — SODIUM CHLORIDE 9 MG/ML
500 INJECTION, SOLUTION INTRAVENOUS ONCE
Status: COMPLETED | OUTPATIENT
Start: 2022-07-09 | End: 2022-07-09

## 2022-07-09 RX ORDER — METOPROLOL TARTRATE 5 MG/5ML
5 INJECTION INTRAVENOUS ONCE
Status: COMPLETED | OUTPATIENT
Start: 2022-07-09 | End: 2022-07-09

## 2022-07-09 RX ADMIN — SODIUM CHLORIDE, PRESERVATIVE FREE 10 ML: 5 INJECTION INTRAVENOUS at 05:05

## 2022-07-09 RX ADMIN — LORAZEPAM 1 MG: 1 TABLET ORAL at 08:43

## 2022-07-09 RX ADMIN — METOPROLOL TARTRATE 5 MG: 5 INJECTION INTRAVENOUS at 01:00

## 2022-07-09 RX ADMIN — MORPHINE SULFATE 2 MG: 2 INJECTION, SOLUTION INTRAMUSCULAR; INTRAVENOUS at 01:00

## 2022-07-09 RX ADMIN — HEPARIN SODIUM 5000 UNITS: 5000 INJECTION INTRAVENOUS; SUBCUTANEOUS at 05:05

## 2022-07-09 RX ADMIN — SODIUM CHLORIDE, PRESERVATIVE FREE 10 ML: 5 INJECTION INTRAVENOUS at 14:06

## 2022-07-09 RX ADMIN — LORAZEPAM 1 MG: 1 TABLET ORAL at 17:44

## 2022-07-09 RX ADMIN — HEPARIN SODIUM 5000 UNITS: 5000 INJECTION INTRAVENOUS; SUBCUTANEOUS at 14:06

## 2022-07-09 RX ADMIN — TIMOLOL MALEATE 1 DROP: 5 SOLUTION OPHTHALMIC at 08:44

## 2022-07-09 RX ADMIN — OXYCODONE HYDROCHLORIDE AND ACETAMINOPHEN 500 MG: 500 TABLET ORAL at 05:06

## 2022-07-09 RX ADMIN — SODIUM CHLORIDE, PRESERVATIVE FREE 10 ML: 5 INJECTION INTRAVENOUS at 01:01

## 2022-07-09 RX ADMIN — SEVELAMER CARBONATE 800 MG: 800 TABLET, FILM COATED ORAL at 11:30

## 2022-07-09 RX ADMIN — OXYCODONE 5 MG: 5 TABLET ORAL at 17:44

## 2022-07-09 RX ADMIN — SEVELAMER CARBONATE 800 MG: 800 TABLET, FILM COATED ORAL at 17:44

## 2022-07-09 RX ADMIN — OXYCODONE 5 MG: 5 TABLET ORAL at 08:43

## 2022-07-09 RX ADMIN — MORPHINE SULFATE 2 MG: 2 INJECTION, SOLUTION INTRAMUSCULAR; INTRAVENOUS at 05:05

## 2022-07-09 RX ADMIN — SODIUM CHLORIDE 500 ML: 9 INJECTION, SOLUTION INTRAVENOUS at 09:45

## 2022-07-09 RX ADMIN — OXYCODONE HYDROCHLORIDE AND ACETAMINOPHEN 500 MG: 500 TABLET ORAL at 17:44

## 2022-07-09 RX ADMIN — SEVELAMER CARBONATE 800 MG: 800 TABLET, FILM COATED ORAL at 08:43

## 2022-07-09 RX ADMIN — NEPHROCAP 1 CAPSULE: 1 CAP ORAL at 08:43

## 2022-07-09 RX ADMIN — SODIUM CHLORIDE 500 ML: 9 INJECTION, SOLUTION INTRAVENOUS at 11:23

## 2022-07-09 RX ADMIN — PANTOPRAZOLE SODIUM 40 MG: 40 TABLET, DELAYED RELEASE ORAL at 08:43

## 2022-07-09 RX ADMIN — OXYCODONE HYDROCHLORIDE AND ACETAMINOPHEN 500 MG: 500 TABLET ORAL at 11:30

## 2022-07-09 RX ADMIN — ZINC SULFATE 220 MG (50 MG) CAPSULE 1 CAPSULE: CAPSULE at 08:43

## 2022-07-09 NOTE — PROGRESS NOTES
1330  Pt arrived from [facility] via bed. Resting comfortably, call-bell in reach, bed in low position. VSS. No needs at this time. [address]  Discharge instructions/AVS discussed in detail with pt's daughter. Pt verbalizes understanding of all instructions, including where to get new medications. Pt's daughter denies any questions about instructions and has signed paper copy AVS. Pt discharged per MD order, being driven home by daughter. Pt was transferred to car via wheelchair. Pt in no apparent distress at time of discharge with no complaints. IVs removed. 1930  TRANSFER - OUT REPORT:    Attempt to call report on Rajat Gongora  being transferred to [facility]. Called number [phone] for updates regarding pt status and routine progression of care.  took this unit's call-back number and this RN's name. Expecting a call-back from on-call nurse. Patient transported by wheelchair with daughter, IVs removed, pt belongings with pt, AVS signed by daughter - copy with daughter and placed in pt chart. 1950  No call-back received at this time.

## 2022-07-09 NOTE — PROCEDURES
Hemodialysis / [phone]    Vitals Pre Post Assessment Pre Post   BP BP: (!) 150/86 (07/08/22 1516) 166/90 LOC Awake, alert, oriented x 3. Awake, alert, oriented x 3. HR Pulse (Heart Rate): 78 (07/08/22 1516) 81 Lungs Diminished Diminished   Resp Resp Rate: 16 (07/08/22 1516) 24 Cardiac HR 78 HR 81   Temp Temp: 97.7 °F (36.5 °C) (07/08/22 1516) Not obtained Skin CDI CDI   Weight  Not obtained Not obtained Edema Extreme pitting edema to RUE, otherwise no edema noted. Extreme pitting edema to RUE, otherwise no edema noted. Tele status Remote Remote Pain Pain Intensity 1: 0 (07/08/22 1516) Patient exhibits facial expression that appears uncomfortable, repeatedly moaning, states that his neck hurts. RN notified of complaint. Orders   Duration: Start: 1948 End: 2025 Total: 0.5   Dialyzer: Dialyzer/Set Up Inspection: Altagracia Blas (07/08/22 1948)   K Bath: Dialysate K (mEq/L): 2 (07/08/22 1948)   Ca Bath: Dialysate CA (mEq/L): 2.5 (07/08/22 1948)   Na: Dialysate NA (mEq/L): 138 (07/08/22 1948)   Bicarb: Dialysate HCO3 (mEq/L): 35 (07/08/22 1948)   Target Fluid Removal: Goal/Amount of Fluid to Remove (mL): 0 mL (07/08/22 1948)     Access   Type & Location: RUG AVG   Comments:                     Faint, but + bruit and thrill. AVG cleaned per P&P. Unsuccessful initial attempt at cannulation w/ 15g needles. Verito Franklin RN present to attempt cannulation. AVG. AVG cleaned per P&P. AVG accessed w/ 15g needles, + flashback noted in venous and arterial limbs. Both limbs flushed with 10ccNS, no resistance noted.                     Labs   HBsAg (Antigen) / date:         Negative 7/8/22                                      HBsAb (Antibody) / date:         Immune 7/8/22   Source:    Obtained/Reviewed  Critical Results Called HGB   Date Value Ref Range Status   07/07/2022 10.4 (L) 12.1 - 17.0 g/dL Final     Potassium   Date Value Ref Range Status   07/07/2022 5.0 3.5 - 5.1 mmol/L Final     Calcium   Date Value Ref Range Status   07/07/2022 8.9 8.5 - 10.1 MG/DL Final     BUN   Date Value Ref Range Status   07/07/2022 33 (H) 6 - 20 MG/DL Final     Creatinine   Date Value Ref Range Status   07/07/2022 6.05 (H) 0.70 - 1.30 MG/DL Final        Meds Given   Name Dose Route                    Adequacy / Fluid    Total Liters Process: 9.6   Net Fluid Removed: 0      Comments   Time Out Done:   (Time) 1930   Admitting Diagnosis: ESRD   Consent obtained/signed: Informed Consent Verified: Yes (OP HD [facility]) (07/08/22 1948)   Sophia Gleason / Lesly Barrientos # Machine Number: [id number] (07/08/22 1948)   Primary Nurse Rpt Pre: Tracy Clark RN   Primary Nurse Rpt Post: ANTHONY Bateman RN   Pt Education: Infection prevention   Care Plan: Continue HD plan of care   Pts outpatient clinic: [facility]     Tx Summary   Comments:         1948 - HD initiated without incident. Blood pump running well at 400. Patient requests assistance to reposition his pillow and adjust HOB. Assistance provided per request.    2000 - Patient continues to request pillow and HOB repositioning. Assistance provided per reuqest.    2022 - Patient continues to c/o neck pain. Patient states \"You're going to have to take me off. \"  Patient states \"It's never felt like this before. \"  RN paged to review pain medication options and discuss with patient. All possible blood returned to patient. Needles removed, manual pressure applied w/ dry 2x2 for approximately 8 minutes. Hemostasis achieved. Puncture sites covered with dry 2x2, secured with tape. Report to ANTHONY Bateman RN.

## 2022-07-09 NOTE — PROGRESS NOTES
Problem: Pressure Injury - Risk of  Goal: *Prevention of pressure injury  Description: Document Tonio Scale and appropriate interventions in the flowsheet. Outcome: Progressing Towards Goal  Note: Pressure Injury Interventions:  Sensory Interventions: Assess changes in LOC,Check visual cues for pain,Keep linens dry and wrinkle-free    Moisture Interventions: Absorbent underpads    Activity Interventions: Assess need for specialty bed    Mobility Interventions: Turn and reposition approx. every two hours(pillow and wedges)    Nutrition Interventions: Document food/fluid/supplement intake    Friction and Shear Interventions: Apply protective barrier, creams and emollients                Problem: Patient Education: Go to Patient Education Activity  Goal: Patient/Family Education  Outcome: Progressing Towards Goal     Problem: Falls - Risk of  Goal: *Absence of Falls  Description: Document Boni Fall Risk and appropriate interventions in the flowsheet. Outcome: Progressing Towards Goal  Note: Fall Risk Interventions:       Mentation Interventions: Bed/chair exit alarm    Medication Interventions: Bed/chair exit alarm    Elimination Interventions: Bed/chair exit alarm,Call light in reach              Problem: Patient Education: Go to Patient Education Activity  Goal: Patient/Family Education  Outcome: Progressing Towards Goal     Problem: Airway Clearance - Ineffective  Goal: Achieve or maintain patent airway  Outcome: Progressing Towards Goal     Problem: Gas Exchange - Impaired  Goal: Absence of hypoxia  Outcome: Progressing Towards Goal  Goal: Promote optimal lung function  Outcome: Progressing Towards Goal     Problem: Breathing Pattern - Ineffective  Goal: Ability to achieve and maintain a regular respiratory rate  Outcome: Progressing Towards Goal     Problem:  Body Temperature -  Risk of, Imbalanced  Goal: Ability to maintain a body temperature within defined limits  Outcome: Progressing Towards Goal  Goal: Will regain or maintain usual level of consciousness  Outcome: Progressing Towards Goal  Goal: Complications related to the disease process, condition or treatment will be avoided or minimized  Outcome: Progressing Towards Goal     Problem: Isolation Precautions - Risk of Spread of Infection  Goal: Prevent transmission of infectious organism to others  Outcome: Progressing Towards Goal     Problem: Nutrition Deficits  Goal: Optimize nutrtional status  Outcome: Progressing Towards Goal     Problem: Risk for Fluid Volume Deficit  Goal: Maintain normal heart rhythm  Outcome: Progressing Towards Goal  Goal: Maintain absence of muscle cramping  Outcome: Progressing Towards Goal  Goal: Maintain normal serum potassium, sodium, calcium, phosphorus, and pH  Outcome: Progressing Towards Goal     Problem: Loneliness or Risk for Loneliness  Goal: Demonstrate positive use of time alone when socialization is not possible  Outcome: Progressing Towards Goal     Problem: Fatigue  Goal: Verbalize increase energy and improved vitality  Outcome: Progressing Towards Goal     Problem: Patient Education: Go to Patient Education Activity  Goal: Patient/Family Education  Outcome: Progressing Towards Goal

## 2022-07-09 NOTE — PROGRESS NOTES
2051  Notified by dialysis nurse that the patient was c/o severe neck pain. At 2022 patient requested to stop HD. He had ativan 1mg PO and roxicodone 5mg PO scheduled BID, but the 1800 scheduled doses were held D/T dialysis. Administered to patient at 2051.     2306  Patient continuously requests to extend BLE, but they are contracted and physically do not extend. 0037  Patient's HR is sustaining in the 130-140's. He has a history of paroxysmal A-fib. Notified Dr. Renato Holcomb. New orders received for EKG and IV metoprolol 5mg. Also discussed patient's pain, he only has PO ativan and roxicodone scheduled BID, and tylenol prn. Explained that the patient is on hospice at home, his right arm is extremely swollen, with greater than 4+ edema, and BLE are contracted. He remains restless and c/o pain. New orders received for morphine 2mg IV q4h prn.     0123  HR = 99      Bedside and Verbal shift change report given to Juanita (oncoming nurse) by Amos (offgoing nurse). Report included the following information SBAR, Kardex, ED Summary, Procedure Summary, Intake/Output, MAR, Recent Results and Med Rec Status.

## 2022-07-09 NOTE — PROGRESS NOTES
0700 Bedside shift change report given to [address] (oncoming nurse) by Kiara Rao (offgoing nurse). Report included the following information SBAR, Kardex, ED Summary, Intake/Output, MAR and Recent Results. This patient was assisted with Intentional Toileting every 2 hours during this shift as appropriate. Documentation of ambulation and output reflected on Flowsheet as appropriate. Purposeful hourly rounding was completed using AIDET and 5Ps. Outcomes of PHR documented as they occurred. Bed alarm in use as appropriate. Dual Suction and ambubag in place. 3081 B/P 82/67. MD wilson made aware via perfect serve.    0905 MD ordered 500ml fluid bolus. 8040 B/P now 95/67    TRANSFER - OUT REPORT:    Verbal report given to Daniella Monreal RN(name) on Delmis Davidson  being transferred to 5th floor(unit) for routine progression of care       Report consisted of patients Situation, Background, Assessment and   Recommendations(SBAR). Information from the following report(s) SBAR, Kardex, ED Summary, Intake/Output, MAR and Recent Results was reviewed with the receiving nurse.     Lines:   Peripheral IV 07/07/22 Right (Active)   Site Assessment Clean, dry, & intact 07/09/22 1133   Phlebitis Assessment 0 07/09/22 1133   Infiltration Assessment 0 07/09/22 1133   Dressing Status Clean, dry, & intact 07/09/22 1133   Dressing Type Tape;Transparent 07/09/22 1133   Hub Color/Line Status End cap changed 07/09/22 1133   Action Taken Open ports on tubing capped 07/09/22 1133   Alcohol Cap Used Yes 07/09/22 1133       Peripheral IV 07/07/22 Right (Active)   Site Assessment Clean, dry, & intact 07/09/22 1133   Phlebitis Assessment 0 07/09/22 1133   Infiltration Assessment 0 07/09/22 1133   Dressing Status Clean, dry, & intact 07/09/22 1133   Dressing Type Transparent 07/09/22 1133   Hub Color/Line Status End cap changed 07/09/22 1133   Action Taken Open ports on tubing capped 07/09/22 1133   Alcohol Cap Used Yes 07/09/22 830 [facility] for questions and clarification was provided.       Patient transported with:   Registered Nurse  Tech

## 2022-07-09 NOTE — PROGRESS NOTES
5:06 PM  CM noted dc order. Reviewed EMR, sent AVS to [facility] and left a VM with the liaison to call the CM phone with any questions. No further dc needs. [address] Management Interventions  PCP Verified by CM: Yes ( Madelyn Foss NP)  Mode of Transport at Discharge:  Other (see comment)  Support Systems: Spouse/Significant Other  Confirm Follow Up Transport: Other (see comment)  The Plan for Transition of Care is Related to the Following Treatment Goals : septic/ [employer]  Discharge Location  Patient Expects to be Discharged to[de-identified] Home with hospice

## 2022-07-09 NOTE — DISCHARGE INSTRUCTIONS
Hospital Medicine DISCHARGE INSTRUCTIONS    NAME: Griselda Ann   :  1950   MRN:  [id number]     Date:     2022    ADMIT DATE: 2022     DISCHARGE DATE: 2022     PRINCIPAL ADMITTING DIAGNOSIS:  Severe sepsis ([facility] [A41.9, R65.20]    DISCHARGE DIAGNOSES:  Principal Problem:    Acute metabolic encephalopathy ()    Renal transplant recipient (2018)    GERD (gastroesophageal reflux disease) (2018)    S/P TURP (status post transurethral resection of prostate) (2018)      Overview: No longer voiding; on dialysis. PSA 10/14/2020 was 0.3      Benign prostatic hyperplasia without lower urinary tract symptoms (2018)      Overview: He has a history of BPH. He is s/p TURP on 13. He had benign prostate       specimens. He has ESRD and no longer voids. PSA on 19 was <0.1. PSA on 10/14/2020 was 0.3      Chronic gout of multiple sites due to renal impairment (2018)    PAF (paroxysmal atrial fibrillation) ([facility] (3/15/2020)    ESRD (end stage renal disease) on dialysis ([facility] (2021)    Severe protein-calorie malnutrition ([facility] (2022)    COVID-19 virus infection (2022)    Anemia due to chronic kidney disease (2022)    MEDICATIONS:    · It is important that medications are taken exactly as they are prescribed on the discharge medication instructions and keep them your  in the bottles provided by the pharmacist.   · Keep a list of the medication names, dosages, and times to be taken at all times. · Do not take other medications without consulting your doctor.      Recommended diet:  Renal Diet    Recommended activity: Activity as tolerated    Post discharge care:    Notify follow up health care provider or return to the emergency department if you cannot get hold of your doctor if you feel worse or experience symptoms similar to those that brought you to hospital    Follow-up Information     Follow up With Specialties Details Why Contact Info    [facility] [address]  On 7/11/2022 Go on 7/11/22 - MWF at 8 am [address] [facility],[address] Teresa Reyes, [facility]  [phone] or [phone]          Information obtained by :  I understand that if any problems occur once I am at home I am to contact my physician and I understand and acknowledge receipt of the instructions indicated above.                                                                                                                                            Physician's or R.N.'s Signature                                                                  Date/Time                                                                                                                                              Patient or Representative Signature                                                          Date/Time

## 2022-07-09 NOTE — DISCHARGE SUMMARY
Lambert Delgadillo [facility]  [address] [place] [address], [address] [place] [address]  Tel: [phone]    Hospital Medicine Discharge Summary    Patient ID:    Iván Tubbs  Age:              73 y.o.    : 1950  MRN:             [id number]     PCP: Rafa Dozier NP     Date of Admission: 2022    Date of Discharge:  2022    Principal admission Diagnosis:   Severe sepsis ([facility] [A41.9, R65.20]    Discharge Diagnoses:  Principal Problem:    Acute metabolic encephalopathy ()    COVID-19 virus infection (2022)    ESRD (end stage renal disease) on dialysis ([facility] (2021)    Severe protein-calorie malnutrition ([facility] (2022)    Renal transplant recipient (2018)    GERD (gastroesophageal reflux disease) (2018)    S/P TURP (status post transurethral resection of prostate) (2018)      Overview: No longer voiding; on dialysis. PSA 10/14/2020 was 0.3      Benign prostatic hyperplasia without lower urinary tract symptoms (2018)      Overview: He has a history of BPH. He is s/p TURP on 13. He had benign prostate       specimens. He has ESRD and no longer voids. PSA on 19 was <0.1. PSA on 10/14/2020 was 0.3      Chronic gout of multiple sites due to renal impairment (2018)    PAF (paroxysmal atrial fibrillation) ([facility] (3/15/2020)    Anemia due to chronic kidney disease (2022)    Hospital Course:     Mr. Corrina Swain is a 73 y.o. admitted to [facility] and treated for the following:    Acute metabolic encephalopathy () POA: this is likely his primary issue having developed AMS, missed his dialysis, poor oral intake in the setting of CoV-19 viral infection. Had HD . he has clininically improved and now awake and alert. Continue supportive care. HD as scheduled. Diet as tolerated. He is a DNR on hospice. Outpatient follow up with hospice.       COVID-19 virus infection (2022) POA: has mild symptoms. Has not been hypoxic. Continue supportive care. Zinc and Vitamin C    ESRD (end stage renal disease) on dialysis [facility]) (5/11/2021) / Renal transplant recipient (6/21/2018) POA: he is on hospice but has HD sessions for comfort. Had missed his session partly leading to current symptoms. Continue HD as per nephrology. Usually on a TTS schedule but will get another session of HD today then MWF schedule going forward. Has chronic hypotension. CM has arranged for discharge HD sessions given he has CoV-19 to be started 7/11/22 on a MWF schedule. I have discussed with the hospice team who will continue further care. Continue Renvela, Midodrine     Severe protein-calorie malnutrition ([address] (7/7/2022) POA: he is very cachectic. BMI of 20. Has poor appetite leading to clinical deterioration. Worsened by current viral infection. Continue diet as tolerated. On hospice care     GERD (gastroesophageal reflux disease) (6/21/2018) POA: continue PPi, Famotidine. Stable     Benign prostatic hyperplasia without lower urinary tract symptoms (6/21/2018) POA: sp TURP (status post transurethral resection of prostate) (6/21/2018): Overview: No longer voiding and on dialysis. Monitor     Physical Debility / Chronic gout of multiple sites due to renal impairment (6/21/2018) / Chronic lymphedema RUE POA: continue pain control as needed     PAF (paroxysmal atrial fibrillation) ([address] (3/15/2020) POA: currently in NSR. Stable. Anemia due to chronic kidney disease (7/7/2022) POA: Hgb stable. Glaucoma POA: continue eye gtts    Severe sepsis ([address] (7/7/2022) POA: presumed on admission and improving but has been ruled OUT given no overt source. I suspect this was due to CoV-19 given no other apparent source of a bacterial infection at this time. CXR and CTA chest show no areas of consolidation. Blood cultures remain neg. Has been off antibiotics and remained stable.      Discharge Exam:    Visit Vitals  /81 (BP 1 Location: Left leg, BP Patient Position: At rest)   Pulse (!) 103   Temp 98.2 °F (36.8 °C)   Resp 16   Ht 5' 10\" (1.778 m)   Wt 64.1 kg (141 lb 6.4 oz)   SpO2 97%   BMI 20.29 kg/m²        Patient has been seen and examined. General: weak but not in distress   Pulm: clear breath sounds without wheezes  Card: no murmurs, normal S1, S2 without thrills, bruits   Abd:    soft, non-tender, normoactive bowel sounds  Skin: no rashes and skin turgor is good  Neuro: awake, alert and has a non focal     Activity: Activity as tolerated    Diet: Renal Diet as tolerated    Current Discharge Medication List        START taking these medications    Details   ascorbic acid, vitamin C, (VITAMIN C) 500 mg tablet Take 1 Tablet by mouth two (2) times a day for 30 days. Qty: 60 Tablet, Refills: 0      zinc sulfate (ZINCATE) 50 mg zinc (220 mg) capsule Take 1 Capsule by mouth daily for 30 days. Qty: 30 Capsule, Refills: 0           CONTINUE these medications which have NOT CHANGED    Details   LORazepam (Ativan) 1 mg tablet Take 1 mg by mouth two (2) times a day. oxyCODONE IR (ROXICODONE) 5 mg immediate release tablet Take 5 mg by mouth two (2) times a day. midodrine (PROAMATINE) 5 mg tablet Take 2 Tabs by mouth every Monday, Wednesday, Friday. Qty: 30 Tab, Refills: 0      sevelamer (RENAGEL) 800 mg tablet Take 800 mg by mouth three (3) times daily (with meals). b complex-vitamin c-folic acid (DIALYVITE) 100-1 mg tab tablet Take 1 Tab by mouth daily. timolol (TIMOPTIC) 0.5 % ophthalmic solution Administer 1 Drop to left eye daily. pantoprazole (PROTONIX) 40 mg tablet Take 40 mg by mouth daily. Follow-up Information       Follow up With Specialties Details Why Contact Info    [facility]  [address]  On 7/11/2022 Go on 7/11/22 - MWF at 8 am [address] [facility],[address]   [facility], [address] [place] [address]  [phone] or [phone]            Follow-up tests or labs: None    Discharge Condition: Stable    Disposition: home    Time taken to co-ordinate and arrange discharge:  35 minutes.     Signed:  Chidi Aleln MD       7/9/2022   6:49 AM

## 2022-07-10 LAB
ATRIAL RATE: 138 BPM
CALCULATED R AXIS, ECG10: -76 DEGREES
CALCULATED R AXIS, ECG10: -81 DEGREES
CALCULATED T AXIS, ECG11: 92 DEGREES
CALCULATED T AXIS, ECG11: 97 DEGREES
DIAGNOSIS, 93000: NORMAL
DIAGNOSIS, 93000: NORMAL
Q-T INTERVAL, ECG07: 268 MS
Q-T INTERVAL, ECG07: 406 MS
QRS DURATION, ECG06: 106 MS
QRS DURATION, ECG06: 114 MS
QTC CALCULATION (BEZET), ECG08: 404 MS
QTC CALCULATION (BEZET), ECG08: 561 MS
VENTRICULAR RATE, ECG03: 115 BPM
VENTRICULAR RATE, ECG03: 137 BPM

## 2022-07-11 NOTE — PROGRESS NOTES
Physician Progress Note      PATIENT:               Deng Watson  CSN #:                  [id number]  :                       1950  ADMIT DATE:       2022 1:12 AM  DISCH DATE:        2022 6:37 PM  RESPONDING  PROVIDER #:        Virginia Lackey MD          QUERY TEXT:    Good Morning    This patient admitted for Sepsis due to COVID 19 infection. The patient presented with AMS, weakness, decreased appetite and FTT. COVID 19 +  Not currently requiring o2. If possible, could you further clarify the COVID 19 infection and Please document in progress notes and discharge summary if you are evaluating or treating any of the following: The medical record reflects the following:  Risk Factors: ESRD on HD, currently on home hospice care. Clinical Indicators: Presented  confusion, decreased appetite , weakness with fever of 102.7, WBC 3.3, , Rapid COVID 19 +, CXR no acute process on limited portable chest view. CT chest/abd \"new mild right and trace left pleural effusions\"  Treatment: Blood cx, CT chest, Rapid COVID test, Therapeutic vit, Procal, \"empiric abx\", Isolation, Is not currently requiring o2. Thank you  Kathy Verdin. BSN,  RN, CPHQ, CCDS, SMART  Options provided:  -- Acute bronchitis due to COVID-19  -- Acute lower respiratory infection due to COVID-19  -- Pneumonia due to COVID-19  -- Other - I will add my own diagnosis  -- Disagree - Not applicable / Not valid  -- Disagree - Clinically unable to determine / Unknown  -- Refer to Clinical Documentation Reviewer    PROVIDER RESPONSE TEXT:    This patient has acute bronchitis due to COVID-19.     Query created by: Prosper Swanson on 2022 12:25 PM      Electronically signed by:  Virginia Lackey MD 2022 12:01 PM

## 2022-07-12 LAB
BACTERIA SPEC CULT: NORMAL
IL6 SERPL-MCNC: 435.1 PG/ML (ref 0–13)
SERVICE CMNT-IMP: NORMAL

## 2025-06-17 NOTE — PROGRESS NOTES
[facility]: MED RECONCILIATION    Comments/Recommendations:   Patient brought in a bag of home medications for review. Patient states he last took his medications yesterday morning. Medication(s) ADDED to PTA list:  Midodrine 5 mg tab: 3 tabs (15 mg) po TID  Prednisone 10 mg daily  Eliquis 5 mg  BID    Medication(s) REMOVED from PTA list:  ASA 81 mg daily  Clopidogrel 75 mg daily    Medication(s) ADJUSTED on PTA list:  Megestrol frequency changed to \"BID\" (from daily entry)    Information obtained from: Rx bottles/ Patient      Allergies: Patient has no known allergies. Prior to Admission Medications:     Prior to Admission Medications   Prescriptions Last Dose Informant Patient Reported? Taking?   allopurinol (ZYLOPRIM) 100 mg tablet 3/13/2020 at am Self Yes Yes   Sig: Take 100 mg by mouth daily. apixaban (ELIQUIS) 5 mg tablet 3/13/2020 at am  Yes Yes   Sig: Take 5 mg by mouth two (2) times a day. b complex-vitamin c-folic acid (DIALYVITE) 100-1 mg tab tablet   Yes Yes   Sig: Take 1 Tab by mouth daily. fluticasone (FLONASE) 50 mcg/actuation nasal spray  Self Yes Yes   Si Sprays by Both Nostrils route daily as needed for Rhinitis or Allergies. megestrol (MEGACE) 40 mg tablet 3/13/2020 at am Self Yes Yes   Sig: Take 40 mg by mouth two (2) times a day. midodrine (PROAMITINE) 5 mg tablet 3/13/2020 at Unknown time  Yes Yes   Sig: Take 15 mg by mouth three (3) times daily. pantoprazole (PROTONIX) 40 mg tablet 3/13/2020 at am Self Yes Yes   Sig: Take 40 mg by mouth daily. predniSONE (DELTASONE) 10 mg tablet 3/13/2020 at Unknown time  Yes Yes   Sig: Take 10 mg by mouth daily. sildenafil citrate (VIAGRA) 100 mg tablet   No Yes   Sig: Take 1 Tab by mouth as needed. testosterone cypionate (DEPOTESTOTERONE CYPIONATE) 200 mg/mL injection  Self Yes Yes   Si mg by IntraMUSCular route every fourteen (14) days.    timolol (TIMOPTIC) 0.5 % ophthalmic solution 3/13/2020 at Unknown time Self Yes Yes   Sig: Administer 1 Drop to left eye daily.       Facility-Administered Medications: None              Eloina Ferrell, PHARMD   Contact: [phone] Betadine

## (undated) DEVICE — SUTURE VCRL SZ 4-0 L27IN ABSRB UD L24MM PS-1 3/8 CIR PRIM J935H

## (undated) DEVICE — PINNACLE R/O II INTRODUCER SHEATH WITH RADIOPAQUE MARKER: Brand: PINNACLE

## (undated) DEVICE — DRESSING HEMOSTATIC SFT INTVENT W/O SLT DBL WRP QUIKCLOT LF

## (undated) DEVICE — DERMABOND SKIN ADH 0.7ML -- DERMABOND ADVANCED 12/BX

## (undated) DEVICE — Device

## (undated) DEVICE — FIXED CORE WIRE GUIDE SAFE-T-J, CURVED: Brand: COOK

## (undated) DEVICE — DRESSING FOAM DISK DIA1IN H 7MM HYDRPHLC CHG IMPREG IN SL

## (undated) DEVICE — SUTURE PROL SZ 2-0 L18IN NONABSORBABLE BLU FS L26MM 3/8 CIR 8685H

## (undated) DEVICE — NEEDLE ANGIO 18GA L9CM NRML 1 WALL SMOOTH FINISH CLR HUB FOR

## (undated) DEVICE — FOGARTY THRU-LUMEN EMBOLECTOMY CATHETER, 4F 40CM: Brand: FOGARTY

## (undated) DEVICE — INTRODUCER SHTH 5 FRX30 CM 7 CM W/ NIT WIRE REG MICRO-STICK

## (undated) DEVICE — RADIFOCUS GLIDEWIRE: Brand: GLIDEWIRE